# Patient Record
Sex: MALE | Race: WHITE | NOT HISPANIC OR LATINO | Employment: OTHER | URBAN - METROPOLITAN AREA
[De-identification: names, ages, dates, MRNs, and addresses within clinical notes are randomized per-mention and may not be internally consistent; named-entity substitution may affect disease eponyms.]

---

## 2017-01-17 ENCOUNTER — COMMUNICATION - HEALTHEAST (OUTPATIENT)
Dept: INTERNAL MEDICINE | Facility: CLINIC | Age: 67
End: 2017-01-17

## 2017-01-25 ENCOUNTER — OFFICE VISIT - HEALTHEAST (OUTPATIENT)
Dept: EDUCATION SERVICES | Facility: CLINIC | Age: 67
End: 2017-01-25

## 2017-01-25 ENCOUNTER — AMBULATORY - HEALTHEAST (OUTPATIENT)
Dept: EDUCATION SERVICES | Facility: CLINIC | Age: 67
End: 2017-01-25

## 2017-01-25 DIAGNOSIS — E11.9 DIABETES MELLITUS, TYPE 2 (H): ICD-10-CM

## 2017-01-25 DIAGNOSIS — E11.9 DIABETES (H): ICD-10-CM

## 2017-01-30 ENCOUNTER — COMMUNICATION - HEALTHEAST (OUTPATIENT)
Dept: NURSING | Facility: CLINIC | Age: 67
End: 2017-01-30

## 2017-01-31 ENCOUNTER — COMMUNICATION - HEALTHEAST (OUTPATIENT)
Dept: NURSING | Facility: CLINIC | Age: 67
End: 2017-01-31

## 2017-01-31 ENCOUNTER — AMBULATORY - HEALTHEAST (OUTPATIENT)
Dept: CARE COORDINATION | Facility: CLINIC | Age: 67
End: 2017-01-31

## 2017-02-03 ENCOUNTER — COMMUNICATION - HEALTHEAST (OUTPATIENT)
Dept: NURSING | Facility: CLINIC | Age: 67
End: 2017-02-03

## 2017-02-03 ENCOUNTER — OFFICE VISIT - HEALTHEAST (OUTPATIENT)
Dept: INTERNAL MEDICINE | Facility: CLINIC | Age: 67
End: 2017-02-03

## 2017-02-03 ENCOUNTER — AMBULATORY - HEALTHEAST (OUTPATIENT)
Dept: LAB | Facility: CLINIC | Age: 67
End: 2017-02-03

## 2017-02-03 ENCOUNTER — COMMUNICATION - HEALTHEAST (OUTPATIENT)
Dept: INTERNAL MEDICINE | Facility: CLINIC | Age: 67
End: 2017-02-03

## 2017-02-03 DIAGNOSIS — E11.9 DIABETES MELLITUS, TYPE 2 (H): ICD-10-CM

## 2017-02-03 DIAGNOSIS — E66.9 OBESITY: ICD-10-CM

## 2017-02-03 DIAGNOSIS — E78.5 HYPERLIPIDEMIA, UNSPECIFIED HYPERLIPIDEMIA TYPE: ICD-10-CM

## 2017-02-03 DIAGNOSIS — I10 ESSENTIAL HYPERTENSION WITH GOAL BLOOD PRESSURE LESS THAN 140/90: ICD-10-CM

## 2017-02-03 LAB
CHOLEST SERPL-MCNC: 122 MG/DL
FASTING STATUS PATIENT QL REPORTED: NO
HBA1C MFR BLD: 5.6 % (ref 3.5–6)
HDLC SERPL-MCNC: 41 MG/DL
LDLC SERPL CALC-MCNC: 56 MG/DL
TRIGL SERPL-MCNC: 125 MG/DL

## 2017-02-03 ASSESSMENT — MIFFLIN-ST. JEOR: SCORE: 1594.28

## 2017-02-07 ENCOUNTER — COMMUNICATION - HEALTHEAST (OUTPATIENT)
Dept: INTERNAL MEDICINE | Facility: CLINIC | Age: 67
End: 2017-02-07

## 2017-03-14 ENCOUNTER — COMMUNICATION - HEALTHEAST (OUTPATIENT)
Dept: NURSING | Facility: CLINIC | Age: 67
End: 2017-03-14

## 2017-03-30 ENCOUNTER — COMMUNICATION - HEALTHEAST (OUTPATIENT)
Dept: INTERNAL MEDICINE | Facility: CLINIC | Age: 67
End: 2017-03-30

## 2017-04-20 ENCOUNTER — COMMUNICATION - HEALTHEAST (OUTPATIENT)
Dept: CARDIOLOGY | Facility: CLINIC | Age: 67
End: 2017-04-20

## 2017-04-20 DIAGNOSIS — I10 ESSENTIAL HYPERTENSION: ICD-10-CM

## 2017-06-07 ENCOUNTER — OFFICE VISIT - HEALTHEAST (OUTPATIENT)
Dept: EDUCATION SERVICES | Facility: CLINIC | Age: 67
End: 2017-06-07

## 2017-06-07 DIAGNOSIS — E11.9 DIABETES MELLITUS, TYPE 2 (H): ICD-10-CM

## 2017-06-07 DIAGNOSIS — E11.9 DIABETES (H): ICD-10-CM

## 2017-06-08 ENCOUNTER — COMMUNICATION - HEALTHEAST (OUTPATIENT)
Dept: NURSING | Facility: CLINIC | Age: 67
End: 2017-06-08

## 2017-06-13 ENCOUNTER — RECORDS - HEALTHEAST (OUTPATIENT)
Dept: ADMINISTRATIVE | Facility: OTHER | Age: 67
End: 2017-06-13

## 2017-06-19 ENCOUNTER — COMMUNICATION - HEALTHEAST (OUTPATIENT)
Dept: NURSING | Facility: CLINIC | Age: 67
End: 2017-06-19

## 2017-06-22 ENCOUNTER — COMMUNICATION - HEALTHEAST (OUTPATIENT)
Dept: INTERNAL MEDICINE | Facility: CLINIC | Age: 67
End: 2017-06-22

## 2017-07-10 ENCOUNTER — COMMUNICATION - HEALTHEAST (OUTPATIENT)
Dept: NURSING | Facility: CLINIC | Age: 67
End: 2017-07-10

## 2017-07-14 ENCOUNTER — AMBULATORY - HEALTHEAST (OUTPATIENT)
Dept: CARE COORDINATION | Facility: CLINIC | Age: 67
End: 2017-07-14

## 2017-08-15 ENCOUNTER — OFFICE VISIT - HEALTHEAST (OUTPATIENT)
Dept: INTERNAL MEDICINE | Facility: CLINIC | Age: 67
End: 2017-08-15

## 2017-08-15 DIAGNOSIS — Z12.11 SCREEN FOR COLON CANCER: ICD-10-CM

## 2017-08-15 DIAGNOSIS — R41.89 COGNITIVE IMPAIRMENT: ICD-10-CM

## 2017-08-15 DIAGNOSIS — I10 ESSENTIAL HYPERTENSION WITH GOAL BLOOD PRESSURE LESS THAN 140/90: ICD-10-CM

## 2017-08-15 DIAGNOSIS — E66.9 OBESITY: ICD-10-CM

## 2017-08-15 DIAGNOSIS — Z23 IMMUNIZATION DUE: ICD-10-CM

## 2017-08-15 DIAGNOSIS — E78.5 HYPERLIPIDEMIA, UNSPECIFIED HYPERLIPIDEMIA TYPE: ICD-10-CM

## 2017-08-15 DIAGNOSIS — R26.89 POOR BALANCE: ICD-10-CM

## 2017-08-15 DIAGNOSIS — E11.9 DIABETES MELLITUS, TYPE 2 (H): ICD-10-CM

## 2017-08-15 DIAGNOSIS — N18.30 CHRONIC KIDNEY DISEASE, STAGE III (MODERATE) (H): ICD-10-CM

## 2017-08-15 LAB
CHOLEST SERPL-MCNC: 145 MG/DL
FASTING STATUS PATIENT QL REPORTED: NO
HBA1C MFR BLD: 5.4 % (ref 3.5–6)
HDLC SERPL-MCNC: 45 MG/DL
LDLC SERPL CALC-MCNC: 68 MG/DL
TRIGL SERPL-MCNC: 162 MG/DL

## 2017-08-15 ASSESSMENT — MIFFLIN-ST. JEOR: SCORE: 1571.6

## 2017-08-17 ENCOUNTER — COMMUNICATION - HEALTHEAST (OUTPATIENT)
Dept: INTERNAL MEDICINE | Facility: CLINIC | Age: 67
End: 2017-08-17

## 2017-08-23 ENCOUNTER — COMMUNICATION - HEALTHEAST (OUTPATIENT)
Dept: NURSING | Facility: CLINIC | Age: 67
End: 2017-08-23

## 2017-08-31 ENCOUNTER — COMMUNICATION - HEALTHEAST (OUTPATIENT)
Dept: NURSING | Facility: CLINIC | Age: 67
End: 2017-08-31

## 2017-09-08 ENCOUNTER — COMMUNICATION - HEALTHEAST (OUTPATIENT)
Dept: NURSING | Facility: CLINIC | Age: 67
End: 2017-09-08

## 2017-09-13 ENCOUNTER — COMMUNICATION - HEALTHEAST (OUTPATIENT)
Dept: INTERNAL MEDICINE | Facility: CLINIC | Age: 67
End: 2017-09-13

## 2017-10-09 ENCOUNTER — COMMUNICATION - HEALTHEAST (OUTPATIENT)
Dept: NURSING | Facility: CLINIC | Age: 67
End: 2017-10-09

## 2017-10-09 ENCOUNTER — COMMUNICATION - HEALTHEAST (OUTPATIENT)
Dept: INTERNAL MEDICINE | Facility: CLINIC | Age: 67
End: 2017-10-09

## 2017-10-09 DIAGNOSIS — I10 HYPERTENSION: ICD-10-CM

## 2017-11-08 ENCOUNTER — OFFICE VISIT - HEALTHEAST (OUTPATIENT)
Dept: EDUCATION SERVICES | Facility: CLINIC | Age: 67
End: 2017-11-08

## 2017-11-08 DIAGNOSIS — E11.9 DIABETES MELLITUS, TYPE 2 (H): ICD-10-CM

## 2017-11-09 ENCOUNTER — COMMUNICATION - HEALTHEAST (OUTPATIENT)
Dept: NURSING | Facility: CLINIC | Age: 67
End: 2017-11-09

## 2017-11-28 ENCOUNTER — RECORDS - HEALTHEAST (OUTPATIENT)
Dept: ADMINISTRATIVE | Facility: OTHER | Age: 67
End: 2017-11-28

## 2017-12-14 ENCOUNTER — COMMUNICATION - HEALTHEAST (OUTPATIENT)
Dept: INTERNAL MEDICINE | Facility: CLINIC | Age: 67
End: 2017-12-14

## 2017-12-14 DIAGNOSIS — E11.9 DIABETES MELLITUS, TYPE 2 (H): ICD-10-CM

## 2017-12-14 DIAGNOSIS — I10 ESSENTIAL HYPERTENSION WITH GOAL BLOOD PRESSURE LESS THAN 140/90: ICD-10-CM

## 2017-12-14 DIAGNOSIS — E66.9 OBESITY: ICD-10-CM

## 2017-12-14 DIAGNOSIS — E78.5 HYPERLIPIDEMIA, UNSPECIFIED HYPERLIPIDEMIA TYPE: ICD-10-CM

## 2017-12-22 ENCOUNTER — COMMUNICATION - HEALTHEAST (OUTPATIENT)
Dept: INTERNAL MEDICINE | Facility: CLINIC | Age: 67
End: 2017-12-22

## 2017-12-22 ENCOUNTER — COMMUNICATION - HEALTHEAST (OUTPATIENT)
Dept: NURSING | Facility: CLINIC | Age: 67
End: 2017-12-22

## 2017-12-22 DIAGNOSIS — E78.5 HYPERLIPIDEMIA, UNSPECIFIED HYPERLIPIDEMIA TYPE: ICD-10-CM

## 2017-12-22 DIAGNOSIS — E66.9 OBESITY: ICD-10-CM

## 2017-12-22 DIAGNOSIS — I10 ESSENTIAL HYPERTENSION WITH GOAL BLOOD PRESSURE LESS THAN 140/90: ICD-10-CM

## 2017-12-22 DIAGNOSIS — E11.9 DIABETES MELLITUS, TYPE 2 (H): ICD-10-CM

## 2018-02-22 ENCOUNTER — COMMUNICATION - HEALTHEAST (OUTPATIENT)
Dept: FAMILY MEDICINE | Facility: CLINIC | Age: 68
End: 2018-02-22

## 2018-03-17 ENCOUNTER — COMMUNICATION - HEALTHEAST (OUTPATIENT)
Dept: INTERNAL MEDICINE | Facility: CLINIC | Age: 68
End: 2018-03-17

## 2018-03-17 DIAGNOSIS — E11.9 DIABETES MELLITUS, TYPE 2 (H): ICD-10-CM

## 2018-04-05 ENCOUNTER — COMMUNICATION - HEALTHEAST (OUTPATIENT)
Dept: NURSING | Facility: CLINIC | Age: 68
End: 2018-04-05

## 2018-04-25 ENCOUNTER — COMMUNICATION - HEALTHEAST (OUTPATIENT)
Dept: INTERNAL MEDICINE | Facility: CLINIC | Age: 68
End: 2018-04-25

## 2018-04-25 ENCOUNTER — OFFICE VISIT - HEALTHEAST (OUTPATIENT)
Dept: INTERNAL MEDICINE | Facility: CLINIC | Age: 68
End: 2018-04-25

## 2018-04-25 DIAGNOSIS — I10 ESSENTIAL HYPERTENSION: ICD-10-CM

## 2018-04-25 DIAGNOSIS — E11.9 DIABETES MELLITUS, TYPE 2 (H): ICD-10-CM

## 2018-04-25 DIAGNOSIS — E66.9 OBESITY: ICD-10-CM

## 2018-04-25 DIAGNOSIS — R41.89 COGNITIVE IMPAIRMENT: ICD-10-CM

## 2018-04-25 DIAGNOSIS — E78.5 HYPERLIPIDEMIA, UNSPECIFIED HYPERLIPIDEMIA TYPE: ICD-10-CM

## 2018-04-25 LAB
ANION GAP SERPL CALCULATED.3IONS-SCNC: 12 MMOL/L (ref 5–18)
BUN SERPL-MCNC: 21 MG/DL (ref 8–22)
CALCIUM SERPL-MCNC: 9.7 MG/DL (ref 8.5–10.5)
CHLORIDE BLD-SCNC: 102 MMOL/L (ref 98–107)
CHOLEST SERPL-MCNC: 119 MG/DL
CO2 SERPL-SCNC: 25 MMOL/L (ref 22–31)
CREAT SERPL-MCNC: 1.04 MG/DL (ref 0.7–1.3)
FASTING STATUS PATIENT QL REPORTED: YES
GFR SERPL CREATININE-BSD FRML MDRD: >60 ML/MIN/1.73M2
GLUCOSE BLD-MCNC: 100 MG/DL (ref 70–125)
HBA1C MFR BLD: 5.7 % (ref 3.5–6)
HDLC SERPL-MCNC: 40 MG/DL
LDLC SERPL CALC-MCNC: 59 MG/DL
POTASSIUM BLD-SCNC: 3.7 MMOL/L (ref 3.5–5)
SODIUM SERPL-SCNC: 139 MMOL/L (ref 136–145)
TRIGL SERPL-MCNC: 98 MG/DL

## 2018-04-25 ASSESSMENT — MIFFLIN-ST. JEOR: SCORE: 1585.2

## 2018-05-01 ENCOUNTER — COMMUNICATION - HEALTHEAST (OUTPATIENT)
Dept: NURSING | Facility: CLINIC | Age: 68
End: 2018-05-01

## 2018-05-10 ENCOUNTER — COMMUNICATION - HEALTHEAST (OUTPATIENT)
Dept: NURSING | Facility: CLINIC | Age: 68
End: 2018-05-10

## 2018-05-14 ENCOUNTER — AMBULATORY - HEALTHEAST (OUTPATIENT)
Dept: CARE COORDINATION | Facility: CLINIC | Age: 68
End: 2018-05-14

## 2018-05-22 ENCOUNTER — RECORDS - HEALTHEAST (OUTPATIENT)
Dept: ADMINISTRATIVE | Facility: OTHER | Age: 68
End: 2018-05-22

## 2018-06-22 ENCOUNTER — COMMUNICATION - HEALTHEAST (OUTPATIENT)
Dept: NURSING | Facility: CLINIC | Age: 68
End: 2018-06-22

## 2018-09-06 ENCOUNTER — COMMUNICATION - HEALTHEAST (OUTPATIENT)
Dept: INTERNAL MEDICINE | Facility: CLINIC | Age: 68
End: 2018-09-06

## 2018-09-06 DIAGNOSIS — I10 HYPERTENSION: ICD-10-CM

## 2018-12-05 ENCOUNTER — COMMUNICATION - HEALTHEAST (OUTPATIENT)
Dept: NURSING | Facility: CLINIC | Age: 68
End: 2018-12-05

## 2018-12-05 DIAGNOSIS — E66.9 OBESITY: ICD-10-CM

## 2018-12-05 DIAGNOSIS — E78.5 HYPERLIPIDEMIA, UNSPECIFIED HYPERLIPIDEMIA TYPE: ICD-10-CM

## 2018-12-05 DIAGNOSIS — I10 ESSENTIAL HYPERTENSION WITH GOAL BLOOD PRESSURE LESS THAN 140/90: ICD-10-CM

## 2018-12-05 DIAGNOSIS — I10 HYPERTENSION: ICD-10-CM

## 2018-12-05 DIAGNOSIS — E11.9 DIABETES MELLITUS, TYPE 2 (H): ICD-10-CM

## 2019-01-23 ENCOUNTER — COMMUNICATION - HEALTHEAST (OUTPATIENT)
Dept: NURSING | Facility: CLINIC | Age: 69
End: 2019-01-23

## 2019-01-23 DIAGNOSIS — E66.9 OBESITY: ICD-10-CM

## 2019-01-23 DIAGNOSIS — E11.9 DIABETES MELLITUS, TYPE 2 (H): ICD-10-CM

## 2019-01-23 DIAGNOSIS — E78.5 HYPERLIPIDEMIA, UNSPECIFIED HYPERLIPIDEMIA TYPE: ICD-10-CM

## 2019-01-23 DIAGNOSIS — I10 ESSENTIAL HYPERTENSION WITH GOAL BLOOD PRESSURE LESS THAN 140/90: ICD-10-CM

## 2019-02-26 ENCOUNTER — COMMUNICATION - HEALTHEAST (OUTPATIENT)
Dept: NURSING | Facility: CLINIC | Age: 69
End: 2019-02-26

## 2019-02-26 DIAGNOSIS — I10 HYPERTENSION: ICD-10-CM

## 2019-02-26 DIAGNOSIS — I10 ESSENTIAL HYPERTENSION WITH GOAL BLOOD PRESSURE LESS THAN 140/90: ICD-10-CM

## 2019-02-26 DIAGNOSIS — E78.5 HYPERLIPIDEMIA, UNSPECIFIED HYPERLIPIDEMIA TYPE: ICD-10-CM

## 2019-02-26 DIAGNOSIS — E11.9 DIABETES MELLITUS, TYPE 2 (H): ICD-10-CM

## 2019-02-26 DIAGNOSIS — E66.9 OBESITY: ICD-10-CM

## 2019-03-27 ENCOUNTER — COMMUNICATION - HEALTHEAST (OUTPATIENT)
Dept: INTERNAL MEDICINE | Facility: CLINIC | Age: 69
End: 2019-03-27

## 2019-03-27 DIAGNOSIS — E11.9 DIABETES MELLITUS, TYPE 2 (H): ICD-10-CM

## 2019-03-27 DIAGNOSIS — E11.9 DIABETES (H): ICD-10-CM

## 2019-03-27 RX ORDER — GLUCOSAMINE HCL/CHONDROITIN SU 500-400 MG
CAPSULE ORAL
Qty: 30 STRIP | Refills: 11 | Status: SHIPPED | OUTPATIENT
Start: 2019-03-27 | End: 2023-08-11

## 2019-04-24 ENCOUNTER — COMMUNICATION - HEALTHEAST (OUTPATIENT)
Dept: NURSING | Facility: CLINIC | Age: 69
End: 2019-04-24

## 2019-04-24 DIAGNOSIS — I10 HYPERTENSION: ICD-10-CM

## 2019-04-24 DIAGNOSIS — E11.9 DIABETES MELLITUS, TYPE 2 (H): ICD-10-CM

## 2019-04-24 DIAGNOSIS — E78.5 HYPERLIPIDEMIA, UNSPECIFIED HYPERLIPIDEMIA TYPE: ICD-10-CM

## 2019-04-24 DIAGNOSIS — I10 ESSENTIAL HYPERTENSION WITH GOAL BLOOD PRESSURE LESS THAN 140/90: ICD-10-CM

## 2019-04-24 DIAGNOSIS — E66.9 OBESITY: ICD-10-CM

## 2019-05-01 ENCOUNTER — RECORDS - HEALTHEAST (OUTPATIENT)
Dept: ADMINISTRATIVE | Facility: OTHER | Age: 69
End: 2019-05-01

## 2019-05-28 ENCOUNTER — RECORDS - HEALTHEAST (OUTPATIENT)
Dept: ADMINISTRATIVE | Facility: OTHER | Age: 69
End: 2019-05-28

## 2019-06-04 ENCOUNTER — RECORDS - HEALTHEAST (OUTPATIENT)
Dept: HEALTH INFORMATION MANAGEMENT | Facility: CLINIC | Age: 69
End: 2019-06-04

## 2019-06-11 ENCOUNTER — RECORDS - HEALTHEAST (OUTPATIENT)
Dept: ADMINISTRATIVE | Facility: OTHER | Age: 69
End: 2019-06-11

## 2019-10-02 ENCOUNTER — COMMUNICATION - HEALTHEAST (OUTPATIENT)
Dept: INTERNAL MEDICINE | Facility: CLINIC | Age: 69
End: 2019-10-02

## 2019-10-02 ENCOUNTER — OFFICE VISIT - HEALTHEAST (OUTPATIENT)
Dept: INTERNAL MEDICINE | Facility: CLINIC | Age: 69
End: 2019-10-02

## 2019-10-02 DIAGNOSIS — Z12.5 SCREENING FOR PROSTATE CANCER: ICD-10-CM

## 2019-10-02 DIAGNOSIS — E78.5 HYPERLIPIDEMIA, UNSPECIFIED HYPERLIPIDEMIA TYPE: ICD-10-CM

## 2019-10-02 DIAGNOSIS — Z23 NEED FOR PROPHYLACTIC VACCINATION AND INOCULATION AGAINST INFLUENZA: ICD-10-CM

## 2019-10-02 DIAGNOSIS — N18.30 CHRONIC KIDNEY DISEASE, STAGE III (MODERATE) (H): ICD-10-CM

## 2019-10-02 DIAGNOSIS — Z00.00 ROUTINE GENERAL MEDICAL EXAMINATION AT A HEALTH CARE FACILITY: ICD-10-CM

## 2019-10-02 DIAGNOSIS — Z71.89 ADVANCED CARE PLANNING/COUNSELING DISCUSSION: ICD-10-CM

## 2019-10-02 DIAGNOSIS — E11.9 TYPE 2 DIABETES MELLITUS WITHOUT COMPLICATION, WITHOUT LONG-TERM CURRENT USE OF INSULIN (H): ICD-10-CM

## 2019-10-02 DIAGNOSIS — I10 ESSENTIAL HYPERTENSION: ICD-10-CM

## 2019-10-02 LAB
ALBUMIN SERPL-MCNC: 4.3 G/DL (ref 3.5–5)
ALP SERPL-CCNC: 40 U/L (ref 45–120)
ALT SERPL W P-5'-P-CCNC: 19 U/L (ref 0–45)
ANION GAP SERPL CALCULATED.3IONS-SCNC: 11 MMOL/L (ref 5–18)
AST SERPL W P-5'-P-CCNC: 17 U/L (ref 0–40)
BILIRUB SERPL-MCNC: 0.4 MG/DL (ref 0–1)
BUN SERPL-MCNC: 20 MG/DL (ref 8–28)
CALCIUM SERPL-MCNC: 10.3 MG/DL (ref 8.5–10.5)
CHLORIDE BLD-SCNC: 102 MMOL/L (ref 98–107)
CHOLEST SERPL-MCNC: 146 MG/DL
CO2 SERPL-SCNC: 29 MMOL/L (ref 22–31)
CREAT SERPL-MCNC: 1.01 MG/DL (ref 0.7–1.3)
ERYTHROCYTE [DISTWIDTH] IN BLOOD BY AUTOMATED COUNT: 12.5 % (ref 11–14.5)
FASTING STATUS PATIENT QL REPORTED: YES
GFR SERPL CREATININE-BSD FRML MDRD: >60 ML/MIN/1.73M2
GLUCOSE BLD-MCNC: 116 MG/DL (ref 70–125)
HBA1C MFR BLD: 5.8 % (ref 3.5–6)
HCT VFR BLD AUTO: 46 % (ref 40–54)
HDLC SERPL-MCNC: 47 MG/DL
HGB BLD-MCNC: 15.6 G/DL (ref 14–18)
LDLC SERPL CALC-MCNC: 61 MG/DL
MCH RBC QN AUTO: 30.7 PG (ref 27–34)
MCHC RBC AUTO-ENTMCNC: 33.8 G/DL (ref 32–36)
MCV RBC AUTO: 91 FL (ref 80–100)
PLATELET # BLD AUTO: 213 THOU/UL (ref 140–440)
PMV BLD AUTO: 8.8 FL (ref 7–10)
POTASSIUM BLD-SCNC: 4.3 MMOL/L (ref 3.5–5)
PROT SERPL-MCNC: 7.3 G/DL (ref 6–8)
PSA SERPL-MCNC: 3.7 NG/ML (ref 0–6.5)
RBC # BLD AUTO: 5.07 MILL/UL (ref 4.4–6.2)
SODIUM SERPL-SCNC: 142 MMOL/L (ref 136–145)
TRIGL SERPL-MCNC: 192 MG/DL
WBC: 6.1 THOU/UL (ref 4–11)

## 2019-10-02 ASSESSMENT — MIFFLIN-ST. JEOR: SCORE: 1621.49

## 2020-04-16 ENCOUNTER — COMMUNICATION - HEALTHEAST (OUTPATIENT)
Dept: NURSING | Facility: CLINIC | Age: 70
End: 2020-04-16

## 2020-04-16 DIAGNOSIS — E66.9 OBESITY: ICD-10-CM

## 2020-04-16 DIAGNOSIS — E11.9 DIABETES MELLITUS, TYPE 2 (H): ICD-10-CM

## 2020-04-16 DIAGNOSIS — E78.5 HYPERLIPIDEMIA, UNSPECIFIED HYPERLIPIDEMIA TYPE: ICD-10-CM

## 2020-04-16 DIAGNOSIS — I10 ESSENTIAL HYPERTENSION WITH GOAL BLOOD PRESSURE LESS THAN 140/90: ICD-10-CM

## 2020-05-15 ENCOUNTER — COMMUNICATION - HEALTHEAST (OUTPATIENT)
Dept: NURSING | Facility: CLINIC | Age: 70
End: 2020-05-15

## 2020-05-15 DIAGNOSIS — I10 HYPERTENSION: ICD-10-CM

## 2020-05-15 DIAGNOSIS — I10 ESSENTIAL HYPERTENSION WITH GOAL BLOOD PRESSURE LESS THAN 140/90: ICD-10-CM

## 2020-05-15 DIAGNOSIS — E11.9 DIABETES MELLITUS, TYPE 2 (H): ICD-10-CM

## 2020-06-02 ENCOUNTER — RECORDS - HEALTHEAST (OUTPATIENT)
Dept: ADMINISTRATIVE | Facility: OTHER | Age: 70
End: 2020-06-02

## 2020-06-02 LAB — RETINOPATHY: NEGATIVE

## 2020-06-15 ENCOUNTER — RECORDS - HEALTHEAST (OUTPATIENT)
Dept: HEALTH INFORMATION MANAGEMENT | Facility: CLINIC | Age: 70
End: 2020-06-15

## 2020-06-24 ENCOUNTER — COMMUNICATION - HEALTHEAST (OUTPATIENT)
Dept: NURSING | Facility: CLINIC | Age: 70
End: 2020-06-24

## 2020-06-24 DIAGNOSIS — I10 ESSENTIAL HYPERTENSION WITH GOAL BLOOD PRESSURE LESS THAN 140/90: ICD-10-CM

## 2020-06-24 DIAGNOSIS — E66.9 OBESITY: ICD-10-CM

## 2020-06-24 DIAGNOSIS — E11.9 DIABETES MELLITUS, TYPE 2 (H): ICD-10-CM

## 2020-06-24 DIAGNOSIS — E78.5 HYPERLIPIDEMIA, UNSPECIFIED HYPERLIPIDEMIA TYPE: ICD-10-CM

## 2020-10-07 ENCOUNTER — COMMUNICATION - HEALTHEAST (OUTPATIENT)
Dept: INTERNAL MEDICINE | Facility: CLINIC | Age: 70
End: 2020-10-07

## 2020-10-07 DIAGNOSIS — E78.5 HYPERLIPIDEMIA, UNSPECIFIED HYPERLIPIDEMIA TYPE: ICD-10-CM

## 2020-10-07 DIAGNOSIS — I10 ESSENTIAL HYPERTENSION WITH GOAL BLOOD PRESSURE LESS THAN 140/90: ICD-10-CM

## 2020-10-07 DIAGNOSIS — E11.9 DIABETES MELLITUS, TYPE 2 (H): ICD-10-CM

## 2020-10-07 DIAGNOSIS — E66.9 OBESITY: ICD-10-CM

## 2020-10-07 RX ORDER — SIMVASTATIN 20 MG
20 TABLET ORAL DAILY
Qty: 90 TABLET | Refills: 3 | Status: SHIPPED | OUTPATIENT
Start: 2020-10-07 | End: 2021-10-18

## 2020-11-16 ENCOUNTER — COMMUNICATION - HEALTHEAST (OUTPATIENT)
Dept: INTERNAL MEDICINE | Facility: CLINIC | Age: 70
End: 2020-11-16

## 2020-11-16 DIAGNOSIS — E78.5 HYPERLIPIDEMIA, UNSPECIFIED HYPERLIPIDEMIA TYPE: ICD-10-CM

## 2020-11-16 DIAGNOSIS — I10 ESSENTIAL HYPERTENSION WITH GOAL BLOOD PRESSURE LESS THAN 140/90: ICD-10-CM

## 2020-11-16 DIAGNOSIS — E11.9 DIABETES MELLITUS, TYPE 2 (H): ICD-10-CM

## 2020-11-16 DIAGNOSIS — I10 HYPERTENSION: ICD-10-CM

## 2020-11-16 DIAGNOSIS — E66.9 OBESITY: ICD-10-CM

## 2021-03-04 ENCOUNTER — AMBULATORY - HEALTHEAST (OUTPATIENT)
Dept: NURSING | Facility: CLINIC | Age: 71
End: 2021-03-04

## 2021-03-25 ENCOUNTER — AMBULATORY - HEALTHEAST (OUTPATIENT)
Dept: NURSING | Facility: CLINIC | Age: 71
End: 2021-03-25

## 2021-05-28 NOTE — TELEPHONE ENCOUNTER
RN cannot approve Refill Request    RN can NOT refill this medication PCP messaged that patient is overdue for Labs and Office Visit.       Angelia Carnes, [facility] Triage/Med Refill 4/26/2019    Requested Prescriptions   Pending Prescriptions Disp Refills     simvastatin (ZOCOR) 20 MG tablet [Pharmacy Med Name: SIMVASTATIN 20MG TABLETS] 90 tablet 0     Sig: TAKE 1 TABLET(20 MG) BY MOUTH DAILY       Statins Refill Protocol (Hmg CoA Reductase Inhibitors) Passed - 4/24/2019 12:22 PM        Passed - PCP or prescribing provider visit in past 12 months      Last office visit with prescriber/PCP: 4/25/2018 Cody Pacheco MD OR same dept: Visit date not found OR same specialty: Visit date not found  Last physical: 2/5/2016 Last MTM visit: Visit date not found   Next visit within 3 mo: Visit date not found  Next physical within 3 mo: Visit date not found  Prescriber OR PCP: Cody Pacheco MD  Last diagnosis associated with med order: 1. Diabetes mellitus, type 2 (H)  - simvastatin (ZOCOR) 20 MG tablet [Pharmacy Med Name: SIMVASTATIN 20MG TABLETS]; TAKE 1 TABLET(20 MG) BY MOUTH DAILY  Dispense: 90 tablet; Refill: 0  - lisinopril (PRINIVIL,ZESTRIL) 20 MG tablet [Pharmacy Med Name: LISINOPRIL 20MG TABLETS]; TAKE 1 TABLET(20 MG) BY MOUTH DAILY  Dispense: 90 tablet; Refill: 0  - metFORMIN (GLUCOPHAGE) 1000 MG tablet [Pharmacy Med Name: METFORMIN 1000MG TABLETS]; TAKE 1 TABLET BY MOUTH TWICE DAILY WITH MEALS.  Dispense: 180 tablet; Refill: 0    2. Essential hypertension with goal blood pressure less than 140/90  - simvastatin (ZOCOR) 20 MG tablet [Pharmacy Med Name: SIMVASTATIN 20MG TABLETS]; TAKE 1 TABLET(20 MG) BY MOUTH DAILY  Dispense: 90 tablet; Refill: 0  - amLODIPine (NORVASC) 10 MG tablet [Pharmacy Med Name: AMLODIPINE BESYLATE 10MG TABLETS]; TAKE 1 TABLET(10 MG) BY MOUTH DAILY  Dispense: 90 tablet; Refill: 0  - lisinopril (PRINIVIL,ZESTRIL) 20 MG tablet [Pharmacy Med Name: LISINOPRIL 20MG TABLETS]; TAKE  1 TABLET(20 MG) BY MOUTH DAILY  Dispense: 90 tablet; Refill: 0    3. Hyperlipidemia, unspecified hyperlipidemia type  - simvastatin (ZOCOR) 20 MG tablet [Pharmacy Med Name: SIMVASTATIN 20MG TABLETS]; TAKE 1 TABLET(20 MG) BY MOUTH DAILY  Dispense: 90 tablet; Refill: 0  - lisinopril (PRINIVIL,ZESTRIL) 20 MG tablet [Pharmacy Med Name: LISINOPRIL 20MG TABLETS]; TAKE 1 TABLET(20 MG) BY MOUTH DAILY  Dispense: 90 tablet; Refill: 0    4. Obesity  - simvastatin (ZOCOR) 20 MG tablet [Pharmacy Med Name: SIMVASTATIN 20MG TABLETS]; TAKE 1 TABLET(20 MG) BY MOUTH DAILY  Dispense: 90 tablet; Refill: 0  - lisinopril (PRINIVIL,ZESTRIL) 20 MG tablet [Pharmacy Med Name: LISINOPRIL 20MG TABLETS]; TAKE 1 TABLET(20 MG) BY MOUTH DAILY  Dispense: 90 tablet; Refill: 0    5. Hypertension  - hydroCHLOROthiazide (HYDRODIURIL) 25 MG tablet [Pharmacy Med Name: HYDROCHLOROTHIAZIDE 25MG TABLETS]; TAKE 1 TABLET(25 MG) BY MOUTH DAILY  Dispense: 90 tablet; Refill: 0    If protocol passes may refill for 12 months if within 3 months of last provider visit (or a total of 15 months).             amLODIPine (NORVASC) 10 MG tablet [Pharmacy Med Name: AMLODIPINE BESYLATE 10MG TABLETS] 90 tablet 0     Sig: TAKE 1 TABLET(10 MG) BY MOUTH DAILY       Calcium-Channel Blockers Protocol Passed - 4/24/2019 12:22 PM        Passed - PCP or prescribing provider visit in past 12 months or next 3 months     Last office visit with prescriber/PCP: 4/25/2018 Cody Pacheco MD OR same dept: Visit date not found OR same specialty: Visit date not found  Last physical: 2/5/2016 Last MTM visit: Visit date not found   Next visit within 3 mo: Visit date not found  Next physical within 3 mo: Visit date not found  Prescriber OR PCP: Cody Pacheco MD  Last diagnosis associated with med order: 1. Diabetes mellitus, type 2 (H)  - simvastatin (ZOCOR) 20 MG tablet [Pharmacy Med Name: SIMVASTATIN 20MG TABLETS]; TAKE 1 TABLET(20 MG) BY MOUTH DAILY  Dispense: 90 tablet;  Refill: 0  - lisinopril (PRINIVIL,ZESTRIL) 20 MG tablet [Pharmacy Med Name: LISINOPRIL 20MG TABLETS]; TAKE 1 TABLET(20 MG) BY MOUTH DAILY  Dispense: 90 tablet; Refill: 0  - metFORMIN (GLUCOPHAGE) 1000 MG tablet [Pharmacy Med Name: METFORMIN 1000MG TABLETS]; TAKE 1 TABLET BY MOUTH TWICE DAILY WITH MEALS.  Dispense: 180 tablet; Refill: 0    2. Essential hypertension with goal blood pressure less than 140/90  - simvastatin (ZOCOR) 20 MG tablet [Pharmacy Med Name: SIMVASTATIN 20MG TABLETS]; TAKE 1 TABLET(20 MG) BY MOUTH DAILY  Dispense: 90 tablet; Refill: 0  - amLODIPine (NORVASC) 10 MG tablet [Pharmacy Med Name: AMLODIPINE BESYLATE 10MG TABLETS]; TAKE 1 TABLET(10 MG) BY MOUTH DAILY  Dispense: 90 tablet; Refill: 0  - lisinopril (PRINIVIL,ZESTRIL) 20 MG tablet [Pharmacy Med Name: LISINOPRIL 20MG TABLETS]; TAKE 1 TABLET(20 MG) BY MOUTH DAILY  Dispense: 90 tablet; Refill: 0    3. Hyperlipidemia, unspecified hyperlipidemia type  - simvastatin (ZOCOR) 20 MG tablet [Pharmacy Med Name: SIMVASTATIN 20MG TABLETS]; TAKE 1 TABLET(20 MG) BY MOUTH DAILY  Dispense: 90 tablet; Refill: 0  - lisinopril (PRINIVIL,ZESTRIL) 20 MG tablet [Pharmacy Med Name: LISINOPRIL 20MG TABLETS]; TAKE 1 TABLET(20 MG) BY MOUTH DAILY  Dispense: 90 tablet; Refill: 0    4. Obesity  - simvastatin (ZOCOR) 20 MG tablet [Pharmacy Med Name: SIMVASTATIN 20MG TABLETS]; TAKE 1 TABLET(20 MG) BY MOUTH DAILY  Dispense: 90 tablet; Refill: 0  - lisinopril (PRINIVIL,ZESTRIL) 20 MG tablet [Pharmacy Med Name: LISINOPRIL 20MG TABLETS]; TAKE 1 TABLET(20 MG) BY MOUTH DAILY  Dispense: 90 tablet; Refill: 0    5. Hypertension  - hydroCHLOROthiazide (HYDRODIURIL) 25 MG tablet [Pharmacy Med Name: HYDROCHLOROTHIAZIDE 25MG TABLETS]; TAKE 1 TABLET(25 MG) BY MOUTH DAILY  Dispense: 90 tablet; Refill: 0    If protocol passes may refill for 12 months if within 3 months of last provider visit (or a total of 15 months).             Passed - Blood pressure filed in past 12 months     BP  Readings from Last 1 Encounters:   04/25/18 116/68             lisinopril (PRINIVIL,ZESTRIL) 20 MG tablet [Pharmacy Med Name: LISINOPRIL 20MG TABLETS] 90 tablet 0     Sig: TAKE 1 TABLET(20 MG) BY MOUTH DAILY       Ace Inhibitors Refill Protocol Passed - 4/24/2019 12:22 PM        Passed - PCP or prescribing provider visit in past 12 months       Last office visit with prescriber/PCP: 4/25/2018 Cody Pacheco MD OR same dept: Visit date not found OR same specialty: Visit date not found  Last physical: 2/5/2016 Last MTM visit: Visit date not found   Next visit within 3 mo: Visit date not found  Next physical within 3 mo: Visit date not found  Prescriber OR PCP: Cody Pacheco MD  Last diagnosis associated with med order: 1. Diabetes mellitus, type 2 (H)  - simvastatin (ZOCOR) 20 MG tablet [Pharmacy Med Name: SIMVASTATIN 20MG TABLETS]; TAKE 1 TABLET(20 MG) BY MOUTH DAILY  Dispense: 90 tablet; Refill: 0  - lisinopril (PRINIVIL,ZESTRIL) 20 MG tablet [Pharmacy Med Name: LISINOPRIL 20MG TABLETS]; TAKE 1 TABLET(20 MG) BY MOUTH DAILY  Dispense: 90 tablet; Refill: 0  - metFORMIN (GLUCOPHAGE) 1000 MG tablet [Pharmacy Med Name: METFORMIN 1000MG TABLETS]; TAKE 1 TABLET BY MOUTH TWICE DAILY WITH MEALS.  Dispense: 180 tablet; Refill: 0    2. Essential hypertension with goal blood pressure less than 140/90  - simvastatin (ZOCOR) 20 MG tablet [Pharmacy Med Name: SIMVASTATIN 20MG TABLETS]; TAKE 1 TABLET(20 MG) BY MOUTH DAILY  Dispense: 90 tablet; Refill: 0  - amLODIPine (NORVASC) 10 MG tablet [Pharmacy Med Name: AMLODIPINE BESYLATE 10MG TABLETS]; TAKE 1 TABLET(10 MG) BY MOUTH DAILY  Dispense: 90 tablet; Refill: 0  - lisinopril (PRINIVIL,ZESTRIL) 20 MG tablet [Pharmacy Med Name: LISINOPRIL 20MG TABLETS]; TAKE 1 TABLET(20 MG) BY MOUTH DAILY  Dispense: 90 tablet; Refill: 0    3. Hyperlipidemia, unspecified hyperlipidemia type  - simvastatin (ZOCOR) 20 MG tablet [Pharmacy Med Name: SIMVASTATIN 20MG TABLETS]; TAKE 1 TABLET(20  MG) BY MOUTH DAILY  Dispense: 90 tablet; Refill: 0  - lisinopril (PRINIVIL,ZESTRIL) 20 MG tablet [Pharmacy Med Name: LISINOPRIL 20MG TABLETS]; TAKE 1 TABLET(20 MG) BY MOUTH DAILY  Dispense: 90 tablet; Refill: 0    4. Obesity  - simvastatin (ZOCOR) 20 MG tablet [Pharmacy Med Name: SIMVASTATIN 20MG TABLETS]; TAKE 1 TABLET(20 MG) BY MOUTH DAILY  Dispense: 90 tablet; Refill: 0  - lisinopril (PRINIVIL,ZESTRIL) 20 MG tablet [Pharmacy Med Name: LISINOPRIL 20MG TABLETS]; TAKE 1 TABLET(20 MG) BY MOUTH DAILY  Dispense: 90 tablet; Refill: 0    5. Hypertension  - hydroCHLOROthiazide (HYDRODIURIL) 25 MG tablet [Pharmacy Med Name: HYDROCHLOROTHIAZIDE 25MG TABLETS]; TAKE 1 TABLET(25 MG) BY MOUTH DAILY  Dispense: 90 tablet; Refill: 0    If protocol passes may refill for 12 months if within 3 months of last provider visit (or a total of 15 months).             Passed - Serum Potassium in past 12 months     No results found for: LN-POTASSIUM          Passed - Blood pressure filed in past 12 months     BP Readings from Last 1 Encounters:   04/25/18 116/68             Passed - Serum Creatinine in past 12 months     Creatinine   Date Value Ref Range Status   04/25/2018 1.04 0.70 - 1.30 mg/dL Final             hydroCHLOROthiazide (HYDRODIURIL) 25 MG tablet [Pharmacy Med Name: HYDROCHLOROTHIAZIDE 25MG TABLETS] 90 tablet 0     Sig: TAKE 1 TABLET(25 MG) BY MOUTH DAILY       Diuretics/Combination Diuretics Refill Protocol  Passed - 4/24/2019 12:22 PM        Passed - Visit with PCP or prescribing provider visit in past 12 months     Last office visit with prescriber/PCP: 4/25/2018 Cody Pacheco MD OR same dept: Visit date not found OR same specialty: Visit date not found  Last physical: 2/5/2016 Last MTM visit: Visit date not found   Next visit within 3 mo: Visit date not found  Next physical within 3 mo: Visit date not found  Prescriber OR PCP: Cody Pacheco MD  Last diagnosis associated with med order: 1. Diabetes mellitus,  type 2 (H)  - simvastatin (ZOCOR) 20 MG tablet [Pharmacy Med Name: SIMVASTATIN 20MG TABLETS]; TAKE 1 TABLET(20 MG) BY MOUTH DAILY  Dispense: 90 tablet; Refill: 0  - lisinopril (PRINIVIL,ZESTRIL) 20 MG tablet [Pharmacy Med Name: LISINOPRIL 20MG TABLETS]; TAKE 1 TABLET(20 MG) BY MOUTH DAILY  Dispense: 90 tablet; Refill: 0  - metFORMIN (GLUCOPHAGE) 1000 MG tablet [Pharmacy Med Name: METFORMIN 1000MG TABLETS]; TAKE 1 TABLET BY MOUTH TWICE DAILY WITH MEALS.  Dispense: 180 tablet; Refill: 0    2. Essential hypertension with goal blood pressure less than 140/90  - simvastatin (ZOCOR) 20 MG tablet [Pharmacy Med Name: SIMVASTATIN 20MG TABLETS]; TAKE 1 TABLET(20 MG) BY MOUTH DAILY  Dispense: 90 tablet; Refill: 0  - amLODIPine (NORVASC) 10 MG tablet [Pharmacy Med Name: AMLODIPINE BESYLATE 10MG TABLETS]; TAKE 1 TABLET(10 MG) BY MOUTH DAILY  Dispense: 90 tablet; Refill: 0  - lisinopril (PRINIVIL,ZESTRIL) 20 MG tablet [Pharmacy Med Name: LISINOPRIL 20MG TABLETS]; TAKE 1 TABLET(20 MG) BY MOUTH DAILY  Dispense: 90 tablet; Refill: 0    3. Hyperlipidemia, unspecified hyperlipidemia type  - simvastatin (ZOCOR) 20 MG tablet [Pharmacy Med Name: SIMVASTATIN 20MG TABLETS]; TAKE 1 TABLET(20 MG) BY MOUTH DAILY  Dispense: 90 tablet; Refill: 0  - lisinopril (PRINIVIL,ZESTRIL) 20 MG tablet [Pharmacy Med Name: LISINOPRIL 20MG TABLETS]; TAKE 1 TABLET(20 MG) BY MOUTH DAILY  Dispense: 90 tablet; Refill: 0    4. Obesity  - simvastatin (ZOCOR) 20 MG tablet [Pharmacy Med Name: SIMVASTATIN 20MG TABLETS]; TAKE 1 TABLET(20 MG) BY MOUTH DAILY  Dispense: 90 tablet; Refill: 0  - lisinopril (PRINIVIL,ZESTRIL) 20 MG tablet [Pharmacy Med Name: LISINOPRIL 20MG TABLETS]; TAKE 1 TABLET(20 MG) BY MOUTH DAILY  Dispense: 90 tablet; Refill: 0    5. Hypertension  - hydroCHLOROthiazide (HYDRODIURIL) 25 MG tablet [Pharmacy Med Name: HYDROCHLOROTHIAZIDE 25MG TABLETS]; TAKE 1 TABLET(25 MG) BY MOUTH DAILY  Dispense: 90 tablet; Refill: 0    If protocol passes may refill  for 12 months if within 3 months of last provider visit (or a total of 15 months).             Passed - Serum Potassium in past 12 months      No results found for: LN-POTASSIUM          Passed - Serum Sodium in past 12 months      No results found for: LN-SODIUM          Passed - Blood pressure on file in past 12 months     BP Readings from Last 1 Encounters:   04/25/18 116/68             Passed - Serum Creatinine in past 12 months      Creatinine   Date Value Ref Range Status   04/25/2018 1.04 0.70 - 1.30 mg/dL Final             metFORMIN (GLUCOPHAGE) 1000 MG tablet [Pharmacy Med Name: METFORMIN 1000MG TABLETS] 180 tablet 0     Sig: TAKE 1 TABLET BY MOUTH TWICE DAILY WITH MEALS.       Metformin Refill Protocol Failed - 4/24/2019 12:22 PM        Failed - LFT or AST or ALT in last 12 months     Albumin   Date Value Ref Range Status   08/15/2017 4.0 3.5 - 5.0 g/dL Final     Bilirubin, Total   Date Value Ref Range Status   08/15/2017 0.4 0.0 - 1.0 mg/dL Final     Alkaline Phosphatase   Date Value Ref Range Status   08/15/2017 31 (L) 45 - 120 U/L Final     AST   Date Value Ref Range Status   08/15/2017 12 0 - 40 U/L Final     ALT   Date Value Ref Range Status   08/15/2017 17 0 - 45 U/L Final     Protein, Total   Date Value Ref Range Status   08/15/2017 7.2 6.0 - 8.0 g/dL Final                Failed - Visit with PCP or prescribing provider visit in last 6 months or next 3 months     Last office visit with prescriber/PCP: Visit date not found OR same dept: Visit date not found OR same specialty: Visit date not found Last physical: Visit date not found Last MTM visit: Visit date not found         Next appt within 3 mo: Visit date not found  Next physical within 3 mo: Visit date not found  Prescriber OR PCP: Cody Pacheco MD  Last diagnosis associated with med order: 1. Diabetes mellitus, type 2 (H)  - simvastatin (ZOCOR) 20 MG tablet [Pharmacy Med Name: SIMVASTATIN 20MG TABLETS]; TAKE 1 TABLET(20 MG) BY MOUTH DAILY   Dispense: 90 tablet; Refill: 0  - lisinopril (PRINIVIL,ZESTRIL) 20 MG tablet [Pharmacy Med Name: LISINOPRIL 20MG TABLETS]; TAKE 1 TABLET(20 MG) BY MOUTH DAILY  Dispense: 90 tablet; Refill: 0  - metFORMIN (GLUCOPHAGE) 1000 MG tablet [Pharmacy Med Name: METFORMIN 1000MG TABLETS]; TAKE 1 TABLET BY MOUTH TWICE DAILY WITH MEALS.  Dispense: 180 tablet; Refill: 0    2. Essential hypertension with goal blood pressure less than 140/90  - simvastatin (ZOCOR) 20 MG tablet [Pharmacy Med Name: SIMVASTATIN 20MG TABLETS]; TAKE 1 TABLET(20 MG) BY MOUTH DAILY  Dispense: 90 tablet; Refill: 0  - amLODIPine (NORVASC) 10 MG tablet [Pharmacy Med Name: AMLODIPINE BESYLATE 10MG TABLETS]; TAKE 1 TABLET(10 MG) BY MOUTH DAILY  Dispense: 90 tablet; Refill: 0  - lisinopril (PRINIVIL,ZESTRIL) 20 MG tablet [Pharmacy Med Name: LISINOPRIL 20MG TABLETS]; TAKE 1 TABLET(20 MG) BY MOUTH DAILY  Dispense: 90 tablet; Refill: 0    3. Hyperlipidemia, unspecified hyperlipidemia type  - simvastatin (ZOCOR) 20 MG tablet [Pharmacy Med Name: SIMVASTATIN 20MG TABLETS]; TAKE 1 TABLET(20 MG) BY MOUTH DAILY  Dispense: 90 tablet; Refill: 0  - lisinopril (PRINIVIL,ZESTRIL) 20 MG tablet [Pharmacy Med Name: LISINOPRIL 20MG TABLETS]; TAKE 1 TABLET(20 MG) BY MOUTH DAILY  Dispense: 90 tablet; Refill: 0    4. Obesity  - simvastatin (ZOCOR) 20 MG tablet [Pharmacy Med Name: SIMVASTATIN 20MG TABLETS]; TAKE 1 TABLET(20 MG) BY MOUTH DAILY  Dispense: 90 tablet; Refill: 0  - lisinopril (PRINIVIL,ZESTRIL) 20 MG tablet [Pharmacy Med Name: LISINOPRIL 20MG TABLETS]; TAKE 1 TABLET(20 MG) BY MOUTH DAILY  Dispense: 90 tablet; Refill: 0    5. Hypertension  - hydroCHLOROthiazide (HYDRODIURIL) 25 MG tablet [Pharmacy Med Name: HYDROCHLOROTHIAZIDE 25MG TABLETS]; TAKE 1 TABLET(25 MG) BY MOUTH DAILY  Dispense: 90 tablet; Refill: 0     If protocol passes may refill for 12 months if within 3 months of last provider visit (or a total of 15 months).           Failed - A1C in last 6 months      Hemoglobin A1c   Date Value Ref Range Status   04/25/2018 5.7 3.5 - 6.0 % Final               Failed - Microalbumin in last year      No results found for: MICROALBUR               Passed - Blood pressure in last 12 months     BP Readings from Last 1 Encounters:   04/25/18 116/68             Passed - GFR or Serum Creatinine in last 6 months     GFR MDRD Non Af Amer   Date Value Ref Range Status   04/25/2018 >60 >60 mL/min/1.73m2 Final     GFR MDRD Af Amer   Date Value Ref Range Status   04/25/2018 >60 >60 mL/min/1.73m2 Final

## 2021-05-30 VITALS — BODY MASS INDEX: 31.64 KG/M2 | WEIGHT: 199 LBS

## 2021-05-30 VITALS — WEIGHT: 190 LBS | BODY MASS INDEX: 30.21 KG/M2

## 2021-05-30 VITALS — HEIGHT: 67 IN | WEIGHT: 197 LBS | BODY MASS INDEX: 30.92 KG/M2

## 2021-05-31 VITALS — WEIGHT: 192 LBS | BODY MASS INDEX: 30.13 KG/M2 | HEIGHT: 67 IN

## 2021-05-31 VITALS — WEIGHT: 193 LBS | BODY MASS INDEX: 30.68 KG/M2

## 2021-06-01 VITALS — BODY MASS INDEX: 30.61 KG/M2 | HEIGHT: 67 IN | WEIGHT: 195 LBS

## 2021-06-01 NOTE — PROGRESS NOTES
Assessment and Plan:   1. Routine general medical examination at a health care facility  This is a 69-year-old man who comes in for annual wellness visit.  Ongoing healthy lifestyle discussed and recommended.  Issues as discussed below    2. Essential hypertension  Blood pressure is well controlled continue current medications    5. Hyperlipidemia, unspecified hyperlipidemia type  Continue statin    6. Type 2 diabetes mellitus without complication, without long-term current use of insulin (H)  Has been well controlled on metformin.  He is also on aspirin, simvastatin, ACE inhibitor, annual diabetic eye exam and excellent diabetic foot care  - HM2(CBC w/o Differential)  - Lipid Cascade  - Glycosylated Hemoglobin A1c  - Comprehensive Metabolic Panel    7. Chronic kidney disease, stage III (moderate) (H)  Mildly fluctuating creatinine, on lisinopril for renal protection    8. Screening for prostate cancer  - PSA (Prostatic-Specific Antigen), Annual Screen    9. Need for prophylactic vaccination and inoculation against influenza  - Influenza High Dose, Seasonal 65+ yrs    10. Advanced care planning/counseling discussion  We had a 19-minute discussion today about advance healthcare directives.  His sister-in-law and niece would be his medical decision-maker.  His niece is in residency in internal medicine and will be doing a fellowship in infectious diseases.  He remains full code.  He will be okay with short-term intubation and intervention for reversible medical conditions.  He would not want ongoing treatment if there is no meaningful chance for recovery.  He would want to be kept comfortable.  Paperwork given for honoring choices and they will bring this back at their convenience.    The patient's current medical problems were reviewed.    I have had an Advance Directives discussion with the patient.  The following high BMI interventions were performed this visit: encouragement to exercise and lifestyle education  regarding diet  The following health maintenance schedule was reviewed with the patient and provided in printed form in the after visit summary:   Health Maintenance   Topic Date Due     HEPATITIS C SCREENING  1950     DIABETES URINE MICROALBUMIN  1959     ZOSTER VACCINES (1 of 2) 1999     MEDICARE ANNUAL WELLNESS VISIT  2014     TD 18+ HE  2018     PNEUMOCOCCAL IMMUNIZATION 65+ LOW/MEDIUM RISK (2 of 2 - PPSV23) 08/15/2018     DIABETES HEMOGLOBIN A1C  10/25/2018     DIABETES FOLLOW-UP  2020     DIABETES OPHTHALMOLOGY EXAM  2020     DIABETES FOOT EXAM  10/02/2020     FALL RISK ASSESSMENT  10/02/2020     ADVANCE CARE PLANNING  2021     COLONOSCOPY  2024        Subjective:   Chief Complaint: Chemo Casillas is an 69 y.o. male here for an Annual Wellness visit.   HPI: This 69-year-old man comes in with his sister-in-law for annual Medicare wellness visit.  Overall feeling okay.  Blood sugars have been okay.  No chest pain or shortness of breath.  Tolerating medications.  Some urinary incontinence which is chronic and stable.  No recent falls.    Review of Systems:  Please see above.  The rest of the review of systems are negative for all systems.    Patient Care Team:  Cody Pacheco MD as PCP - General (Internal Medicine)  Cody Pacheco MD as Assigned PCP     Patient Active Problem List   Diagnosis     Essential hypertension     Diabetes mellitus, type 2 (H)     Hyperlipidemia     Bilateral cataracts     Obesity     Chronic kidney disease, stage III (moderate) (H)     Past Medical History:   Diagnosis Date     Diabetes (H)      Hypertension       Past Surgical History:   Procedure Laterality Date     NO PAST SURGERIES        Family History   Problem Relation Age of Onset     Aortic Valve Replacement Mother               Mitral Valve Replacement Brother         , staph endocarditis     Melanoma Father                 Social History     Socioeconomic History     Marital status: Single     Spouse name: Not on file     Number of children: Not on file     Years of education: Not on file     Highest education level: Not on file   Occupational History     Not on file   Social Needs     Financial resource strain: Not on file     Food insecurity:     Worry: Not on file     Inability: Not on file     Transportation needs:     Medical: Not on file     Non-medical: Not on file   Tobacco Use     Smoking status: Never Smoker     Smokeless tobacco: Never Used   Substance and Sexual Activity     Alcohol use: Yes     Comment: rare     Drug use: No     Sexual activity: Not on file   Lifestyle     Physical activity:     Days per week: Not on file     Minutes per session: Not on file     Stress: Not on file   Relationships     Social connections:     Talks on phone: Not on file     Gets together: Not on file     Attends [employer] service: Not on file     Active member of club or organization: Not on file     Attends meetings of clubs or organizations: Not on file     Relationship status: Not on file     Intimate partner violence:     Fear of current or ex partner: Not on file     Emotionally abused: Not on file     Physically abused: Not on file     Forced sexual activity: Not on file   Other Topics Concern     Not on file   Social History Narrative    Lives alone.  Retired from [employer].       Current Outpatient Medications   Medication Sig Dispense Refill     amLODIPine (NORVASC) 10 MG tablet TAKE 1 TABLET(10 MG) BY MOUTH DAILY 90 tablet 3     aspirin 81 MG EC tablet Take 81 mg by mouth daily.       blood glucose test (CONTOUR NEXT TEST STRIPS) strips Test each day as directed. 30 strip 11     generic lancets Test each day as directed. 30 each 11     hydroCHLOROthiazide (HYDRODIURIL) 25 MG tablet TAKE 1 TABLET(25 MG) BY MOUTH DAILY 90 tablet 3     lisinopril (PRINIVIL,ZESTRIL) 20 MG tablet TAKE 1 TABLET(20 MG) BY MOUTH DAILY 90 tablet  "3     metFORMIN (GLUCOPHAGE) 1000 MG tablet TAKE 1 TABLET BY MOUTH TWICE DAILY WITH MEALS. 180 tablet 3     simvastatin (ZOCOR) 20 MG tablet TAKE 1 TABLET(20 MG) BY MOUTH DAILY 90 tablet 3     No current facility-administered medications for this visit.       Objective:   Vital Signs:   Visit Vitals  /78 (Patient Site: Left Arm, Patient Position: Sitting, Cuff Size: Adult Regular)   Pulse 75   Ht 5' 6.5\" (1.689 m)   Wt 203 lb (92.1 kg)   SpO2 97%   BMI 32.27 kg/m         VisionScreening:  No exam data present     PHYSICAL EXAM  EYES: Eyelids, conjunctiva, and sclera were normal. Pupils were normal. Cornea, iris, and lens were normal bilaterally.  HEAD, EARS, NOSE, MOUTH, AND THROAT: Head and face were normal. Hearing was normal to voice and the ears were normal to external exam. Nose appearance was normal and there was no discharge. Oropharynx was normal.  NECK: Neck appearance was normal. There were no neck masses and the thyroid was not enlarged.  RESPIRATORY: Breathing pattern was normal and the chest moved symmetrically.  Percussion/auscultatory percussion was normal.  Lung sounds were normal and there were no abnormal sounds.  CARDIOVASCULAR: Heart rate and rhythm were normal.  S1 and S2 were normal and there were no extra sounds or murmurs. Peripheral pulses in arms and legs were normal.  Jugular venous pressure was normal.  There was no peripheral edema.  GASTROINTESTINAL: The abdomen was normal in contour.  Bowel sounds were present.  Percussion detected no organ enlargement or tenderness.  Palpation detected no tenderness, mass, or enlarged organs.   MUSCULOSKELETAL: Skeletal configuration was normal and muscle mass was normal for age. Joint appearance was overall normal.  LYMPHATIC: There were no enlarged nodes.  SKIN/HAIR/NAILS: Skin color was normal.  There were no skin lesions.  Hair and nails were normal.  NEUROLOGIC: The patient was alert and oriented to person, place, time, and circumstance. " Speech was normal. Cranial nerves were normal. Motor strength was normal for age. The patient was normally coordinated.  PSYCHIATRIC:  Mood and affect were normal and the patient had normal recent and remote memory. The patient's judgment and insight were normal.      Assessment Results 10/2/2019   Activities of Daily Living No help needed   Instrumental Activities of Daily Living No help needed   Get Up and Go Score Less than 12 seconds   Mini Cog Total Score 5   Some recent data might be hidden     A Mini-Cog score of 0-2 suggests the possibility of dementia, score of 3-5 suggests no dementia    Identified Health Risks:     He is at risk for lack of exercise and has been provided with information to increase physical activity for the benefit of his well-being.  The patient was provided with written information regarding signs of hearing loss.  Information regarding advance directives (living grimes), including where he can download the appropriate form, was provided to the patient via the AVS.

## 2021-06-02 ENCOUNTER — COMMUNICATION - HEALTHEAST (OUTPATIENT)
Dept: INTERNAL MEDICINE | Facility: CLINIC | Age: 71
End: 2021-06-02

## 2021-06-02 DIAGNOSIS — E78.5 HYPERLIPIDEMIA, UNSPECIFIED HYPERLIPIDEMIA TYPE: ICD-10-CM

## 2021-06-02 DIAGNOSIS — E11.9 DIABETES MELLITUS, TYPE 2 (H): ICD-10-CM

## 2021-06-02 DIAGNOSIS — I10 ESSENTIAL HYPERTENSION WITH GOAL BLOOD PRESSURE LESS THAN 140/90: ICD-10-CM

## 2021-06-02 DIAGNOSIS — E66.9 OBESITY: ICD-10-CM

## 2021-06-02 DIAGNOSIS — I10 HYPERTENSION: ICD-10-CM

## 2021-06-02 RX ORDER — HYDROCHLOROTHIAZIDE 25 MG/1
25 TABLET ORAL DAILY
Qty: 30 TABLET | Refills: 1 | Status: SHIPPED | OUTPATIENT
Start: 2021-06-02 | End: 2021-08-08

## 2021-06-02 RX ORDER — LISINOPRIL 20 MG/1
20 TABLET ORAL DAILY
Qty: 30 TABLET | Refills: 1 | Status: SHIPPED | OUTPATIENT
Start: 2021-06-02 | End: 2021-08-08

## 2021-06-02 RX ORDER — AMLODIPINE BESYLATE 10 MG/1
10 TABLET ORAL DAILY
Qty: 30 TABLET | Refills: 1 | Status: SHIPPED | OUTPATIENT
Start: 2021-06-02 | End: 2021-08-08

## 2021-06-03 VITALS
HEIGHT: 67 IN | SYSTOLIC BLOOD PRESSURE: 128 MMHG | BODY MASS INDEX: 31.86 KG/M2 | HEART RATE: 75 BPM | DIASTOLIC BLOOD PRESSURE: 78 MMHG | OXYGEN SATURATION: 97 % | WEIGHT: 203 LBS

## 2021-06-07 NOTE — TELEPHONE ENCOUNTER
Refill Approved    Rx renewed per Medication Renewal Policy. Medication was last renewed on 4/26/19.    Angelia Carnes, [facility] Triage/Med Refill 4/17/2020     Requested Prescriptions   Pending Prescriptions Disp Refills     simvastatin (ZOCOR) 20 MG tablet [Pharmacy Med Name: SIMVASTATIN 20MG TABLETS] 90 tablet 3     Sig: TAKE 1 TABLET(20 MG) BY MOUTH DAILY       Statins Refill Protocol (Hmg CoA Reductase Inhibitors) Passed - 4/16/2020  5:15 PM        Passed - PCP or prescribing provider visit in past 12 months      Last office visit with prescriber/PCP: 4/25/2018 Cody Pacheco MD OR same dept: Visit date not found OR same specialty: Visit date not found  Last physical: 10/2/2019 Last MTM visit: Visit date not found   Next visit within 3 mo: Visit date not found  Next physical within 3 mo: Visit date not found  Prescriber OR PCP: Cody Pacheco MD  Last diagnosis associated with med order: 1. Diabetes mellitus, type 2 (H)  - simvastatin (ZOCOR) 20 MG tablet [Pharmacy Med Name: SIMVASTATIN 20MG TABLETS]; TAKE 1 TABLET(20 MG) BY MOUTH DAILY  Dispense: 90 tablet; Refill: 3    2. Essential hypertension with goal blood pressure less than 140/90  - simvastatin (ZOCOR) 20 MG tablet [Pharmacy Med Name: SIMVASTATIN 20MG TABLETS]; TAKE 1 TABLET(20 MG) BY MOUTH DAILY  Dispense: 90 tablet; Refill: 3    3. Hyperlipidemia, unspecified hyperlipidemia type  - simvastatin (ZOCOR) 20 MG tablet [Pharmacy Med Name: SIMVASTATIN 20MG TABLETS]; TAKE 1 TABLET(20 MG) BY MOUTH DAILY  Dispense: 90 tablet; Refill: 3    4. Obesity  - simvastatin (ZOCOR) 20 MG tablet [Pharmacy Med Name: SIMVASTATIN 20MG TABLETS]; TAKE 1 TABLET(20 MG) BY MOUTH DAILY  Dispense: 90 tablet; Refill: 3    If protocol passes may refill for 12 months if within 3 months of last provider visit (or a total of 15 months).

## 2021-06-08 ENCOUNTER — RECORDS - HEALTHEAST (OUTPATIENT)
Dept: ADMINISTRATIVE | Facility: OTHER | Age: 71
End: 2021-06-08

## 2021-06-08 LAB — RETINOPATHY: NEGATIVE

## 2021-06-08 NOTE — PROGRESS NOTES
Office Visit - Follow Up   Chemo Casillas   67 y.o. male    Date of Visit: 2/3/2017    Chief Complaint   Patient presents with     Diabetes        Assessment and Plan   1. Diabetes mellitus, type 2  Well-controlled, continue current medications, annual eye exam, excellent foot care regularly exercise healthy diet  - lisinopril (PRINIVIL,ZESTRIL) 20 MG tablet; Take 1 tablet (20 mg total) by mouth daily.  Dispense: 90 tablet; Refill: 3  - simvastatin (ZOCOR) 20 MG tablet; Take 1 tablet (20 mg total) by mouth daily.  Dispense: 90 tablet; Refill: 3  - Glycosylated Hemoglobin A1c  - Comprehensive Metabolic Panel; Future  - Lipid Cascade; Future    2. Essential hypertension with goal blood pressure less than 140/90  Control continue current medications  - lisinopril (PRINIVIL,ZESTRIL) 20 MG tablet; Take 1 tablet (20 mg total) by mouth daily.  Dispense: 90 tablet; Refill: 3  - simvastatin (ZOCOR) 20 MG tablet; Take 1 tablet (20 mg total) by mouth daily.  Dispense: 90 tablet; Refill: 3  - Glycosylated Hemoglobin A1c  - Comprehensive Metabolic Panel; Future  - Lipid Cascade; Future    3. Hyperlipidemia, unspecified hyperlipidemia type  Continue statin  - lisinopril (PRINIVIL,ZESTRIL) 20 MG tablet; Take 1 tablet (20 mg total) by mouth daily.  Dispense: 90 tablet; Refill: 3  - simvastatin (ZOCOR) 20 MG tablet; Take 1 tablet (20 mg total) by mouth daily.  Dispense: 90 tablet; Refill: 3  - Glycosylated Hemoglobin A1c  - Comprehensive Metabolic Panel; Future  - Lipid Cascade; Future    4. Obesity  - lisinopril (PRINIVIL,ZESTRIL) 20 MG tablet; Take 1 tablet (20 mg total) by mouth daily.  Dispense: 90 tablet; Refill: 3  - simvastatin (ZOCOR) 20 MG tablet; Take 1 tablet (20 mg total) by mouth daily.  Dispense: 90 tablet; Refill: 3  - Glycosylated Hemoglobin A1c  - Comprehensive Metabolic Panel; Future  - Lipid Cascade; Future  The following high BMI interventions were performed this visit: encouragement to exercise and lifestyle  "education regarding diet    Return in about 6 months (around 8/3/2017) for recheck.     History of Present Illness   This 67 y.o. old man comes in for follow-up of diabetes, hypertension, hyperlipidemia, obesity.  He is accompanied by family.  He is doing well, taking medications as prescribed.  No side effects.  No chest pain or shortness of breath.  Blood sugars have been well-controlled.  Trying to exercise.     Review of Systems: A comprehensive review of systems was negative except as noted.     Medications, Allergies and Problem List   Reviewed and updated     Physical Exam   General Appearance:   No acute distress    Visit Vitals     /76 (Patient Site: Right Arm, Patient Position: Sitting, Cuff Size: Adult Regular)     Pulse 76     Ht 5' 6.5\" (1.689 m)     Wt 197 lb (89.4 kg)     SpO2 95%     BMI 31.32 kg/m2       HEENT exam is unremarkable, neck supple, no cervical lymphadenopathy, cardiovascular regular rate and rhythm no murmur gallop or rub, lungs are clear to auscultation bilaterally, abdomen soft nontender nondistended no organomegaly, neurologic exam is nonfocal, psychiatric pleasant, no confusion or agitation   Normal diabetic foot exam      Additional Information   Current Outpatient Prescriptions   Medication Sig Dispense Refill     amLODIPine (NORVASC) 10 MG tablet Take 1 tablet (10 mg total) by mouth daily. 90 tablet 3     aspirin 81 MG EC tablet Take 81 mg by mouth daily.       blood glucose test (CONTOUR NEXT STRIPS) strips Test each day as directed. 30 each 11     generic lancets Test each day as directed. 30 each 11     hydrochlorothiazide (HYDRODIURIL) 25 MG tablet Take 1 tablet (25 mg total) by mouth daily. 90 tablet 3     lisinopril (PRINIVIL,ZESTRIL) 20 MG tablet Take 1 tablet (20 mg total) by mouth daily. 90 tablet 3     metFORMIN (GLUCOPHAGE) 1000 MG tablet Take 1 tablet (1,000 mg total) by mouth 2 (two) times a day with meals. 180 tablet 3     simvastatin (ZOCOR) 20 MG tablet Take " 1 tablet (20 mg total) by mouth daily. 90 tablet 3     No current facility-administered medications for this visit.      No Known Allergies  Social History   Substance Use Topics     Smoking status: Never Smoker     Smokeless tobacco: Never Used     Alcohol use Yes      Comment: rare       Review and/or order of clinical lab tests:  Review and/or order of radiology tests:  Review and/or order of medicine tests:  Discussion of test results with performing physician:  Decision to obtain old records and/or obtain history from someone other than the patient:  Review and summarization of old records and/or obtaining history from someone other than the patient and.or discussion of case with another health care provider:  Independent visualization of image, tracing or specimen itself:    Time:      Cody Pacheco MD

## 2021-06-08 NOTE — PROGRESS NOTES
My Clinic Care Coordination Care Plan    [facility] [address]  [address]  [address] [place]  [address]  [phone]    My Preferred Method of Contact:  Phone: [phone]    My Primary/Preferred Language:  English    Preferred Learning Style:  Face to face discussion    Emergency Contact: Extended Emergency Contact Information  Primary Emergency Contact: Suzy Alonso   [facility]  Home Phone: [phone]  Relation: Sister-In-Law     PCP:  Cody Pacheco MD  Specialists:      Hospitalizations and/or ED Visits  Most Recent: 12/2/2015  Previous: 10/23/2003  Reason:  Hypertension     Concerns regarding my health: I'd like to manage my medications and get more exercise.    Advanced Directive/Living Will: The patient was given information regarding Adanced Directives/Living Will      Chemo elected to enroll in the [facility] Care Coordination.  Chemo was given a copy of the Clinic Care Coordination brochure and full description of how to access their care team both during clinic hours and after hours.   My Care Guide's Name and Phone Number: Rene - [phone]  The Care Guide and myself agreed to be in contact monthly.      Medication Update  The patient's medication list is up to date per Samantha    Health Maintenance and Immunization Update  The patient's preventive health screenings and immunizations are up to date per Samantha.    My Emergency Plan:       All [facility] clinic patients have access to a Nurse 24 hours a day, 7 days a week.  If you have questions or want advice from a Nurse, please know [facility] is here for you.  You can call your clinic and they will connect you or you can call [facility] at [phone].  [facility] also has Walk In Care clinics in multiple locations.  Call the number listed above for more information about our Walk In Care clinics or visit the [facility] website at [email or web address].    Patient  Support  I will ask my emergency contact for help in supporting me in these goals.  Relationship to patient: [id number]  Home # : [phone] , best time to call 24/7

## 2021-06-08 NOTE — PROGRESS NOTES
Assessment:   Time spent with the patient: 60 minutes for diabetes education and counseling.   Previous Education: uncertain  Visit Type:MNT  Hours Remaining: DSMT 8 and MNT 1            Lab Results   Component Value Date    HGBA1C 5.4 07/13/2016         Plan / Patient Instructions:          Goals       activity            Swim at [employer] M-W- F.  HAS NOT STARTED        medications            Take as directed.  DOING        monitoring            Test BG each day  TESTING FBG A FEW TIMES EACH WEEK             Appointments to be scheduled (Appointment center [phone]):   Primary care visit      Subjective:       Chemo Casillas is referred by Dr Cody Pacheco  for Diabetes Education.     Accompanied by: sister in law, Suzy    Previous Diabetes Education? uncertain    Diabetes medications taken: Metformin .  1000 mg bid    Chemo arrived today with detailed and information on BG, diet and weight records.  FBG is in target range with one exception in the past several months.  He is testing each week.  He is taking medications as prescribed and [facility].  Pt may be interested in med set up and may be a good candidate for care team.    He is eating three meals per day and is including some non-starchy veg, dairy and protein with each meal.  He continues to focus on portion sizes.  He has not started at [employer] and still plans on starting.  He does walk Suzy's dog when she is out of town.  He keeps track of the route he takes.    Provided sick day guidelines and discussed.      Past note:   He discussed how his Dad swam every day.   Past note:   He lost approximately 40 lbs in the past when he was working and walked at his job.  He is now retired and would like to swim.  He has a membership however has not used it lately.  He prefers to go 3x/week.          Objective:   Physical Activity: Used to walk while he worked at [employer]-now retired  Swims at a  health club -[place] [employer] has member ship.  Plans to start 3x/week  HAS NOT STARTED  Now walking the dog when Suzy is gone.      Diet/Eating Habits:   B-poached eggs, OJ with toast or cereal and blueberries or granola with skim milk and yogurt or omlet with veg  s-airpopped popcorn or fruit (apple or banana)  L-variation of sand with meat or cheese or 1 soft shell taco (truck 1 block away) with pork or chicken with onions with hot sauce and unsweetened tea or liverwurst/ jelly sand  s-popcorn  D-makes own fowl (dilan game hen) New stove in Jan with froz veg steamed (broccoli or corn or cauliflower) and potatoes (boiled or baked)  Used to eat fast food a lot ([employer] or [employer])  HS-green grapes     D/C regular pop.  Now drinks unsweetened ice tea    SMBG pattern/BG ranges: in target range with one exception at 131 mg/dl in past few months.        Monitoring   Meter (per above goals): Assessed and Discussed  Monitoring: Assessed and Discussed  BG goals: Assessed and Discussed    Nutrition Management  Nutrition Management: Assessed and Discussed  Weight: Assessed and Discussed  Portions/Balance: Assessed and Discussed  Carb ID/Count: Assessed and Discussed  Label Reading: Assessed  Heart Healthy Fats: Needs instruction/review at follow-up  Menu Planning: Needs instruction/review at follow-up  Dining Out: Needs instruction/review at follow-up  Physical Activity: Assessed and Discussed  Medications: Assessed  Orals: Assessed  Injected Medications: Not addressed   Storage/Exp:Not addressed   Site Rotation: Not addressed   Sites Assessed: no    Diabetes Disease Process: Assessed    Acute Complications: Prevent, Detect, Treat:  Hypoglycemia: Assessed  Hyperglycemia: Assessed  Sick Days: Assessed, Discussed and Literature provided-1/25/17  Driving: Not addressed    Chronic Complications  Foot Care:Assessed, Discussed and Literature provided  Skin Care: Assessed and Discussed  Eye: Assessed and Discussed  ABC:  Needs instruction/review at follow-up  Teeth:Assessed and Discussed  Goal Setting and Problem Solving: Needs instruction/review at follow-up  Barriers: Assessed  Psychosocial Adjustments: Assessed      Roxanne De Leon RD, LD, CDE  1/25/2017  9:40 AM

## 2021-06-08 NOTE — PROGRESS NOTES
The Clinic Care Guide contacted  the patient in clinic today  to discuss possible clinic care coordination enrollment. Clinic care coordination was described to the patient and immediate needs were discussed. The patient agreed to enrollment in clinic care coordination and future appointments were scheduled for an RN care coordination assessment and an enrollment visit with the primary care physician and care guide.  PCAM 1/31/17  Enrollment 2/3/17

## 2021-06-08 NOTE — TELEPHONE ENCOUNTER
RN cannot approve Refill Request    RN can NOT refill this medication Protocol failed and NO refill given.    Angelia Carnes, [facility] Triage/Med Refill 5/18/2020    Requested Prescriptions   Pending Prescriptions Disp Refills     metFORMIN (GLUCOPHAGE) 1000 MG tablet [Pharmacy Med Name: METFORMIN 1000MG TABLETS] 180 tablet 3     Sig: TAKE 1 TABLET BY MOUTH TWICE DAILY WITH MEALS.       Metformin Refill Protocol Failed - 5/15/2020 10:22 AM        Failed - Visit with PCP or prescribing provider visit in last 6 months or next 3 months     Last office visit with prescriber/PCP: Visit date not found OR same dept: Visit date not found OR same specialty: Visit date not found Last physical: Visit date not found Last MTM visit: Visit date not found         Next appt within 3 mo: Visit date not found  Next physical within 3 mo: Visit date not found  Prescriber OR PCP: Cody Pacheco MD  Last diagnosis associated with med order: 1. Diabetes mellitus, type 2 (H)  - metFORMIN (GLUCOPHAGE) 1000 MG tablet [Pharmacy Med Name: METFORMIN 1000MG TABLETS]; TAKE 1 TABLET BY MOUTH TWICE DAILY WITH MEALS.  Dispense: 180 tablet; Refill: 3    2. Essential hypertension with goal blood pressure less than 140/90  - amLODIPine (NORVASC) 10 MG tablet; TAKE 1 TABLET(10 MG) BY MOUTH DAILY  Dispense: 90 tablet; Refill: 1    3. Hypertension  - hydroCHLOROthiazide (HYDRODIURIL) 25 MG tablet; TAKE 1 TABLET(25 MG) BY MOUTH DAILY  Dispense: 90 tablet; Refill: 1     If protocol passes may refill for 12 months if within 3 months of last provider visit (or a total of 15 months).           Failed - A1C in last 6 months     Hemoglobin A1c   Date Value Ref Range Status   10/02/2019 5.8 3.5 - 6.0 % Final               Failed - Microalbumin in last year      No results found for: MICROALBUR               Passed - Blood pressure in last 12 months     BP Readings from Last 1 Encounters:   10/02/19 128/78             Passed - LFT or AST or ALT in last  12 months     Albumin   Date Value Ref Range Status   10/02/2019 4.3 3.5 - 5.0 g/dL Final     Bilirubin, Total   Date Value Ref Range Status   10/02/2019 0.4 0.0 - 1.0 mg/dL Final     Alkaline Phosphatase   Date Value Ref Range Status   10/02/2019 40 (L) 45 - 120 U/L Final     AST   Date Value Ref Range Status   10/02/2019 17 0 - 40 U/L Final     ALT   Date Value Ref Range Status   10/02/2019 19 0 - 45 U/L Final     Protein, Total   Date Value Ref Range Status   10/02/2019 7.3 6.0 - 8.0 g/dL Final                Passed - GFR or Serum Creatinine in last 6 months     GFR MDRD Non Af Amer   Date Value Ref Range Status   10/02/2019 >60 >60 mL/min/1.73m2 Final     GFR MDRD Af Amer   Date Value Ref Range Status   10/02/2019 >60 >60 mL/min/1.73m2 Final              Signed Prescriptions Disp Refills    amLODIPine (NORVASC) 10 MG tablet 90 tablet 1     Sig: TAKE 1 TABLET(10 MG) BY MOUTH DAILY       Calcium-Channel Blockers Protocol Passed - 5/15/2020 10:22 AM        Passed - PCP or prescribing provider visit in past 12 months or next 3 months     Last office visit with prescriber/PCP: 4/25/2018 Cody Pacheco MD OR same dept: Visit date not found OR same specialty: Visit date not found  Last physical: 10/2/2019 Last MTM visit: Visit date not found   Next visit within 3 mo: Visit date not found  Next physical within 3 mo: Visit date not found  Prescriber OR PCP: Cody Pacheco MD  Last diagnosis associated with med order: 1. Diabetes mellitus, type 2 (H)  - metFORMIN (GLUCOPHAGE) 1000 MG tablet [Pharmacy Med Name: METFORMIN 1000MG TABLETS]; TAKE 1 TABLET BY MOUTH TWICE DAILY WITH MEALS.  Dispense: 180 tablet; Refill: 3    2. Essential hypertension with goal blood pressure less than 140/90  - amLODIPine (NORVASC) 10 MG tablet; TAKE 1 TABLET(10 MG) BY MOUTH DAILY  Dispense: 90 tablet; Refill: 1    3. Hypertension  - hydroCHLOROthiazide (HYDRODIURIL) 25 MG tablet; TAKE 1 TABLET(25 MG) BY MOUTH DAILY  Dispense: 90  tablet; Refill: 1    If protocol passes may refill for 12 months if within 3 months of last provider visit (or a total of 15 months).             Passed - Blood pressure filed in past 12 months     BP Readings from Last 1 Encounters:   10/02/19 128/78               hydroCHLOROthiazide (HYDRODIURIL) 25 MG tablet 90 tablet 1     Sig: TAKE 1 TABLET(25 MG) BY MOUTH DAILY       Diuretics/Combination Diuretics Refill Protocol  Passed - 5/15/2020 10:22 AM        Passed - Visit with PCP or prescribing provider visit in past 12 months     Last office visit with prescriber/PCP: 4/25/2018 Cody Pacheco MD OR same dept: Visit date not found OR same specialty: Visit date not found  Last physical: 10/2/2019 Last MTM visit: Visit date not found   Next visit within 3 mo: Visit date not found  Next physical within 3 mo: Visit date not found  Prescriber OR PCP: Cody Pacheco MD  Last diagnosis associated with med order: 1. Diabetes mellitus, type 2 (H)  - metFORMIN (GLUCOPHAGE) 1000 MG tablet [Pharmacy Med Name: METFORMIN 1000MG TABLETS]; TAKE 1 TABLET BY MOUTH TWICE DAILY WITH MEALS.  Dispense: 180 tablet; Refill: 3    2. Essential hypertension with goal blood pressure less than 140/90  - amLODIPine (NORVASC) 10 MG tablet; TAKE 1 TABLET(10 MG) BY MOUTH DAILY  Dispense: 90 tablet; Refill: 1    3. Hypertension  - hydroCHLOROthiazide (HYDRODIURIL) 25 MG tablet; TAKE 1 TABLET(25 MG) BY MOUTH DAILY  Dispense: 90 tablet; Refill: 1    If protocol passes may refill for 12 months if within 3 months of last provider visit (or a total of 15 months).             Passed - Serum Potassium in past 12 months      Lab Results   Component Value Date    Potassium 4.3 10/02/2019             Passed - Serum Sodium in past 12 months      Lab Results   Component Value Date    Sodium 142 10/02/2019             Passed - Blood pressure on file in past 12 months     BP Readings from Last 1 Encounters:   10/02/19 128/78             Passed - Serum  Creatinine in past 12 months      Creatinine   Date Value Ref Range Status   10/02/2019 1.01 0.70 - 1.30 mg/dL Final

## 2021-06-08 NOTE — PROGRESS NOTES
"Clinic Care Coordination RN Assessment:  Telephone RN Assessment with patient. He states of being single, never  and  worked at [employer] in . 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 States of checking blood glucose 1-2 x a day a few times a week and weight loss helped his blood sugars. Weighs self daily. He mentioned of previous weight of 230 pounds and wants to avoid gaining weigh by diet and being more active. He manages his daily medications with assistance and supervision of his sister in law Cervantes (Suzy). He prefers to have Suzy for support and trust her \" family  is better than anything else\". She takes him to medical appointments and helps him with understanding and following through. Unsure if he is hard of hearing or have difficulty with hearing on the phone. He mentioned his parents both had hearing aids. Patient verbalizes understanding and agreement with participating in [facility] for support and resources to help him with improving understanding of his health  and goal of healthy weight management.     Action Plan:  Total PCAM Score: 16  Little to no RN or  intervention needed. Recommend standard Care Guide outreach for coordination needs. The Care Guide can reach out to RN /SW as needed for support or with new concerns.     RN Will:  1)  Not add to RN panel for monitoring. Not reach out to patient at this time.   2) Defer to Care Guide for outreach. The Care Guide can reach out to RN/SW as needed for support or with new concerns.    Care Guide Will:  Care Guide Delegation:  1) Due Date:  scheduled on 2/3/17  Delegation: Help the patient to coordinate goal setting visit if not already coordinated.     2) Due Date: At Goal setting visit      Delegation: Review goals set at PCAM visit and create or add to action plan.     Goals        Patient Stated      I have a goal to be physically fit. " (pt-stated)            Action steps to achieve this goal  1. I will stay active and attend my medical appointments.   2.  I will go to my gym to swim.   3.  I will talk with my Care Guide at Outreach calls for exercise / activities information    Date goal set:  17         Other      activity            Swim at [employer] -- [facility].  HAS NOT STARTED        medications            Take as directed.  DOING        monitoring            Test BG each day  TESTING FBG A FEW TIMES EACH WEEK           Diabetic population of intervention patient  A1C: 2/3/17 5.6  Last Diabetic Eye Exam: Have new glasses.   Last Diabetic Foot Exam: Denies problems with feet.     1. What have blood sugars been running? Low 100's   2. How often does the patient check blood sugars? Few times a week   3. Has the patient felt symptomatic with low readings?  Denies  4. What does the patient do to feel better or raise blood sugars? Drink 4 0z of OJ or milk and recheck BG.     PCAM (Patient Centered Assessment Method)     Health and Well-Bein    Physical health needs:    Drives self.     Independent with doing ADL's and IADL's and denies concerns.     Planning to resume swimming when pool is available in February.     Have new pair of glasses.     Mental Health Needs:    Verbalizes needs and concerns appropriately.    Denies concerns. Feels safe in his community and surroundings.     Lifestyle/Habits    Likes outings in his community. Walking.     Does not drink Soda pop and avoid sweets.     Social Environment:5    Home Environment:    Lives alone in a  Two story house - own.     Daily Activities:    Maintain daily chores.     Outings.    Social Network:    Family. Talks on the phone and sees a few times a week.     Financial Status and Services:    Have Social Security.    Denies financial concerns or unpaid bills.     Health Literacy and Communication:4    Understanding of Health and Wellbeing:    Good. Have additional support of his HUMBERTO (  Suzy) who attends appointments with him.   Engagement:    History of attending appointments.   Compliance with Medical Recommendations to date:    Good. Can benefit from CCC support.       Service Coordination:2    Services Needed:    Standard Care Guide outreach to patient to provide support with patient's concerns and goal.    Coordination of Services:    Standard Care Guide outreach to patient to provide support with patient's concerns and goal.    Emergency Plan:  Diabetes: Sick-Day Plan  Infections, the flu, and even a cold, can cause your blood sugar to rise. And, eating less, nausea, and vomiting may cause your blood glucose to fall (hypoglycemia). Ask your health care provider to help you develop a sick-day plan. The following information can help.    Call your health care provider if:    You vomit or have diarrhea for more than 6 hours.    Your blood glucose level is higher than usual or over 250 mg/dL after you have taken extra insulin (if recommended in your sick-day plan).    You take oral medicine for diabetes, and your blood sugar is higher than usual or over 250 mg/dL, before a meal and stays that high for more than 24 hours.    Your blood glucose is lower than usual or less than 70 mg/dL    You have moderate to large amounts of ketones in your blood or urine.    You aren t better after 2 days.

## 2021-06-09 NOTE — TELEPHONE ENCOUNTER
Refill Approved    Rx renewed per Medication Renewal Policy. Medication was last renewed on 04/26/2019.  Last office visit was 10/02/2019 with PCP.    Yolande Yanes, [facility] Triage/Med Refill 6/24/2020     Requested Prescriptions   Pending Prescriptions Disp Refills     lisinopriL (PRINIVIL,ZESTRIL) 20 MG tablet [Pharmacy Med Name: LISINOPRIL 20MG TABLETS] 90 tablet 3     Sig: TAKE 1 TABLET(20 MG) BY MOUTH DAILY       Ace Inhibitors Refill Protocol Passed - 6/24/2020 10:29 AM        Passed - PCP or prescribing provider visit in past 12 months       Last office visit with prescriber/PCP: 4/25/2018 Cody Pacheco MD OR same dept: Visit date not found OR same specialty: Visit date not found  Last physical: 10/2/2019 Last MTM visit: Visit date not found   Next visit within 3 mo: Visit date not found  Next physical within 3 mo: Visit date not found  Prescriber OR PCP: Cody Pacheco MD  Last diagnosis associated with med order: 1. Diabetes mellitus, type 2 (H)  - lisinopriL (PRINIVIL,ZESTRIL) 20 MG tablet [Pharmacy Med Name: LISINOPRIL 20MG TABLETS]; TAKE 1 TABLET(20 MG) BY MOUTH DAILY  Dispense: 90 tablet; Refill: 3    2. Essential hypertension with goal blood pressure less than 140/90  - lisinopriL (PRINIVIL,ZESTRIL) 20 MG tablet [Pharmacy Med Name: LISINOPRIL 20MG TABLETS]; TAKE 1 TABLET(20 MG) BY MOUTH DAILY  Dispense: 90 tablet; Refill: 3    3. Hyperlipidemia, unspecified hyperlipidemia type  - lisinopriL (PRINIVIL,ZESTRIL) 20 MG tablet [Pharmacy Med Name: LISINOPRIL 20MG TABLETS]; TAKE 1 TABLET(20 MG) BY MOUTH DAILY  Dispense: 90 tablet; Refill: 3    4. Obesity  - lisinopriL (PRINIVIL,ZESTRIL) 20 MG tablet [Pharmacy Med Name: LISINOPRIL 20MG TABLETS]; TAKE 1 TABLET(20 MG) BY MOUTH DAILY  Dispense: 90 tablet; Refill: 3    If protocol passes may refill for 12 months if within 3 months of last provider visit (or a total of 15 months).             Passed - Serum Potassium in past 12 months      Lab Results   Component Value Date    Potassium 4.3 10/02/2019             Passed - Blood pressure filed in past 12 months     BP Readings from Last 1 Encounters:   10/02/19 128/78             Passed - Serum Creatinine in past 12 months     Creatinine   Date Value Ref Range Status   10/02/2019 1.01 0.70 - 1.30 mg/dL Final

## 2021-06-11 ENCOUNTER — RECORDS - HEALTHEAST (OUTPATIENT)
Dept: HEALTH INFORMATION MANAGEMENT | Facility: CLINIC | Age: 71
End: 2021-06-11

## 2021-06-11 NOTE — PROGRESS NOTES
Attempt 1-Care Guide called patient.  If this patient is returning our call please transfer to Marti at ext. [phone].

## 2021-06-11 NOTE — PROGRESS NOTES
MRN:  [id number]    Patient name: Chemo Casillas    Care Guide: Alize    Discuss at Care Conferences: 7/5/2017    Barriers: On track with swimming goal; doing well    Plan Summary: Maintenance??     Action  Due Date   CCC RN will:  ALLI    Delegations: Action  Due Date   CCC SPEEDY will:  ALLI    [facility] Care Guide will: Talk to pt about transitioning to maintenance Next Outreach

## 2021-06-11 NOTE — PROGRESS NOTES
Spoke to pt today and he stated that he is keeping up on his fitness and goes swimming frequently.  Stated that he went yesterday and was able to swim a bit and use the hot tub.  Pt stated that he is no longer using the machines at the gym as they're too difficult. But that pt enjoys swimming a lot.  CG asked pt if there was anything he needed at this time and he stated he is just fine.  CG then gave pt her contact info, but had to repeat spelling of CG name 6x before pt got it right.  CG also mailed out her business card to be safe.

## 2021-06-11 NOTE — PROGRESS NOTES
"  Assessment:   Time spent with the patient: 60 minutes for diabetes education and counseling.   Previous Education: uncertain  Visit Type:MNT  Hours Remaining: DSMT 8 and MNT 0            Lab Results   Component Value Date    HGBA1C 5.6 02/03/2017         Plan / Patient Instructions:          Goals       activity            Swim at [employer]  GOING ONCE PER MONTH and WALKING THE DOG OR YARD WORK        I have a goal to be physically fit. (pt-stated)            Action steps to achieve this goal  1. I will stay active and attend my medical appointments.   2.  I will go to my gym to swim.   3.  I will talk with my Care Guide at Outreach calls for exercise / activities information    Date goal set:  1/31/17        medications            Take as directed.  DOING        monitoring            Test BG each day  TESTING FBG A FEW TIMES EACH MONTH        nutrition            Continue to watch portion size of foods.            Appointments to be scheduled (Appointment center [phone]):   Primary care visit      Subjective:       Chemo Casillas is referred by Dr Cody Pacheco  for Diabetes Education.         Previous Diabetes Education? uncertain    Diabetes medications taken: Metformin .  1000 mg bid    Chemo arrived today with his BG meter and log book.  100 % of FBG are in target range when he tests 1-2 x/month.  Pt wanted to test BG in office to determine if he was testing appropriately which he was.  Pt would like a refill of test strips which were sent to pharmacy per request.    He is taking medications as prescribed and brought all medications in today for review.    Pt continues to lose weight and has lost additional 7 # in the past 4 months.  He stated he is watching portion sizes and is active with walking and working outside.  He has noted his waist size decreasing.  He is now around 36\".    Reviewed portion hand guides.  Pt reviewed several meals he typically eats.  He now cooks after his mother " .    Past note:   He discussed how his Dad swam every day.   Past note:   He lost approximately 40 lbs in the past when he was working and walked at his job.  He is now retired and would like to swim.  He has a membership however has not used it lately.  He prefers to go 3x/week.          Objective:   Physical Activity: Used to walk while he worked at [employer]-now retired  Swims at a health club -[employer] has member ship.  Plans to start 3x/week  HAS NOT STARTED  Now walking the dog when sister-in-law (Suzy) is gone.      Diet/Eating Habits:   B-poached eggs, OJ with toast or cereal and blueberries or granola with skim milk and yogurt or omlet with veg  s-airpopped popcorn or fruit (apple or banana)  L-variation of sand with meat or cheese or 1 soft shell taco (truck 1 block away) with pork or chicken with onions with hot sauce and unsweetened tea or liverwurst/ jelly sand  s-popcorn 3 c   D-makes own fowl (dilan game hen) New stove in  with froz veg steamed (broccoli or corn or cauliflower) and potatoes (boiled or baked)  Used to eat fast food a lot ([employer] or [employer])  HS-green grapes     D/C regular pop.  Now drinks unsweetened ice tea    SMBG pattern/BG ranges: 100 % in target range        Monitoring   Meter (per above goals): Assessed and Discussed  Monitoring: Assessed and Discussed  BG goals: Assessed and Discussed    Nutrition Management  Nutrition Management: Assessed and Discussed  Weight: Assessed and Discussed  Portions/Balance: Assessed and Discussed  Carb ID/Count: Assessed and Discussed  Label Reading: Assessed  Heart Healthy Fats: Needs instruction/review at follow-up  Menu Planning: Needs instruction/review at follow-up  Dining Out: Needs instruction/review at follow-up  Physical Activity: Assessed and Discussed  Medications: Assessed  Orals: Assessed  Injected Medications: Not addressed   Storage/Exp:Not addressed   Site Rotation: Not addressed   Sites  Assessed: no    Diabetes Disease Process: Assessed    Acute Complications: Prevent, Detect, Treat:  Hypoglycemia: Assessed  Hyperglycemia: Assessed  Sick Days: Assessed, Discussed and Literature provided-1/25/17  Driving: Not addressed    Chronic Complications  Foot Care:Assessed, Discussed and Literature provided  Skin Care: Assessed and Discussed  Eye: Assessed and Discussed  ABC: Needs instruction/review at follow-up  Teeth:Assessed and Discussed  Goal Setting and Problem Solving: Needs instruction/review at follow-up  Barriers: Assessed  Psychosocial Adjustments: Assessed      Roxanne De Leon RD, LD, CDE  6/7/2017  9:40 AM

## 2021-06-12 NOTE — PROGRESS NOTES
Attempt 2: Care Guide called patient.  If this patient is returning my call, please transfer to Alize at ext [phone].    Voice mail box not set up

## 2021-06-12 NOTE — PROGRESS NOTES
Attempt 3: Care Guide called patient.  If this patient is returning my call, please transfer to Alize at ext [phone].  I have called this patient 3 times over the past two weeks and have been unsuccessful in reaching him.  This patient has also not returned any of my messages.  I will continue attempting to reach out to this patient in one month.  I will also check this patient's chart for upcoming appointments, ER reports that may contain a new phone number, or any other recent activity.    **Voice mail not set up, unable to leave message**    Next Outreach: 10/09/2017

## 2021-06-12 NOTE — PROGRESS NOTES
Office Visit - Follow Up   Chemo Casillas   67 y.o. male    Date of Visit: 8/15/2017    Chief Complaint   Patient presents with     Diabetes        Assessment and Plan   1. Screen for colon cancer  - Ambulatory referral for Colonoscopy    2. Diabetes mellitus, type 2  Well-controlled, continue current meds are normal diabetic foot exam today  - simvastatin (ZOCOR) 20 MG tablet; Take 1 tablet (20 mg total) by mouth daily.  Dispense: 90 tablet; Refill: 3  - HM2(CBC w/o Differential)  - Comprehensive Metabolic Panel  - Lipid Cascade  - Glycosylated Hemoglobin A1c  - Thyroid Cascade  - Vitamin D, Total (25-Hydroxy)    3. Essential hypertension with goal blood pressure less than 140/90  Blood pressure control check labs    4. Hyperlipidemia, unspecified hyperlipidemia type  - simvastatin (ZOCOR) 20 MG tablet; Take 1 tablet (20 mg total) by mouth daily.  Dispense: 90 tablet; Refill: 3    5. Obesity  See below    6. Immunization due  - Pneumococcal polysaccharide vaccine 23-valent 3 yo or older, subq/IM    7. Poor balance  This is reported per family but on exam today he actually does quite well.  He has normal gait, able to tandem walk, normal sensation in his feet downgoing plantar reflex check labs and will have him see neurology  - Ambulatory referral to Neurology    8. Cognitive impairment  Occult to assess given his baseline, seems to be doing okay on brief exam today check labs and neurology referral  - Vitamin B12  - Ambulatory referral to Neurology    9. Chronic kidney disease, stage III (moderate)   - Vitamin D, Total (25-Hydroxy)    The following high BMI interventions were performed this visit: encouragement to exercise and lifestyle education regarding diet    Return in about 3 months (around 11/15/2017) for recheck.     History of Present Illness   This 67 y.o. old man comes in with his brother's wife and niece for follow-up of numerous medical problems.  Before the visit his sister-in-law asked me to  "visit independently.  She is concerned about some of his cognitive function and balance.  She spends a lot of time with him and is noticed that he has had worsened cognitive function.  He is more argumentative.  There have been some issues with grooming.  He has had some unsteadiness in his gait especially when they went on a hike down a hill.  This was on uneven terrain.  He continues to live alone.  He does all of his own shopping and cooking.  It sounds like he manages some of his finance but the family manages a lot of this.  He does continue to drive.  After meeting with her I did speak with our knee.  He feels he is doing quite well.  He continues to live independently.  He painted his whole house this summer.  He feels his driveway maintain his his lawn.  He drives without difficulty.  He has noted issues with making his own meals, grooming himself.  He denies any issues with balance.  He notes that he has lost about 40 pounds through healthy diet and exercise and is quite pleased with this    Review of Systems: A comprehensive review of systems was negative except as noted.     Medications, Allergies and Problem List   Reviewed and updated     Physical Exam   General Appearance:   No acute distress    /62 (Patient Site: Left Arm, Patient Position: Sitting, Cuff Size: Adult Regular)  Pulse 94  Ht 5' 6.5\" (1.689 m)  Wt 192 lb (87.1 kg)  SpO2 97%  BMI 30.53 kg/m2    HEENT exam is unremarkable neck supple cardiovascular regular rate and rhythm no murmur gallop or rub lungs clear to auscultation bilaterally abdomen is obese soft nontender nondistended no organomegaly neurologic exam is nonfocal including normal cranial nerves.  Normal reflexes sensation strength in the upper and lower extremities.  He has normal coordination and he is actually able to do a tandem walk fairly well.  He has intact memory, speech, word recall.     Additional Information   Current Outpatient Prescriptions   Medication Sig " Dispense Refill     amLODIPine (NORVASC) 10 MG tablet Take 1 tablet (10 mg total) by mouth daily. 90 tablet 3     amLODIPine (NORVASC) 2.5 MG tablet TAKE 2 TABLETS BY MOUTH ONCE DAILY 60 tablet 0     aspirin 81 MG EC tablet Take 81 mg by mouth daily.       blood glucose test (CONTOUR NEXT STRIPS) strips Test each day as directed. 30 each 11     generic lancets Test each day as directed. 30 each 11     hydroCHLOROthiazide (HYDRODIURIL) 25 MG tablet TAKE 1 TABLET(25 MG) BY MOUTH DAILY 90 tablet 0     lisinopril (PRINIVIL,ZESTRIL) 20 MG tablet Take 1 tablet (20 mg total) by mouth daily. 90 tablet 3     metFORMIN (GLUCOPHAGE) 1000 MG tablet TAKE 1 TABLET BY MOUTH TWICE DAILY WITH MEALS. 180 tablet 3     simvastatin (ZOCOR) 20 MG tablet Take 1 tablet (20 mg total) by mouth daily. 90 tablet 3     No current facility-administered medications for this visit.      No Known Allergies  Social History   Substance Use Topics     Smoking status: Never Smoker     Smokeless tobacco: Never Used     Alcohol use Yes      Comment: rare       Review and/or order of clinical lab tests:  Review and/or order of radiology tests:  Review and/or order of medicine tests:  Discussion of test results with performing physician:  Decision to obtain old records and/or obtain history from someone other than the patient:  Review and summarization of old records and/or obtaining history from someone other than the patient and.or discussion of case with another health care provider:  Independent visualization of image, tracing or specimen itself:    Time: total time spent with the patient was 40 minutes of which >50% was spent in counseling and coordination of care     Cody Pacheco MD

## 2021-06-12 NOTE — PROGRESS NOTES
Attempt 1: Care Guide called patient.  If this patient is returning my call, please transfer to Alize at ext [phone].    Voice mail box not set up.      Next Outreach: 08/30/2017

## 2021-06-13 NOTE — PROGRESS NOTES
Spoke with Suzy (sister). She says Chemo doesn't live with her and that I should call his home. CG verified phone #. CG updated demographics to reflect the change but left the comment about Suzy. CG will call pt's home phone.

## 2021-06-13 NOTE — PROGRESS NOTES
Care Guide called patient.  If this patient is returning my call, please transfer to Lilliana at ext [phone].  I have called Chemo and have been unsuccessful in reaching this patient for 2 months now.  This patient has also not returned any of my messages.  I will continue attempting to reach out to this patient in one month.  I will also check this patient's chart for upcoming appointments, ER reports that may contain a new phone number, or any other recent activity. CG to mail new business card to patient.    Next Outreach: 11/9/17

## 2021-06-14 NOTE — PROGRESS NOTES
Care Guide called patient.  If this patient is returning my call, please transfer to Alize at ext [phone].  I have called Chemo and have been unsuccessful in reaching this patient for 2 months now.  This patient has also not returned any of my messages.  I will continue attempting to reach out to this patient in one month.  I will also check this patient's chart for upcoming appointments, ER reports that may contain a new phone number, or any other recent activity.

## 2021-06-14 NOTE — PROGRESS NOTES
Assessment:   Time spent with the patient: 30 minutes for diabetes education and counseling.   Previous Education: uncertain  Visit Type:DSMT  Hours Remaining: DSMT 7 and MNT 0            Lab Results   Component Value Date    HGBA1C 5.4 08/15/2017         Plan / Patient Instructions:          Goals       activity            Swim at [employer] M-W- F.  TWICE PER WEEK        I have a goal to be physically fit. (pt-stated)            Action steps to achieve this goal    1. I will stay active and attend my medical appointments. 6/19/17  2.  I will go to my gym to swim at least once a week. 6/19/17  3.  I will talk with my Care Guide at Outreach calls for exercise / activities information    Date goal set:  1/31/17        medications            Take as directed.  DOING        monitoring            Test BG each day  TESTING FBG A FEW TIMES EACH MONTH        nutrition            Continue to watch portion size of foods.  DOING            Appointments to be scheduled (Appointment center [phone]):   Primary care visit      Subjective:       Chemo Casillas is referred by Dr Cody Pacheco  for Diabetes Education.         Previous Diabetes Education? uncertain    Diabetes medications taken: Metformin .  1000 mg bid    Chemo arrived today with his BG meter.  He is testing FBG a few times per month with 100 % of results in target range as indicated below.    He is taking medications as prescribed and does not forget.  He is continuing with positive lifestyle changes and review some of them today, ie not drinking pop and swimming a few times per week.  Encouraged him to continue.    He is meeting A1c, BP, aspirin, cholesterol and smoking goals.    Reviewed general sick day guidelines and provided copy.  Educated pt on sx of hyperglycemia and when to call PCP.            Past note:   He discussed how his Dad swam every day.   Past note:   He lost approximately 40 lbs in the past when he was working and walked at his job.  He  is now retired and would like to swim.  He has a membership however has not used it lately.  He prefers to go 3x/week.          Objective:   Physical Activity: Used to walk while he worked at [employer]-now retired  Swims at a health club -[employer] has member ship.  Plans to start 3x/week  HAS NOT STARTED  Now walking the dog when sister-in-law (Suzy) is gone.      Diet/Eating Habits:   B-poached eggs, OJ with toast or cereal and blueberries or granola with skim milk and yogurt or omlet with veg  s-airpopped popcorn or fruit (apple or banana)  L-variation of sand with meat or cheese or 1 soft shell taco (truck 1 block away) with pork or chicken with onions with hot sauce and unsweetened tea or liverwurst/ jelly sand  s-popcorn 3 c   D-makes own fowl (dilan game hen) New stove in  with froz veg steamed (broccoli or corn or cauliflower) and potatoes (boiled or baked)  Used to eat fast food a lot ([employer] or [employer])  HS-green grapes  Or popcorn     D/C regular pop.  Now drinks unsweetened ice tea    SMBG pattern/BG ranges:today in office 109 mg/dl -2.5 hr post meal of egg with small OJ and greek yogurt and blueberries and granola with raisins and almonds.  FB, 107, 114, 109, 100, 111, 95        Monitoring   Meter (per above goals): Assessed and Discussed  Monitoring: Assessed and Discussed  BG goals: Assessed and Discussed    Nutrition Management  Nutrition Management: Assessed and Discussed  Weight: Assessed and Discussed  Portions/Balance: Assessed and Discussed  Carb ID/Count: Assessed and Discussed  Label Reading: Assessed  Heart Healthy Fats: Needs instruction/review at follow-up  Menu Planning: Needs instruction/review at follow-up  Dining Out: Needs instruction/review at follow-up  Physical Activity: Assessed and Discussed  Medications: Assessed  Orals: Assessed  Injected Medications: Not addressed   Storage/Exp:Not addressed   Site Rotation: Not addressed   Sites  Assessed: no    Diabetes Disease Process: Assessed    Acute Complications: Prevent, Detect, Treat:  Hypoglycemia: Assessed  Hyperglycemia: Assessed  Sick Days: Assessed, Discussed and Literature provided-1/25/17-reviewed 11/8/17.    Driving: Not addressed    Chronic Complications  Foot Care:Assessed  Skin Care: Assessed  Eye: Assessed  ABC: Assessed and Discussed  Teeth:Assessed  Goal Setting and Problem Solving: Needs instruction/review at follow-up  Barriers: Assessed  Psychosocial Adjustments: Assessed      Roxanne De Leon RD, LD, CDE  11/8/2017  9:40 AM

## 2021-06-14 NOTE — PROGRESS NOTES
Pt reports that he has been taking care of his sisters dog and he has been hiking approx 3x daily when he has the dog.  Pt reports that he is really enjoying this. Also going to the gym about 2x weekly.  Keeping record of his meals and weight.  Doesn't eat too many sweet or drink soda.  Pt eats popcorn for snack with a bit garlic powder for flavor.  Pt is also using Mrs Dash for seasoning his food.  Pt did say that he believes he is due for refills on Amlodipine, Lisinopril and Simvastatin, but is not 100% sure.  He asked that a message be sent to PCP about refills.  This has been done.  No other needs at this time.     Next Outreach: 01/22/2018

## 2021-06-16 PROBLEM — N18.30 CHRONIC KIDNEY DISEASE, STAGE III (MODERATE) (H): Status: ACTIVE | Noted: 2019-10-02

## 2021-06-16 NOTE — PROGRESS NOTES
Care Guide Denita, filling in for Alize, called pt and spoke about the following goals: maintaining exercise, maintaining nutrition. Pt shared his personal story with CG regarding weight he used to be, how he adopted proper exercise and nutrition habits and lost weight with the help of medication. Pt shared that he continues to exercise multiple times each week by swimming and he continues to monitor his intake by exercising portion control. CG provided psychosocial support through encouragement and affirmations.     CG reminded pt that he can call Alize RODRIGUEZ to reconnect if he has concerns or questions int he future.     Next outreach: 03/22/18

## 2021-06-17 NOTE — PROGRESS NOTES
Diabetes:   Patient has his eye exam the end of the month.  Last A1c wnl.   Patient states he goes to the gym every day to swim.     Next outreach due: 6/14/18

## 2021-06-17 NOTE — PATIENT INSTRUCTIONS - HE
Patient Instructions by Cody Pacheco MD at 10/2/2019  9:20 AM     Author: Cody Pacheco MD Service: -- Author Type: Physician    Filed: 10/2/2019 12:58 PM Encounter Date: 10/2/2019 Status: Signed    : Cody Pacheco MD (Physician)         Patient Education     Exercise for a Healthier Heart  You may wonder how you can improve the health of your heart. If youre thinking about exercise, youre on the right track. You dont need to become an athlete, but you do need a certain amount of brisk exercise to help strengthen your heart. If you have been diagnosed with a heart condition, your doctor may recommend exercise to help stabilize your condition. To help make exercise a habit, choose safe, fun activities.       Be sure to check with your health care provider before starting an exercise program.    Why exercise?  Exercising regularly offers many healthy rewards. It can help you do all of the following:    Improve your blood cholesterol levels to help prevent further heart trouble    Lower your blood pressure to help prevent a stroke or heart attack    Control diabetes, or reduce your risk of getting this disease    Improve your heart and lung function    Reach and maintain a healthy weight    Make your muscles stronger and more limber so you can stay active    Prevent falls and fractures by slowing the loss of bone mass (osteoporosis)    Manage stress better  Exercise tips  Ease into your routine. Set small goals. Then build on them.  Exercise on most days. Aim for a total of 150 or more minutes of moderate to  vigorous intensity activity each week. Consider 40 minutes, 3 to 4 times a week. For best results, activity should last for 40 minutes on average. It is OK to work up to the 40 minute period over time. Examples of moderate-intensity activity is walking one mile in 15 minutes or 30 to 45 minutes of yard work.  Step up your daily activity level. Along with your exercise program, try  being more active throughout the day. Walk instead of drive. Do more household tasks or yard work.  Choose one or more activities you enjoy. Walking is one of the easiest things you can do. You can also try swimming, riding a bike, or taking an exercise class.  Stop exercising and call your doctor if you:    Have chest pain or feel dizzy or lightheaded    Feel burning, tightness, pressure, or heaviness in your chest, neck, shoulders, back, or arms    Have unusual shortness of breath    Have increased joint or muscle pain    Have palpitations or an irregular heartbeat      [id number] [employer]. [address] [place] [address]. All rights reserved. This information is not intended as a substitute for professional medical care. Always follow your healthcare professional's instructions.         Patient Education   Signs of Hearing Loss  Hearing loss is a problem shared by many people. In fact, it is one of the most common health conditions, particularly as people age. Most people over age 65 have some hearing loss, and by age 80, almost everyone does. Because hearing loss usually occurs slowly over the years, you may not realize your hearing ability has gotten worse.       Have your hearing checked  Contact your [facility] care provider if you:    Have to strain to hear normal conversation.    Have to watch other peoples faces very carefully to follow what theyre saying.    Need to ask people to repeat what theyve said.    Often misunderstand what people are saying.    Turn the volume of the television or radio up so high that others complain.    Feel that people are mumbling when theyre talking to you.    Find that the effort to hear leaves you feeling tired and irritated.    Notice, when using the phone, that you hear better with 1 ear than the other.    [id number] [employer]. [address] [place] [address]. All rights reserved. This information is not intended as a  substitute for professional medical care. Always follow your healthcare professional's instructions.         Patient Education   Understanding Advance Care Planning  Advance care planning is the process of deciding ones own future medical care. It helps ensure that if you cant speak for yourself, your wishes can still be carried out. The plan is a series of legal documents that note a persons wishes. The documents vary by state. Advance care planning may be done when a person has a serious illness that is expected to get worse. It may be done before major surgery. And it can help you and your family be prepared in case of a major illness or injury. Advance care planning helps with making decisions at these times.       A health care proxy is a person who acts as the voice of a patient when the patient cant speak for himself or herself. The name of this role varies by state. It may be called a Durable Medical Power of  or Durable Power of  for Healthcare. It may be called an agent, surrogate, or advocate. Or it may be called a representative or decision maker. It is an official duty that is identified by a legal document. The document also varies by state.    Why Is Advance Care Planning Important?  If a person communicates their healthcare wishes:    They will be given medical care that matches their values and goals.    Their family members will not be forced to make decisions in a crisis with no guidance.  Creating a Plan  Making an advance care plan is often done in 3 steps:    Thinking about ones wishes. To create an advance care plan, you should think about what kind of medical treatment you would want if you lose the ability to communicate. Are there any situations in which you would refuse or stop treatment? Are there therapies you would want or not want? And whom do you want to make decisions for you? There are many places to learn more about how to plan for your care. Ask your doctor or   for resources.    Picking a health care proxy. This means choosing a trusted person to speak for you only when you cant speak for yourself. When you cannot make medical decisions, your proxy makes sure the instructions in your advance care plan are followed. A proxy does not make decisions based on his or her own opinions. They must put aside those opinions and values if needed, and carry out your wishes.    Filling out the legal documents. There are several kinds of legal documents for advance care planning. Each one tells health care providers your wishes. The documents may vary by state. They must be signed and may need to be witnessed or notarized. You can cancel or change them whenever you wish. Depending on your state, the documents may include a Healthcare Proxy form, Living Will, Durable Medical Power of , Advance Directive, or others.  The Familys Role  The best help a family can give is to support their loved ones wishes. Open and honest communication is vital. Family should express any concerns they have about the patients choices while the patient can still make decisions.    [id number] [employer]. [address] [place] [address]. All rights reserved. This information is not intended as a substitute for professional medical care. Always follow your healthcare professional's instructions.         Also, [employer] offers a free, downloadable health care directive that allows you to share your treatment choices and personal preferences if you cannot communicate your wishes. It also allows you to appoint another person (called a health care agent) to make health care decisions if you are unable to do so. You can download an advance directive by going here: [email or web address]     Patient Education   Personalized Prevention Plan  You are due for the preventive services outlined below.  Your care team is available to  assist you in scheduling these services.  If you have already completed any of these items, please share that information with your care team to update in your medical record.  Health Maintenance   Topic Date Due   ? HEPATITIS C SCREENING  1950   ? DIABETES URINE MICROALBUMIN  02/02/1959   ? ZOSTER VACCINES (1 of 2) 02/02/1999   ? MEDICARE ANNUAL WELLNESS VISIT  02/02/2014   ? TD 18+ HE  01/16/2018   ? PNEUMOCOCCAL IMMUNIZATION 65+ LOW/MEDIUM RISK (2 of 2 - PPSV23) 08/15/2018   ? DIABETES HEMOGLOBIN A1C  10/25/2018   ? DIABETES FOLLOW-UP  04/02/2020   ? DIABETES OPHTHALMOLOGY EXAM  05/28/2020   ? DIABETES FOOT EXAM  10/02/2020   ? FALL RISK ASSESSMENT  10/02/2020   ? ADVANCE CARE PLANNING  02/05/2021   ? COLONOSCOPY  06/11/2024

## 2021-06-17 NOTE — PROGRESS NOTES
"  Office Visit - Follow Up   Chemo Casillas   68 y.o. male    Date of Visit: 4/25/2018    Chief Complaint   Patient presents with     Diabetes        Assessment and Plan   1. Essential hypertension  Blood pressures controlled continue current medication  - Basic Metabolic Panel    2. Diabetes mellitus, type 2  Diabetes well controlled, continue metformin, aspirin, statin, annual Medicare exam, excellent diabetic foot care, healthy low carbohydrate diet with modest weight loss  - Glycosylated Hemoglobin A1c    3. Hyperlipidemia, unspecified hyperlipidemia type  Continue statin  - Lipid Cascade    4. Obesity  As above    5. Cognitive impairment  Reviewed Dr. Emigdio Ramirez notes as well as neuropsych assessment, generally no evidence of dementia, somewhat low normal cognitive function    Return in about 6 months (around 10/25/2018) for annual physical.     History of Present Illness   This 68 y.o. old man comes in for follow-up of numerous medical problems.  Overall he is doing okay.  Diabetes is been well controlled.  No lightheadedness dizziness or chest pain.  He did see Dr. Emigdio Ramirez regarding some cognitive impairment.  He had neuropsychological testing which did show low cognitive function but this may be consistent with baseline.  There is no obvious signs of dementia.  He is actually been doing quite well at home.    Review of Systems: A comprehensive review of systems was negative except as noted.     Medications, Allergies and Problem List   Reviewed and updated     Physical Exam   General Appearance:   No acute distress    /68 (Patient Site: Right Arm, Patient Position: Sitting, Cuff Size: Adult Regular)  Pulse 85  Ht 5' 6.5\" (1.689 m)  Wt 195 lb (88.5 kg)  SpO2 97%  BMI 31 kg/m2    HEENT exam is unremarkable  Neck supple no thyromegaly or nodule palpable  Lymphatic no cervical lymphadenopathy  Cardiovascular regular rate and rhythm no murmur gallop or rub  Pulmonary lungs are clear to " auscultation bilaterally  Gastrointestinall abdomen soft nontender nondistended no organomegaly  Neurologic exam is non focal  Psychiatric pleasant, no confusion or agitation        Additional Information   Current Outpatient Prescriptions   Medication Sig Dispense Refill     amLODIPine (NORVASC) 10 MG tablet Take 1 tablet (10 mg total) by mouth daily. 90 tablet 3     aspirin 81 MG EC tablet Take 81 mg by mouth daily.       blood glucose test (CONTOUR NEXT STRIPS) strips Test each day as directed. 30 each 11     generic lancets Test each day as directed. 30 each 11     hydroCHLOROthiazide (HYDRODIURIL) 25 MG tablet TAKE 1 TABLET(25 MG) BY MOUTH DAILY 90 tablet 3     lisinopril (PRINIVIL,ZESTRIL) 20 MG tablet Take 1 tablet (20 mg total) by mouth daily. 90 tablet 3     metFORMIN (GLUCOPHAGE) 1000 MG tablet TAKE 1 TABLET BY MOUTH TWICE DAILY WITH MEALS. 180 tablet 3     simvastatin (ZOCOR) 20 MG tablet Take 1 tablet (20 mg total) by mouth daily. 90 tablet 3     No current facility-administered medications for this visit.      No Known Allergies  Social History   Substance Use Topics     Smoking status: Never Smoker     Smokeless tobacco: Never Used     Alcohol use Yes      Comment: rare       Review and/or order of clinical lab tests:  Review and/or order of radiology tests:  Review and/or order of medicine tests:  Discussion of test results with performing physician:  Decision to obtain old records and/or obtain history from someone other than the patient:  Review and summarization of old records and/or obtaining history from someone other than the patient and.or discussion of case with another health care provider:  Independent visualization of image, tracing or specimen itself:    Time: total time spent with the patient was 25 minutes of which >50% was spent in counseling and coordination of care     Cody Pacheco MD

## 2021-06-17 NOTE — PROGRESS NOTES
Patient continues to meet his goal with fitness and controlling his diabetes.   Request sent to RN to review for maintenance and graduate in 3 months. Patient agrees with this plan.   Patient is due for 6 month diabetes check, care guide scheduled follow up.     Next outreach due: 4/26/18

## 2021-06-17 NOTE — PROGRESS NOTES
Attempt 1-Care Guide called patient.  If this patient is returning our call please transfer to Marti at ext. [phone]  Next outreach due: 5/8/18

## 2021-06-18 NOTE — PROGRESS NOTES
Updated emergency care plan for maintenance.     Diabetes: Sick-Day Plan  Infections, the flu, and even a cold, can cause your blood sugar to rise. And, eating less, nausea, and vomiting may cause your blood glucose to fall (hypoglycemia). Ask your health care provider to help you develop a sick-day plan. The following information can help.    Call your health care provider if:    You vomit or have diarrhea for more than 6 hours.    Your blood glucose level is higher than usual or over 250 mg/dL after you have taken extra insulin (if recommended in your sick-day plan).    You take oral medicine for diabetes, and your blood sugar is higher than usual or over 250 mg/dL, before a meal and stays that high for more than 24 hours.    Your blood glucose is lower than usual or less than 70 mg/dL    You have moderate to large amounts of ketones in your blood or urine.    You aren t better after 2 days.

## 2021-06-18 NOTE — PROGRESS NOTES
This Graduation Wellness Plan provides private information in regards to the work I have done with my Care Team from my Primary Care Clinic.  This document provides insight on the goals I have accomplished.  My Care Team congratulates me on my journey to maintain wellness.  This document will help guide me on my journey to maintain a healthy lifestyle.  I will use this to help me overcome any barriers I may encounter.  If I should have any questions or concerns, I will continue to contact the members of my Care Team or contact my Primary Care Clinic for assistance.        My Clinic Care Coordination Wellness Plan    [facility] Professional [address]  [address]  [address] [place]  [address]  [phone]    My Preferred Method of Contact:  Phone: [phone]    My Primary/Preferred Language:  English    Preferred Learning Style:  Face to face discussion, Pictures/Diagrams and Hands on teaching    Emergency Contact: Extended Emergency Contact Information  Primary Emergency Contact: Suzy Alonso   [facility]  Home Phone: [phone]  Relation: Sister-In-Law     PCP:  Cody Pacheco MD  Specialists:    Care Team            Cody Pacheco MD PCP - General, Internal Medicine    [phone]     Marti [facility] Care Coordination Care Guide, Clinic Care Coordination    [facility] Ph: [phone]          Accomplishments:  Goals       COMPLETED: I have gotten into a healthy routine (pt-stated)            Action steps to achieve this goal    I stay active and attend my medical appointments.  I go to the gym and swim 2-3 times a week.   I eat 3 meals and one snack daily  I set up my medications and take them as directed.               Advanced Directive/Living Will: The patient was given information regarding Adanced Directives/Living Will    Clinical Emergency Plan    Diabetes: Sick-Day Plan  Infections, the flu, and even a  cold, can cause your blood sugar to rise. And, eating less, nausea, and vomiting may cause your blood glucose to fall (hypoglycemia). Ask your health care provider to help you develop a sick-day plan. The following information can help.     Call your health care provider if:    You vomit or have diarrhea for more than 6 hours.    Your blood glucose level is higher than usual or over 250 mg/dL after you have taken extra insulin (if recommended in your sick-day plan).    You take oral medicine for diabetes, and your blood sugar is higher than usual or over 250 mg/dL, before a meal and stays that high for more than 24 hours.    Your blood glucose is lower than usual or less than 70 mg/dL    You have moderate to large amounts of ketones in your blood or urine.    You aren t better after 2 days.       All [facility] clinic patients have access to a Nurse 24 hours a day, 7 days a week.  If you have questions or want advice from a Nurse, please know [facility] is here for you.  You can call your clinic and they will connect you or you can call [facility] at [phone].  [facility] also has Walk In Care clinics in multiple locations.  Call the number listed above for more information about our Walk In Care clinics or visit the [facility] website at [email or web address].

## 2021-06-19 NOTE — LETTER
Letter by Cody Pacheco MD at      Author: Cody Pacheco MD Service: -- Author Type: --    Filed:  Encounter Date: 10/2/2019 Status: Signed         Chemo Casillas  [address]  [place] [address]             October 2, 2019         Dear Mr. Casillas,    Below are the results from your recent visit:    Resulted Orders   HM2(CBC w/o Differential)   Result Value Ref Range    WBC 6.1 4.0 - 11.0 thou/uL    RBC 5.07 4.40 - 6.20 mill/uL    Hemoglobin 15.6 14.0 - 18.0 g/dL    Hematocrit 46.0 40.0 - 54.0 %    MCV 91 80 - 100 fL    MCH 30.7 27.0 - 34.0 pg    MCHC 33.8 32.0 - 36.0 g/dL    RDW 12.5 11.0 - 14.5 %    Platelets 213 140 - 440 thou/uL    MPV 8.8 7.0 - 10.0 fL   Lipid Cascade   Result Value Ref Range    Cholesterol 146 <=199 mg/dL    Triglycerides 192 (H) <=149 mg/dL    HDL Cholesterol 47 >=40 mg/dL    LDL Calculated 61 <=129 mg/dL    Patient Fasting > 8hrs? Yes    Glycosylated Hemoglobin A1c   Result Value Ref Range    Hemoglobin A1c 5.8 3.5 - 6.0 %   Comprehensive Metabolic Panel   Result Value Ref Range    Sodium 142 136 - 145 mmol/L    Potassium 4.3 3.5 - 5.0 mmol/L    Chloride 102 98 - 107 mmol/L    CO2 29 22 - 31 mmol/L    Anion Gap, Calculation 11 5 - 18 mmol/L    Glucose 116 70 - 125 mg/dL    BUN 20 8 - 28 mg/dL    Creatinine 1.01 0.70 - 1.30 mg/dL    GFR MDRD Af Amer >60 >60 mL/min/1.73m2    GFR MDRD Non Af Amer >60 >60 mL/min/1.73m2    Bilirubin, Total 0.4 0.0 - 1.0 mg/dL    Calcium 10.3 8.5 - 10.5 mg/dL    Protein, Total 7.3 6.0 - 8.0 g/dL    Albumin 4.3 3.5 - 5.0 g/dL    Alkaline Phosphatase 40 (L) 45 - 120 U/L    AST 17 0 - 40 U/L    ALT 19 0 - 45 U/L    Narrative    Fasting Glucose reference range is 70-99 mg/dL per  American Diabetes Association (ADA) guidelines.   PSA (Prostatic-Specific Antigen), Annual Screen   Result Value Ref Range    PSA 3.7 0.0 - 6.5 ng/mL    Narrative    Method is Abbott Prostate-Specific Antigen (PSA)  Standard-WHO 1st International (90:10)       Labs  generally look stable, excellent    Please call with questions or contact us using [employer]*t.    Sincerely,        Electronically signed by Cody Pachceo MD

## 2021-06-23 NOTE — TELEPHONE ENCOUNTER
Refill Approved    Rx renewed per Medication Renewal Policy. Medication was last renewed on 12/22/17.    Maritza Dunlap, [facility] Triage/Med Refill 1/25/2019     Requested Prescriptions   Pending Prescriptions Disp Refills     simvastatin (ZOCOR) 20 MG tablet [Pharmacy Med Name: SIMVASTATIN 20MG TABLETS] 90 tablet 0     Sig: TAKE 1 TABLET(20 MG) BY MOUTH DAILY    Statins Refill Protocol (Hmg CoA Reductase Inhibitors) Passed - 1/23/2019 10:30 AM       Passed - PCP or prescribing provider visit in past 12 months     Last office visit with prescriber/PCP: 4/25/2018 Cody Pacheco MD OR same dept: Visit date not found OR same specialty: Visit date not found  Last physical: 2/5/2016 Last MTM visit: Visit date not found   Next visit within 3 mo: Visit date not found  Next physical within 3 mo: Visit date not found  Prescriber OR PCP: Cody Pacheco MD  Last diagnosis associated with med order: 1. Diabetes mellitus, type 2 (H)  - simvastatin (ZOCOR) 20 MG tablet [Pharmacy Med Name: SIMVASTATIN 20MG TABLETS]; TAKE 1 TABLET(20 MG) BY MOUTH DAILY  Dispense: 90 tablet; Refill: 0    2. Essential hypertension with goal blood pressure less than 140/90  - simvastatin (ZOCOR) 20 MG tablet [Pharmacy Med Name: SIMVASTATIN 20MG TABLETS]; TAKE 1 TABLET(20 MG) BY MOUTH DAILY  Dispense: 90 tablet; Refill: 0    3. Hyperlipidemia, unspecified hyperlipidemia type  - simvastatin (ZOCOR) 20 MG tablet [Pharmacy Med Name: SIMVASTATIN 20MG TABLETS]; TAKE 1 TABLET(20 MG) BY MOUTH DAILY  Dispense: 90 tablet; Refill: 0    4. Obesity  - simvastatin (ZOCOR) 20 MG tablet [Pharmacy Med Name: SIMVASTATIN 20MG TABLETS]; TAKE 1 TABLET(20 MG) BY MOUTH DAILY  Dispense: 90 tablet; Refill: 0    If protocol passes may refill for 12 months if within 3 months of last provider visit (or a total of 15 months).

## 2021-06-24 NOTE — TELEPHONE ENCOUNTER
Refill Approved    Rx renewed per Medication Renewal Policy. Medication was last renewed on 12/7/18.    Angelia Carnes, [facility] Triage/Med Refill 2/26/2019     Requested Prescriptions   Pending Prescriptions Disp Refills     amLODIPine (NORVASC) 10 MG tablet [Pharmacy Med Name: AMLODIPINE BESYLATE 10MG TABLETS] 90 tablet 0     Sig: TAKE 1 TABLET(10 MG) BY MOUTH DAILY    Calcium-Channel Blockers Protocol Passed - 2/26/2019 11:02 AM       Passed - PCP or prescribing provider visit in past 12 months or next 3 months    Last office visit with prescriber/PCP: 4/25/2018 Cody Pacheco MD OR same dept: Visit date not found OR same specialty: Visit date not found  Last physical: 2/5/2016 Last MTM visit: Visit date not found   Next visit within 3 mo: Visit date not found  Next physical within 3 mo: Visit date not found  Prescriber OR PCP: Cody Pacheco MD  Last diagnosis associated with med order: 1. Essential hypertension with goal blood pressure less than 140/90  - amLODIPine (NORVASC) 10 MG tablet [Pharmacy Med Name: AMLODIPINE BESYLATE 10MG TABLETS]; TAKE 1 TABLET(10 MG) BY MOUTH DAILY  Dispense: 90 tablet; Refill: 0  - lisinopril (PRINIVIL,ZESTRIL) 20 MG tablet [Pharmacy Med Name: LISINOPRIL 20MG TABLETS]; TAKE 1 TABLET(20 MG) BY MOUTH DAILY  Dispense: 90 tablet; Refill: 0    2. Diabetes mellitus, type 2 (H)  - lisinopril (PRINIVIL,ZESTRIL) 20 MG tablet [Pharmacy Med Name: LISINOPRIL 20MG TABLETS]; TAKE 1 TABLET(20 MG) BY MOUTH DAILY  Dispense: 90 tablet; Refill: 0  - metFORMIN (GLUCOPHAGE) 1000 MG tablet [Pharmacy Med Name: METFORMIN 1000MG TABLETS]; TAKE 1 TABLET BY MOUTH TWICE DAILY WITH MEALS.  Dispense: 180 tablet; Refill: 0    3. Hyperlipidemia, unspecified hyperlipidemia type  - lisinopril (PRINIVIL,ZESTRIL) 20 MG tablet [Pharmacy Med Name: LISINOPRIL 20MG TABLETS]; TAKE 1 TABLET(20 MG) BY MOUTH DAILY  Dispense: 90 tablet; Refill: 0    4. Obesity  - lisinopril (PRINIVIL,ZESTRIL) 20 MG tablet  [Pharmacy Med Name: LISINOPRIL 20MG TABLETS]; TAKE 1 TABLET(20 MG) BY MOUTH DAILY  Dispense: 90 tablet; Refill: 0    5. Hypertension  - hydroCHLOROthiazide (HYDRODIURIL) 25 MG tablet [Pharmacy Med Name: HYDROCHLOROTHIAZIDE 25MG TABLETS]; TAKE 1 TABLET(25 MG) BY MOUTH DAILY  Dispense: 90 tablet; Refill: 0    If protocol passes may refill for 12 months if within 3 months of last provider visit (or a total of 15 months).            Passed - Blood pressure filed in past 12 months    BP Readings from Last 1 Encounters:   04/25/18 116/68             lisinopril (PRINIVIL,ZESTRIL) 20 MG tablet [Pharmacy Med Name: LISINOPRIL 20MG TABLETS] 90 tablet 0     Sig: TAKE 1 TABLET(20 MG) BY MOUTH DAILY    Ace Inhibitors Refill Protocol Passed - 2/26/2019 11:02 AM       Passed - PCP or prescribing provider visit in past 12 months      Last office visit with prescriber/PCP: 4/25/2018 Cody Pacheco MD OR same dept: Visit date not found OR same specialty: Visit date not found  Last physical: 2/5/2016 Last MTM visit: Visit date not found   Next visit within 3 mo: Visit date not found  Next physical within 3 mo: Visit date not found  Prescriber OR PCP: Cody Pacheco MD  Last diagnosis associated with med order: 1. Essential hypertension with goal blood pressure less than 140/90  - amLODIPine (NORVASC) 10 MG tablet [Pharmacy Med Name: AMLODIPINE BESYLATE 10MG TABLETS]; TAKE 1 TABLET(10 MG) BY MOUTH DAILY  Dispense: 90 tablet; Refill: 0  - lisinopril (PRINIVIL,ZESTRIL) 20 MG tablet [Pharmacy Med Name: LISINOPRIL 20MG TABLETS]; TAKE 1 TABLET(20 MG) BY MOUTH DAILY  Dispense: 90 tablet; Refill: 0    2. Diabetes mellitus, type 2 (H)  - lisinopril (PRINIVIL,ZESTRIL) 20 MG tablet [Pharmacy Med Name: LISINOPRIL 20MG TABLETS]; TAKE 1 TABLET(20 MG) BY MOUTH DAILY  Dispense: 90 tablet; Refill: 0  - metFORMIN (GLUCOPHAGE) 1000 MG tablet [Pharmacy Med Name: METFORMIN 1000MG TABLETS]; TAKE 1 TABLET BY MOUTH TWICE DAILY WITH MEALS.   Dispense: 180 tablet; Refill: 0    3. Hyperlipidemia, unspecified hyperlipidemia type  - lisinopril (PRINIVIL,ZESTRIL) 20 MG tablet [Pharmacy Med Name: LISINOPRIL 20MG TABLETS]; TAKE 1 TABLET(20 MG) BY MOUTH DAILY  Dispense: 90 tablet; Refill: 0    4. Obesity  - lisinopril (PRINIVIL,ZESTRIL) 20 MG tablet [Pharmacy Med Name: LISINOPRIL 20MG TABLETS]; TAKE 1 TABLET(20 MG) BY MOUTH DAILY  Dispense: 90 tablet; Refill: 0    5. Hypertension  - hydroCHLOROthiazide (HYDRODIURIL) 25 MG tablet [Pharmacy Med Name: HYDROCHLOROTHIAZIDE 25MG TABLETS]; TAKE 1 TABLET(25 MG) BY MOUTH DAILY  Dispense: 90 tablet; Refill: 0    If protocol passes may refill for 12 months if within 3 months of last provider visit (or a total of 15 months).            Passed - Serum Potassium in past 12 months    Lab Results   Component Value Date    Potassium 3.7 04/25/2018            Passed - Blood pressure filed in past 12 months    BP Readings from Last 1 Encounters:   04/25/18 116/68            Passed - Serum Creatinine in past 12 months    Creatinine   Date Value Ref Range Status   04/25/2018 1.04 0.70 - 1.30 mg/dL Final             hydroCHLOROthiazide (HYDRODIURIL) 25 MG tablet [Pharmacy Med Name: HYDROCHLOROTHIAZIDE 25MG TABLETS] 90 tablet 0     Sig: TAKE 1 TABLET(25 MG) BY MOUTH DAILY    Diuretics/Combination Diuretics Refill Protocol  Passed - 2/26/2019 11:02 AM       Passed - Visit with PCP or prescribing provider visit in past 12 months    Last office visit with prescriber/PCP: 4/25/2018 Cody Pacheco MD OR same dept: Visit date not found OR same specialty: Visit date not found  Last physical: 2/5/2016 Last MTM visit: Visit date not found   Next visit within 3 mo: Visit date not found  Next physical within 3 mo: Visit date not found  Prescriber OR PCP: Cody Pacheco MD  Last diagnosis associated with med order: 1. Essential hypertension with goal blood pressure less than 140/90  - amLODIPine (NORVASC) 10 MG tablet [Pharmacy Med  Name: AMLODIPINE BESYLATE 10MG TABLETS]; TAKE 1 TABLET(10 MG) BY MOUTH DAILY  Dispense: 90 tablet; Refill: 0  - lisinopril (PRINIVIL,ZESTRIL) 20 MG tablet [Pharmacy Med Name: LISINOPRIL 20MG TABLETS]; TAKE 1 TABLET(20 MG) BY MOUTH DAILY  Dispense: 90 tablet; Refill: 0    2. Diabetes mellitus, type 2 (H)  - lisinopril (PRINIVIL,ZESTRIL) 20 MG tablet [Pharmacy Med Name: LISINOPRIL 20MG TABLETS]; TAKE 1 TABLET(20 MG) BY MOUTH DAILY  Dispense: 90 tablet; Refill: 0  - metFORMIN (GLUCOPHAGE) 1000 MG tablet [Pharmacy Med Name: METFORMIN 1000MG TABLETS]; TAKE 1 TABLET BY MOUTH TWICE DAILY WITH MEALS.  Dispense: 180 tablet; Refill: 0    3. Hyperlipidemia, unspecified hyperlipidemia type  - lisinopril (PRINIVIL,ZESTRIL) 20 MG tablet [Pharmacy Med Name: LISINOPRIL 20MG TABLETS]; TAKE 1 TABLET(20 MG) BY MOUTH DAILY  Dispense: 90 tablet; Refill: 0    4. Obesity  - lisinopril (PRINIVIL,ZESTRIL) 20 MG tablet [Pharmacy Med Name: LISINOPRIL 20MG TABLETS]; TAKE 1 TABLET(20 MG) BY MOUTH DAILY  Dispense: 90 tablet; Refill: 0    5. Hypertension  - hydroCHLOROthiazide (HYDRODIURIL) 25 MG tablet [Pharmacy Med Name: HYDROCHLOROTHIAZIDE 25MG TABLETS]; TAKE 1 TABLET(25 MG) BY MOUTH DAILY  Dispense: 90 tablet; Refill: 0    If protocol passes may refill for 12 months if within 3 months of last provider visit (or a total of 15 months).            Passed - Serum Potassium in past 12 months     Lab Results   Component Value Date    Potassium 3.7 04/25/2018            Passed - Serum Sodium in past 12 months     Lab Results   Component Value Date    Sodium 139 04/25/2018            Passed - Blood pressure on file in past 12 months    BP Readings from Last 1 Encounters:   04/25/18 116/68            Passed - Serum Creatinine in past 12 months     Creatinine   Date Value Ref Range Status   04/25/2018 1.04 0.70 - 1.30 mg/dL Final             metFORMIN (GLUCOPHAGE) 1000 MG tablet [Pharmacy Med Name: METFORMIN 1000MG TABLETS] 180 tablet 0     Sig: TAKE 1  TABLET BY MOUTH TWICE DAILY WITH MEALS.    Metformin Refill Protocol Failed - 2/26/2019 11:02 AM       Failed - LFT or AST or ALT in last 12 months    Albumin   Date Value Ref Range Status   08/15/2017 4.0 3.5 - 5.0 g/dL Final     Bilirubin, Total   Date Value Ref Range Status   08/15/2017 0.4 0.0 - 1.0 mg/dL Final     Alkaline Phosphatase   Date Value Ref Range Status   08/15/2017 31 (L) 45 - 120 U/L Final     AST   Date Value Ref Range Status   08/15/2017 12 0 - 40 U/L Final     ALT   Date Value Ref Range Status   08/15/2017 17 0 - 45 U/L Final     Protein, Total   Date Value Ref Range Status   08/15/2017 7.2 6.0 - 8.0 g/dL Final               Failed - Visit with PCP or prescribing provider visit in last 6 months or next 3 months    Last office visit with prescriber/PCP: Visit date not found OR same dept: Visit date not found OR same specialty: Visit date not found Last physical: Visit date not found Last MTM visit: Visit date not found         Next appt within 3 mo: Visit date not found  Next physical within 3 mo: Visit date not found  Prescriber OR PCP: Cody Pacheco MD  Last diagnosis associated with med order: 1. Essential hypertension with goal blood pressure less than 140/90  - amLODIPine (NORVASC) 10 MG tablet [Pharmacy Med Name: AMLODIPINE BESYLATE 10MG TABLETS]; TAKE 1 TABLET(10 MG) BY MOUTH DAILY  Dispense: 90 tablet; Refill: 0  - lisinopril (PRINIVIL,ZESTRIL) 20 MG tablet [Pharmacy Med Name: LISINOPRIL 20MG TABLETS]; TAKE 1 TABLET(20 MG) BY MOUTH DAILY  Dispense: 90 tablet; Refill: 0    2. Diabetes mellitus, type 2 (H)  - lisinopril (PRINIVIL,ZESTRIL) 20 MG tablet [Pharmacy Med Name: LISINOPRIL 20MG TABLETS]; TAKE 1 TABLET(20 MG) BY MOUTH DAILY  Dispense: 90 tablet; Refill: 0  - metFORMIN (GLUCOPHAGE) 1000 MG tablet [Pharmacy Med Name: METFORMIN 1000MG TABLETS]; TAKE 1 TABLET BY MOUTH TWICE DAILY WITH MEALS.  Dispense: 180 tablet; Refill: 0    3. Hyperlipidemia, unspecified hyperlipidemia type  -  lisinopril (PRINIVIL,ZESTRIL) 20 MG tablet [Pharmacy Med Name: LISINOPRIL 20MG TABLETS]; TAKE 1 TABLET(20 MG) BY MOUTH DAILY  Dispense: 90 tablet; Refill: 0    4. Obesity  - lisinopril (PRINIVIL,ZESTRIL) 20 MG tablet [Pharmacy Med Name: LISINOPRIL 20MG TABLETS]; TAKE 1 TABLET(20 MG) BY MOUTH DAILY  Dispense: 90 tablet; Refill: 0    5. Hypertension  - hydroCHLOROthiazide (HYDRODIURIL) 25 MG tablet [Pharmacy Med Name: HYDROCHLOROTHIAZIDE 25MG TABLETS]; TAKE 1 TABLET(25 MG) BY MOUTH DAILY  Dispense: 90 tablet; Refill: 0     If protocol passes may refill for 12 months if within 3 months of last provider visit (or a total of 15 months).          Failed - A1C in last 6 months    Hemoglobin A1c   Date Value Ref Range Status   04/25/2018 5.7 3.5 - 6.0 % Final              Failed - Microalbumin in last year     No results found for: MICROALBUR              Passed - Blood pressure in last 12 months    BP Readings from Last 1 Encounters:   04/25/18 116/68            Passed - GFR or Serum Creatinine in last 6 months    GFR MDRD Non Af Amer   Date Value Ref Range Status   04/25/2018 >60 >60 mL/min/1.73m2 Final     GFR MDRD Af Amer   Date Value Ref Range Status   04/25/2018 >60 >60 mL/min/1.73m2 Final

## 2021-06-24 NOTE — TELEPHONE ENCOUNTER
RN cannot approve Refill Request    RN can NOT refill this medication Protocol failed and NO refill given.       Angelia Carnes, [facility] Triage/Med Refill 2/26/2019    Requested Prescriptions   Pending Prescriptions Disp Refills     metFORMIN (GLUCOPHAGE) 1000 MG tablet [Pharmacy Med Name: METFORMIN 1000MG TABLETS] 180 tablet 0     Sig: TAKE 1 TABLET BY MOUTH TWICE DAILY WITH MEALS.    Metformin Refill Protocol Failed - 2/26/2019 11:02 AM       Failed - LFT or AST or ALT in last 12 months    Albumin   Date Value Ref Range Status   08/15/2017 4.0 3.5 - 5.0 g/dL Final     Bilirubin, Total   Date Value Ref Range Status   08/15/2017 0.4 0.0 - 1.0 mg/dL Final     Alkaline Phosphatase   Date Value Ref Range Status   08/15/2017 31 (L) 45 - 120 U/L Final     AST   Date Value Ref Range Status   08/15/2017 12 0 - 40 U/L Final     ALT   Date Value Ref Range Status   08/15/2017 17 0 - 45 U/L Final     Protein, Total   Date Value Ref Range Status   08/15/2017 7.2 6.0 - 8.0 g/dL Final               Failed - Visit with PCP or prescribing provider visit in last 6 months or next 3 months    Last office visit with prescriber/PCP: Visit date not found OR same dept: Visit date not found OR same specialty: Visit date not found Last physical: Visit date not found Last MTM visit: Visit date not found         Next appt within 3 mo: Visit date not found  Next physical within 3 mo: Visit date not found  Prescriber OR PCP: Cody Pacheco MD  Last diagnosis associated with med order: 1. Essential hypertension with goal blood pressure less than 140/90  - amLODIPine (NORVASC) 10 MG tablet; TAKE 1 TABLET(10 MG) BY MOUTH DAILY  Dispense: 90 tablet; Refill: 0  - lisinopril (PRINIVIL,ZESTRIL) 20 MG tablet; TAKE 1 TABLET(20 MG) BY MOUTH DAILY  Dispense: 90 tablet; Refill: 0    2. Diabetes mellitus, type 2 (H)  - lisinopril (PRINIVIL,ZESTRIL) 20 MG tablet; TAKE 1 TABLET(20 MG) BY MOUTH DAILY  Dispense: 90 tablet; Refill: 0  - metFORMIN  (GLUCOPHAGE) 1000 MG tablet [Pharmacy Med Name: METFORMIN 1000MG TABLETS]; TAKE 1 TABLET BY MOUTH TWICE DAILY WITH MEALS.  Dispense: 180 tablet; Refill: 0    3. Hyperlipidemia, unspecified hyperlipidemia type  - lisinopril (PRINIVIL,ZESTRIL) 20 MG tablet; TAKE 1 TABLET(20 MG) BY MOUTH DAILY  Dispense: 90 tablet; Refill: 0    4. Obesity  - lisinopril (PRINIVIL,ZESTRIL) 20 MG tablet; TAKE 1 TABLET(20 MG) BY MOUTH DAILY  Dispense: 90 tablet; Refill: 0    5. Hypertension  - hydroCHLOROthiazide (HYDRODIURIL) 25 MG tablet; TAKE 1 TABLET(25 MG) BY MOUTH DAILY  Dispense: 90 tablet; Refill: 0     If protocol passes may refill for 12 months if within 3 months of last provider visit (or a total of 15 months).          Failed - A1C in last 6 months    Hemoglobin A1c   Date Value Ref Range Status   04/25/2018 5.7 3.5 - 6.0 % Final              Failed - Microalbumin in last year     No results found for: MICROALBUR              Passed - Blood pressure in last 12 months    BP Readings from Last 1 Encounters:   04/25/18 116/68            Passed - GFR or Serum Creatinine in last 6 months    GFR MDRD Non Af Amer   Date Value Ref Range Status   04/25/2018 >60 >60 mL/min/1.73m2 Final     GFR MDRD Af Amer   Date Value Ref Range Status   04/25/2018 >60 >60 mL/min/1.73m2 Final           Signed Prescriptions Disp Refills     amLODIPine (NORVASC) 10 MG tablet 90 tablet 0     Sig: TAKE 1 TABLET(10 MG) BY MOUTH DAILY    Calcium-Channel Blockers Protocol Passed - 2/26/2019 11:02 AM       Passed - PCP or prescribing provider visit in past 12 months or next 3 months    Last office visit with prescriber/PCP: 4/25/2018 Cody Pacheco MD OR same dept: Visit date not found OR same specialty: Visit date not found  Last physical: 2/5/2016 Last MTM visit: Visit date not found   Next visit within 3 mo: Visit date not found  Next physical within 3 mo: Visit date not found  Prescriber OR PCP: Cody Pacheco MD  Last diagnosis associated with  med order: 1. Essential hypertension with goal blood pressure less than 140/90  - amLODIPine (NORVASC) 10 MG tablet; TAKE 1 TABLET(10 MG) BY MOUTH DAILY  Dispense: 90 tablet; Refill: 0  - lisinopril (PRINIVIL,ZESTRIL) 20 MG tablet; TAKE 1 TABLET(20 MG) BY MOUTH DAILY  Dispense: 90 tablet; Refill: 0    2. Diabetes mellitus, type 2 (H)  - lisinopril (PRINIVIL,ZESTRIL) 20 MG tablet; TAKE 1 TABLET(20 MG) BY MOUTH DAILY  Dispense: 90 tablet; Refill: 0  - metFORMIN (GLUCOPHAGE) 1000 MG tablet [Pharmacy Med Name: METFORMIN 1000MG TABLETS]; TAKE 1 TABLET BY MOUTH TWICE DAILY WITH MEALS.  Dispense: 180 tablet; Refill: 0    3. Hyperlipidemia, unspecified hyperlipidemia type  - lisinopril (PRINIVIL,ZESTRIL) 20 MG tablet; TAKE 1 TABLET(20 MG) BY MOUTH DAILY  Dispense: 90 tablet; Refill: 0    4. Obesity  - lisinopril (PRINIVIL,ZESTRIL) 20 MG tablet; TAKE 1 TABLET(20 MG) BY MOUTH DAILY  Dispense: 90 tablet; Refill: 0    5. Hypertension  - hydroCHLOROthiazide (HYDRODIURIL) 25 MG tablet; TAKE 1 TABLET(25 MG) BY MOUTH DAILY  Dispense: 90 tablet; Refill: 0    If protocol passes may refill for 12 months if within 3 months of last provider visit (or a total of 15 months).            Passed - Blood pressure filed in past 12 months    BP Readings from Last 1 Encounters:   04/25/18 116/68             lisinopril (PRINIVIL,ZESTRIL) 20 MG tablet 90 tablet 0     Sig: TAKE 1 TABLET(20 MG) BY MOUTH DAILY    Ace Inhibitors Refill Protocol Passed - 2/26/2019 11:02 AM       Passed - PCP or prescribing provider visit in past 12 months      Last office visit with prescriber/PCP: 4/25/2018 Cody Pacheco MD OR same dept: Visit date not found OR same specialty: Visit date not found  Last physical: 2/5/2016 Last MTM visit: Visit date not found   Next visit within 3 mo: Visit date not found  Next physical within 3 mo: Visit date not found  Prescriber OR PCP: Cody Pacheco MD  Last diagnosis associated with med order: 1. Essential  hypertension with goal blood pressure less than 140/90  - amLODIPine (NORVASC) 10 MG tablet; TAKE 1 TABLET(10 MG) BY MOUTH DAILY  Dispense: 90 tablet; Refill: 0  - lisinopril (PRINIVIL,ZESTRIL) 20 MG tablet; TAKE 1 TABLET(20 MG) BY MOUTH DAILY  Dispense: 90 tablet; Refill: 0    2. Diabetes mellitus, type 2 (H)  - lisinopril (PRINIVIL,ZESTRIL) 20 MG tablet; TAKE 1 TABLET(20 MG) BY MOUTH DAILY  Dispense: 90 tablet; Refill: 0  - metFORMIN (GLUCOPHAGE) 1000 MG tablet [Pharmacy Med Name: METFORMIN 1000MG TABLETS]; TAKE 1 TABLET BY MOUTH TWICE DAILY WITH MEALS.  Dispense: 180 tablet; Refill: 0    3. Hyperlipidemia, unspecified hyperlipidemia type  - lisinopril (PRINIVIL,ZESTRIL) 20 MG tablet; TAKE 1 TABLET(20 MG) BY MOUTH DAILY  Dispense: 90 tablet; Refill: 0    4. Obesity  - lisinopril (PRINIVIL,ZESTRIL) 20 MG tablet; TAKE 1 TABLET(20 MG) BY MOUTH DAILY  Dispense: 90 tablet; Refill: 0    5. Hypertension  - hydroCHLOROthiazide (HYDRODIURIL) 25 MG tablet; TAKE 1 TABLET(25 MG) BY MOUTH DAILY  Dispense: 90 tablet; Refill: 0    If protocol passes may refill for 12 months if within 3 months of last provider visit (or a total of 15 months).            Passed - Serum Potassium in past 12 months    Lab Results   Component Value Date    Potassium 3.7 04/25/2018            Passed - Blood pressure filed in past 12 months    BP Readings from Last 1 Encounters:   04/25/18 116/68            Passed - Serum Creatinine in past 12 months    Creatinine   Date Value Ref Range Status   04/25/2018 1.04 0.70 - 1.30 mg/dL Final             hydroCHLOROthiazide (HYDRODIURIL) 25 MG tablet 90 tablet 0     Sig: TAKE 1 TABLET(25 MG) BY MOUTH DAILY    Diuretics/Combination Diuretics Refill Protocol  Passed - 2/26/2019 11:02 AM       Passed - Visit with PCP or prescribing provider visit in past 12 months    Last office visit with prescriber/PCP: 4/25/2018 Cody Pacheco MD OR same dept: Visit date not found OR same specialty: Visit date not found   Last physical: 2/5/2016 Last MTM visit: Visit date not found   Next visit within 3 mo: Visit date not found  Next physical within 3 mo: Visit date not found  Prescriber OR PCP: Cody Pacheco MD  Last diagnosis associated with med order: 1. Essential hypertension with goal blood pressure less than 140/90  - amLODIPine (NORVASC) 10 MG tablet; TAKE 1 TABLET(10 MG) BY MOUTH DAILY  Dispense: 90 tablet; Refill: 0  - lisinopril (PRINIVIL,ZESTRIL) 20 MG tablet; TAKE 1 TABLET(20 MG) BY MOUTH DAILY  Dispense: 90 tablet; Refill: 0    2. Diabetes mellitus, type 2 (H)  - lisinopril (PRINIVIL,ZESTRIL) 20 MG tablet; TAKE 1 TABLET(20 MG) BY MOUTH DAILY  Dispense: 90 tablet; Refill: 0  - metFORMIN (GLUCOPHAGE) 1000 MG tablet [Pharmacy Med Name: METFORMIN 1000MG TABLETS]; TAKE 1 TABLET BY MOUTH TWICE DAILY WITH MEALS.  Dispense: 180 tablet; Refill: 0    3. Hyperlipidemia, unspecified hyperlipidemia type  - lisinopril (PRINIVIL,ZESTRIL) 20 MG tablet; TAKE 1 TABLET(20 MG) BY MOUTH DAILY  Dispense: 90 tablet; Refill: 0    4. Obesity  - lisinopril (PRINIVIL,ZESTRIL) 20 MG tablet; TAKE 1 TABLET(20 MG) BY MOUTH DAILY  Dispense: 90 tablet; Refill: 0    5. Hypertension  - hydroCHLOROthiazide (HYDRODIURIL) 25 MG tablet; TAKE 1 TABLET(25 MG) BY MOUTH DAILY  Dispense: 90 tablet; Refill: 0    If protocol passes may refill for 12 months if within 3 months of last provider visit (or a total of 15 months).            Passed - Serum Potassium in past 12 months     Lab Results   Component Value Date    Potassium 3.7 04/25/2018            Passed - Serum Sodium in past 12 months     Lab Results   Component Value Date    Sodium 139 04/25/2018            Passed - Blood pressure on file in past 12 months    BP Readings from Last 1 Encounters:   04/25/18 116/68            Passed - Serum Creatinine in past 12 months     Creatinine   Date Value Ref Range Status   04/25/2018 1.04 0.70 - 1.30 mg/dL Final

## 2021-08-02 DIAGNOSIS — E11.9 TYPE 2 DIABETES MELLITUS WITHOUT COMPLICATION, WITHOUT LONG-TERM CURRENT USE OF INSULIN (H): Primary | ICD-10-CM

## 2021-08-02 DIAGNOSIS — E66.9 OBESITY: ICD-10-CM

## 2021-08-02 DIAGNOSIS — E11.9 DIABETES MELLITUS, TYPE 2 (H): ICD-10-CM

## 2021-08-02 DIAGNOSIS — E78.5 HYPERLIPIDEMIA, UNSPECIFIED HYPERLIPIDEMIA TYPE: ICD-10-CM

## 2021-08-02 DIAGNOSIS — I10 ESSENTIAL HYPERTENSION WITH GOAL BLOOD PRESSURE LESS THAN 140/90: ICD-10-CM

## 2021-08-03 DIAGNOSIS — I10 ESSENTIAL HYPERTENSION WITH GOAL BLOOD PRESSURE LESS THAN 140/90: Primary | ICD-10-CM

## 2021-08-03 DIAGNOSIS — I10 HYPERTENSION: ICD-10-CM

## 2021-08-05 NOTE — TELEPHONE ENCOUNTER
"Routing refill request to provider for review/approval because:  Labs not current:  Multiple   Visit greater than one year ago    Last Written Prescription Date:  6/2/21  Last Fill Quantity: 30/60,  # refills: 1   Last office visit provider:  10/2/19     Requested Prescriptions   Pending Prescriptions Disp Refills     amLODIPine (NORVASC) 10 MG tablet [Pharmacy Med Name: AMLODIPINE BESYLATE 10MG TABLETS] 90 tablet      Sig: TAKE 1 TABLET(10 MG) BY MOUTH DAILY       Calcium Channel Blockers Protocol  Failed - 8/2/2021 11:05 AM        Failed - Blood pressure under 140/90 in past 12 months     BP Readings from Last 3 Encounters:   10/02/19 128/78                 Failed - Recent (12 mo) or future (30 days) visit within the authorizing provider's specialty     Patient has had an office visit with the authorizing provider or a provider within the authorizing providers department within the previous 12 mos or has a future within next 30 days. See \"Patient Info\" tab in inbasket, or \"Choose Columns\" in Meds & Orders section of the refill encounter.              Failed - Normal serum creatinine on file in past 12 months     Recent Labs   Lab Test 10/02/19  1009   CR 1.01       Ok to refill medication if creatinine is low          Passed - Medication is active on med list        Passed - Patient is age 18 or older           metFORMIN (GLUCOPHAGE) 1000 MG tablet [Pharmacy Med Name: METFORMIN 1000MG TABLETS] 180 tablet      Sig: TAKE 1 TABLET(1000 MG) BY MOUTH TWICE DAILY WITH MEALS       Biguanide Agents Failed - 8/2/2021 11:05 AM        Failed - Patient has documented A1c within the specified period of time.     If HgbA1C is 8 or greater, it needs to be on file within the past 3 months.  If less than 8, must be on file within the past 6 months.     Recent Labs   Lab Test 10/02/19  1009   A1C 5.8             Failed - Patient's CR is NOT>1.4 OR Patient's EGFR is NOT<45 within past 12 mos.     Recent Labs   Lab Test " "10/02/19  1009   GFRESTIMATED >60   GFRESTBLACK >60       Recent Labs   Lab Test 10/02/19  1009   CR 1.01             Failed - Recent (6 mo) or future (30 days) visit within the authorizing provider's specialty     Patient had office visit in the last 6 months or has a visit in the next 30 days with authorizing provider or within the authorizing provider's specialty.  See \"Patient Info\" tab in inbasket, or \"Choose Columns\" in Meds & Orders section of the refill encounter.            Passed - Patient is age 10 or older        Passed - Patient does NOT have a diagnosis of CHF.        Passed - Medication is active on med list           lisinopril (ZESTRIL) 20 MG tablet [Pharmacy Med Name: LISINOPRIL 20MG TABLETS] 90 tablet      Sig: TAKE 1 TABLET(20 MG) BY MOUTH DAILY       ACE Inhibitors (Including Combos) Protocol Failed - 8/2/2021 11:05 AM        Failed - Blood pressure under 140/90 in past 12 months     BP Readings from Last 3 Encounters:   10/02/19 128/78                 Failed - Recent (12 mo) or future (30 days) visit within the authorizing provider's specialty     Patient has had an office visit with the authorizing provider or a provider within the authorizing providers department within the previous 12 mos or has a future within next 30 days. See \"Patient Info\" tab in inbasket, or \"Choose Columns\" in Meds & Orders section of the refill encounter.              Failed - Normal serum creatinine on file in past 12 months     Recent Labs   Lab Test 10/02/19  1009   CR 1.01       Ok to refill medication if creatinine is low          Failed - Normal serum potassium on file in past 12 months     Recent Labs   Lab Test 10/02/19  1009   POTASSIUM 4.3             Passed - Medication is active on med list        Passed - Patient is age 18 or older             Ronnie Sorto RN 08/05/21 12:43 PM  "

## 2021-08-07 NOTE — TELEPHONE ENCOUNTER
"Routing refill request to provider for review/approval because:  Patient needs to be seen because it has been more than 1 year since last office visit.    Last Written Prescription Date:  6/2/21  Last Fill Quantity: 30,  # refills: 1  Last office visit provider: 10/2/19    Requested Prescriptions   Pending Prescriptions Disp Refills     hydrochlorothiazide (HYDRODIURIL) 25 MG tablet [Pharmacy Med Name: HYDROCHLOROTHIAZIDE 25MG TABLETS] 90 tablet      Sig: TAKE 1 TABLET(25 MG) BY MOUTH DAILY       Diuretics (Including Combos) Protocol Failed - 8/3/2021  1:51 PM        Failed - Blood pressure under 140/90 in past 12 months     BP Readings from Last 3 Encounters:   10/02/19 128/78                 Failed - Recent (12 mo) or future (30 days) visit within the authorizing provider's specialty     Patient has had an office visit with the authorizing provider or a provider within the authorizing providers department within the previous 12 mos or has a future within next 30 days. See \"Patient Info\" tab in inbasket, or \"Choose Columns\" in Meds & Orders section of the refill encounter.              Failed - Normal serum creatinine on file in past 12 months     Recent Labs   Lab Test 10/02/19  1009   CR 1.01              Failed - Normal serum potassium on file in past 12 months     Recent Labs   Lab Test 10/02/19  1009   POTASSIUM 4.3                    Failed - Normal serum sodium on file in past 12 months     Recent Labs   Lab Test 10/02/19  1009                 Passed - Medication is active on med list        Passed - Patient is age 18 or older             Tracy Tineo RN 08/06/21 8:16 PM  "

## 2021-08-08 RX ORDER — AMLODIPINE BESYLATE 10 MG/1
TABLET ORAL
Qty: 90 TABLET | Refills: 3 | Status: SHIPPED | OUTPATIENT
Start: 2021-08-08 | End: 2022-03-25

## 2021-08-08 RX ORDER — LISINOPRIL 20 MG/1
TABLET ORAL
Qty: 90 TABLET | Refills: 3 | Status: SHIPPED | OUTPATIENT
Start: 2021-08-08 | End: 2022-03-25

## 2021-08-08 RX ORDER — HYDROCHLOROTHIAZIDE 25 MG/1
TABLET ORAL
Qty: 90 TABLET | Refills: 3 | Status: SHIPPED | OUTPATIENT
Start: 2021-08-08 | End: 2022-03-25

## 2021-10-18 DIAGNOSIS — I10 ESSENTIAL HYPERTENSION WITH GOAL BLOOD PRESSURE LESS THAN 140/90: ICD-10-CM

## 2021-10-18 DIAGNOSIS — E66.9 OBESITY: ICD-10-CM

## 2021-10-18 DIAGNOSIS — E11.9 DIABETES MELLITUS, TYPE 2 (H): ICD-10-CM

## 2021-10-18 DIAGNOSIS — E78.5 HYPERLIPIDEMIA, UNSPECIFIED HYPERLIPIDEMIA TYPE: ICD-10-CM

## 2021-10-19 NOTE — TELEPHONE ENCOUNTER
"Routing refill request to provider for review/approval because:  Labs not current:  LDL  Patient needs to be seen because it has been more than 2 year since last office visit.    Last Written Prescription Date:  10/7/2020  Last Fill Quantity: 90,  # refills: 3   Last office visit provider:  10/2/2019     Requested Prescriptions   Pending Prescriptions Disp Refills     simvastatin (ZOCOR) 20 MG tablet 60 tablet 0     Sig: Take 1 tablet (20 mg) by mouth daily       Statins Protocol Failed - 10/18/2021  5:00 PM        Failed - LDL on file in past 12 months     Recent Labs   Lab Test 10/02/19  1009   LDL 61             Failed - Recent (12 mo) or future (30 days) visit within the authorizing provider's specialty     Patient has had an office visit with the authorizing provider or a provider within the authorizing providers department within the previous 12 mos or has a future within next 30 days. See \"Patient Info\" tab in inbasket, or \"Choose Columns\" in Meds & Orders section of the refill encounter.              Passed - No abnormal creatine kinase in past 12 months     No lab results found.             Passed - Medication is active on med list        Passed - Patient is age 18 or older             Kayla Adams RN 10/19/21 4:26 PM  "

## 2021-10-20 RX ORDER — SIMVASTATIN 20 MG
20 TABLET ORAL DAILY
Qty: 60 TABLET | Refills: 0 | Status: SHIPPED | OUTPATIENT
Start: 2021-10-20 | End: 2021-12-08

## 2021-11-23 ENCOUNTER — IMMUNIZATION (OUTPATIENT)
Dept: NURSING | Facility: CLINIC | Age: 71
End: 2021-11-23
Payer: MEDICARE

## 2021-11-23 PROCEDURE — 90662 IIV NO PRSV INCREASED AG IM: CPT

## 2021-11-23 PROCEDURE — 0004A PR COVID VAC PFIZER DIL RECON 30 MCG/0.3 ML IM: CPT

## 2021-11-23 PROCEDURE — G0008 ADMIN INFLUENZA VIRUS VAC: HCPCS

## 2021-11-23 PROCEDURE — 91300 PR COVID VAC PFIZER DIL RECON 30 MCG/0.3 ML IM: CPT

## 2021-12-06 DIAGNOSIS — E11.9 DIABETES MELLITUS, TYPE 2 (H): ICD-10-CM

## 2021-12-06 DIAGNOSIS — E66.9 OBESITY: ICD-10-CM

## 2021-12-06 DIAGNOSIS — E78.5 HYPERLIPIDEMIA, UNSPECIFIED HYPERLIPIDEMIA TYPE: ICD-10-CM

## 2021-12-06 DIAGNOSIS — I10 ESSENTIAL HYPERTENSION WITH GOAL BLOOD PRESSURE LESS THAN 140/90: ICD-10-CM

## 2021-12-07 NOTE — TELEPHONE ENCOUNTER
Routing refill request to provider for review/approval because:  Carmen given x1 and patient did not follow up, please advise

## 2021-12-08 RX ORDER — SIMVASTATIN 20 MG
TABLET ORAL
Qty: 90 TABLET | Refills: 3 | Status: SHIPPED | OUTPATIENT
Start: 2021-12-08 | End: 2022-03-25

## 2022-03-21 NOTE — TELEPHONE ENCOUNTER
"Renay Barclay" Tonysheliaadriana was seen and treated in our emergency department on 3/21/2022.  She may return to work on 03/23/2022.       If you have any questions or concerns, please don't hesitate to call.      Lisbeth Ferguson PA-C" Refill Approved    Rx renewed per Medication Renewal Policy. Medication was last renewed on 4/26/19    Angelia Carnes, [facility] Triage/Med Refill 5/18/2020     Requested Prescriptions   Pending Prescriptions Disp Refills     metFORMIN (GLUCOPHAGE) 1000 MG tablet [Pharmacy Med Name: METFORMIN 1000MG TABLETS] 180 tablet 3     Sig: TAKE 1 TABLET BY MOUTH TWICE DAILY WITH MEALS.       Metformin Refill Protocol Failed - 5/15/2020 10:22 AM        Failed - Visit with PCP or prescribing provider visit in last 6 months or next 3 months     Last office visit with prescriber/PCP: Visit date not found OR same dept: Visit date not found OR same specialty: Visit date not found Last physical: Visit date not found Last MTM visit: Visit date not found         Next appt within 3 mo: Visit date not found  Next physical within 3 mo: Visit date not found  Prescriber OR PCP: Cody Pacheco MD  Last diagnosis associated with med order: 1. Diabetes mellitus, type 2 (H)  - metFORMIN (GLUCOPHAGE) 1000 MG tablet [Pharmacy Med Name: METFORMIN 1000MG TABLETS]; TAKE 1 TABLET BY MOUTH TWICE DAILY WITH MEALS.  Dispense: 180 tablet; Refill: 3    2. Essential hypertension with goal blood pressure less than 140/90  - amLODIPine (NORVASC) 10 MG tablet [Pharmacy Med Name: AMLODIPINE BESYLATE 10MG TABLETS]; TAKE 1 TABLET(10 MG) BY MOUTH DAILY  Dispense: 90 tablet; Refill: 3    3. Hypertension  - hydroCHLOROthiazide (HYDRODIURIL) 25 MG tablet [Pharmacy Med Name: HYDROCHLOROTHIAZIDE 25MG TABLETS]; TAKE 1 TABLET(25 MG) BY MOUTH DAILY  Dispense: 90 tablet; Refill: 3     If protocol passes may refill for 12 months if within 3 months of last provider visit (or a total of 15 months).           Failed - A1C in last 6 months     Hemoglobin A1c   Date Value Ref Range Status   10/02/2019 5.8 3.5 - 6.0 % Final               Failed - Microalbumin in last year      No results found for: MICROALBUR               Passed - Blood pressure in last 12 months      BP Readings from Last 1 Encounters:   10/02/19 128/78             Passed - LFT or AST or ALT in last 12 months     Albumin   Date Value Ref Range Status   10/02/2019 4.3 3.5 - 5.0 g/dL Final     Bilirubin, Total   Date Value Ref Range Status   10/02/2019 0.4 0.0 - 1.0 mg/dL Final     Alkaline Phosphatase   Date Value Ref Range Status   10/02/2019 40 (L) 45 - 120 U/L Final     AST   Date Value Ref Range Status   10/02/2019 17 0 - 40 U/L Final     ALT   Date Value Ref Range Status   10/02/2019 19 0 - 45 U/L Final     Protein, Total   Date Value Ref Range Status   10/02/2019 7.3 6.0 - 8.0 g/dL Final                Passed - GFR or Serum Creatinine in last 6 months     GFR MDRD Non Af Amer   Date Value Ref Range Status   10/02/2019 >60 >60 mL/min/1.73m2 Final     GFR MDRD Af Amer   Date Value Ref Range Status   10/02/2019 >60 >60 mL/min/1.73m2 Final                amLODIPine (NORVASC) 10 MG tablet [Pharmacy Med Name: AMLODIPINE BESYLATE 10MG TABLETS] 90 tablet 3     Sig: TAKE 1 TABLET(10 MG) BY MOUTH DAILY       Calcium-Channel Blockers Protocol Passed - 5/15/2020 10:22 AM        Passed - PCP or prescribing provider visit in past 12 months or next 3 months     Last office visit with prescriber/PCP: 4/25/2018 Cody Pacheco MD OR same dept: Visit date not found OR same specialty: Visit date not found  Last physical: 10/2/2019 Last MTM visit: Visit date not found   Next visit within 3 mo: Visit date not found  Next physical within 3 mo: Visit date not found  Prescriber OR PCP: Cody Pacheco MD  Last diagnosis associated with med order: 1. Diabetes mellitus, type 2 (H)  - metFORMIN (GLUCOPHAGE) 1000 MG tablet [Pharmacy Med Name: METFORMIN 1000MG TABLETS]; TAKE 1 TABLET BY MOUTH TWICE DAILY WITH MEALS.  Dispense: 180 tablet; Refill: 3    2. Essential hypertension with goal blood pressure less than 140/90  - amLODIPine (NORVASC) 10 MG tablet [Pharmacy Med Name: AMLODIPINE BESYLATE 10MG TABLETS]; TAKE 1  TABLET(10 MG) BY MOUTH DAILY  Dispense: 90 tablet; Refill: 3    3. Hypertension  - hydroCHLOROthiazide (HYDRODIURIL) 25 MG tablet [Pharmacy Med Name: HYDROCHLOROTHIAZIDE 25MG TABLETS]; TAKE 1 TABLET(25 MG) BY MOUTH DAILY  Dispense: 90 tablet; Refill: 3    If protocol passes may refill for 12 months if within 3 months of last provider visit (or a total of 15 months).             Passed - Blood pressure filed in past 12 months     BP Readings from Last 1 Encounters:   10/02/19 128/78                hydroCHLOROthiazide (HYDRODIURIL) 25 MG tablet [Pharmacy Med Name: HYDROCHLOROTHIAZIDE 25MG TABLETS] 90 tablet 3     Sig: TAKE 1 TABLET(25 MG) BY MOUTH DAILY       Diuretics/Combination Diuretics Refill Protocol  Passed - 5/15/2020 10:22 AM        Passed - Visit with PCP or prescribing provider visit in past 12 months     Last office visit with prescriber/PCP: 4/25/2018 Cody Pacheco MD OR same dept: Visit date not found OR same specialty: Visit date not found  Last physical: 10/2/2019 Last MTM visit: Visit date not found   Next visit within 3 mo: Visit date not found  Next physical within 3 mo: Visit date not found  Prescriber OR PCP: Cody Pacheco MD  Last diagnosis associated with med order: 1. Diabetes mellitus, type 2 (H)  - metFORMIN (GLUCOPHAGE) 1000 MG tablet [Pharmacy Med Name: METFORMIN 1000MG TABLETS]; TAKE 1 TABLET BY MOUTH TWICE DAILY WITH MEALS.  Dispense: 180 tablet; Refill: 3    2. Essential hypertension with goal blood pressure less than 140/90  - amLODIPine (NORVASC) 10 MG tablet [Pharmacy Med Name: AMLODIPINE BESYLATE 10MG TABLETS]; TAKE 1 TABLET(10 MG) BY MOUTH DAILY  Dispense: 90 tablet; Refill: 3    3. Hypertension  - hydroCHLOROthiazide (HYDRODIURIL) 25 MG tablet [Pharmacy Med Name: HYDROCHLOROTHIAZIDE 25MG TABLETS]; TAKE 1 TABLET(25 MG) BY MOUTH DAILY  Dispense: 90 tablet; Refill: 3    If protocol passes may refill for 12 months if within 3 months of last provider visit (or a total of  15 months).             Passed - Serum Potassium in past 12 months      Lab Results   Component Value Date    Potassium 4.3 10/02/2019             Passed - Serum Sodium in past 12 months      Lab Results   Component Value Date    Sodium 142 10/02/2019             Passed - Blood pressure on file in past 12 months     BP Readings from Last 1 Encounters:   10/02/19 128/78             Passed - Serum Creatinine in past 12 months      Creatinine   Date Value Ref Range Status   10/02/2019 1.01 0.70 - 1.30 mg/dL Final

## 2022-03-25 ENCOUNTER — OFFICE VISIT (OUTPATIENT)
Dept: INTERNAL MEDICINE | Facility: CLINIC | Age: 72
End: 2022-03-25
Payer: MEDICARE

## 2022-03-25 VITALS
BODY MASS INDEX: 35.23 KG/M2 | WEIGHT: 219.2 LBS | SYSTOLIC BLOOD PRESSURE: 130 MMHG | RESPIRATION RATE: 20 BRPM | HEIGHT: 66 IN | DIASTOLIC BLOOD PRESSURE: 70 MMHG | OXYGEN SATURATION: 99 % | TEMPERATURE: 97.5 F | HEART RATE: 98 BPM

## 2022-03-25 DIAGNOSIS — Z12.5 SCREENING FOR PROSTATE CANCER: ICD-10-CM

## 2022-03-25 DIAGNOSIS — Z00.00 ANNUAL PHYSICAL EXAM: Primary | ICD-10-CM

## 2022-03-25 DIAGNOSIS — I10 ESSENTIAL HYPERTENSION: ICD-10-CM

## 2022-03-25 DIAGNOSIS — E78.2 MIXED HYPERLIPIDEMIA: ICD-10-CM

## 2022-03-25 DIAGNOSIS — E11.9 TYPE 2 DIABETES MELLITUS WITHOUT COMPLICATION, WITHOUT LONG-TERM CURRENT USE OF INSULIN (H): ICD-10-CM

## 2022-03-25 DIAGNOSIS — E66.01 MORBID OBESITY (H): ICD-10-CM

## 2022-03-25 DIAGNOSIS — N18.31 STAGE 3A CHRONIC KIDNEY DISEASE (H): ICD-10-CM

## 2022-03-25 LAB
ALBUMIN SERPL-MCNC: 4.3 G/DL (ref 3.5–5)
ALBUMIN UR-MCNC: NEGATIVE MG/DL
ALP SERPL-CCNC: 45 U/L (ref 45–120)
ALT SERPL W P-5'-P-CCNC: 30 U/L (ref 0–45)
ANION GAP SERPL CALCULATED.3IONS-SCNC: 17 MMOL/L (ref 5–18)
APPEARANCE UR: CLEAR
AST SERPL W P-5'-P-CCNC: 27 U/L (ref 0–40)
BACTERIA #/AREA URNS HPF: ABNORMAL /HPF
BILIRUB SERPL-MCNC: 0.4 MG/DL (ref 0–1)
BILIRUB UR QL STRIP: NEGATIVE
BUN SERPL-MCNC: 24 MG/DL (ref 8–28)
CALCIUM SERPL-MCNC: 10.2 MG/DL (ref 8.5–10.5)
CHLORIDE BLD-SCNC: 101 MMOL/L (ref 98–107)
CHOLEST SERPL-MCNC: 157 MG/DL
CO2 SERPL-SCNC: 22 MMOL/L (ref 22–31)
COLOR UR AUTO: YELLOW
CREAT SERPL-MCNC: 1.03 MG/DL (ref 0.7–1.3)
ERYTHROCYTE [DISTWIDTH] IN BLOOD BY AUTOMATED COUNT: 12.4 % (ref 10–15)
FASTING STATUS PATIENT QL REPORTED: YES
GFR SERPL CREATININE-BSD FRML MDRD: 77 ML/MIN/1.73M2
GLUCOSE BLD-MCNC: 133 MG/DL (ref 70–125)
GLUCOSE UR STRIP-MCNC: NEGATIVE MG/DL
HBA1C MFR BLD: 6.4 % (ref 0–5.6)
HCT VFR BLD AUTO: 45.6 % (ref 40–53)
HDLC SERPL-MCNC: 48 MG/DL
HGB BLD-MCNC: 15.6 G/DL (ref 13.3–17.7)
HGB UR QL STRIP: NEGATIVE
KETONES UR STRIP-MCNC: NEGATIVE MG/DL
LDLC SERPL CALC-MCNC: 81 MG/DL
LEUKOCYTE ESTERASE UR QL STRIP: ABNORMAL
MCH RBC QN AUTO: 29.5 PG (ref 26.5–33)
MCHC RBC AUTO-ENTMCNC: 34.2 G/DL (ref 31.5–36.5)
MCV RBC AUTO: 86 FL (ref 78–100)
NITRATE UR QL: NEGATIVE
PH UR STRIP: 5.5 [PH] (ref 5–8)
PLATELET # BLD AUTO: 249 10E3/UL (ref 150–450)
POTASSIUM BLD-SCNC: 4.6 MMOL/L (ref 3.5–5)
PROT SERPL-MCNC: 7.8 G/DL (ref 6–8)
PSA SERPL-MCNC: 4.05 UG/L (ref 0–6.5)
RBC # BLD AUTO: 5.28 10E6/UL (ref 4.4–5.9)
RBC #/AREA URNS AUTO: ABNORMAL /HPF
SODIUM SERPL-SCNC: 140 MMOL/L (ref 136–145)
SP GR UR STRIP: >=1.03 (ref 1–1.03)
SQUAMOUS #/AREA URNS AUTO: ABNORMAL /LPF
TRIGL SERPL-MCNC: 139 MG/DL
UROBILINOGEN UR STRIP-ACNC: 0.2 E.U./DL
WBC # BLD AUTO: 8.3 10E3/UL (ref 4–11)
WBC #/AREA URNS AUTO: ABNORMAL /HPF

## 2022-03-25 PROCEDURE — 81001 URINALYSIS AUTO W/SCOPE: CPT | Performed by: INTERNAL MEDICINE

## 2022-03-25 PROCEDURE — 80053 COMPREHEN METABOLIC PANEL: CPT | Performed by: INTERNAL MEDICINE

## 2022-03-25 PROCEDURE — G0439 PPPS, SUBSEQ VISIT: HCPCS | Performed by: INTERNAL MEDICINE

## 2022-03-25 PROCEDURE — 36415 COLL VENOUS BLD VENIPUNCTURE: CPT | Performed by: INTERNAL MEDICINE

## 2022-03-25 PROCEDURE — 83036 HEMOGLOBIN GLYCOSYLATED A1C: CPT | Performed by: INTERNAL MEDICINE

## 2022-03-25 PROCEDURE — G0103 PSA SCREENING: HCPCS | Performed by: INTERNAL MEDICINE

## 2022-03-25 PROCEDURE — 85027 COMPLETE CBC AUTOMATED: CPT | Performed by: INTERNAL MEDICINE

## 2022-03-25 PROCEDURE — 99214 OFFICE O/P EST MOD 30 MIN: CPT | Mod: 25 | Performed by: INTERNAL MEDICINE

## 2022-03-25 PROCEDURE — 80061 LIPID PANEL: CPT | Performed by: INTERNAL MEDICINE

## 2022-03-25 RX ORDER — SIMVASTATIN 20 MG
20 TABLET ORAL DAILY
Qty: 90 TABLET | Refills: 3
Start: 2022-03-25 | End: 2023-02-07

## 2022-03-25 RX ORDER — AMLODIPINE BESYLATE 10 MG/1
10 TABLET ORAL DAILY
Qty: 90 TABLET | Refills: 3
Start: 2022-03-25 | End: 2023-07-21

## 2022-03-25 RX ORDER — LISINOPRIL 20 MG/1
20 TABLET ORAL DAILY
Qty: 90 TABLET | Refills: 3
Start: 2022-03-25 | End: 2023-07-21

## 2022-03-25 RX ORDER — HYDROCHLOROTHIAZIDE 25 MG/1
25 TABLET ORAL DAILY
Qty: 90 TABLET | Refills: 3
Start: 2022-03-25 | End: 2023-07-21

## 2022-03-25 ASSESSMENT — ACTIVITIES OF DAILY LIVING (ADL): CURRENT_FUNCTION: NO ASSISTANCE NEEDED

## 2022-03-25 NOTE — PROGRESS NOTES
"SUBJECTIVE:   Chemo Casillas is a 72 year old male who presents for Preventive Visit.  This 72-year-old man comes in for annual wellness visit and follow-up of numerous medical problems.  Is been a couple years since have seen him.  Overall he is doing well.  He continues to take his medications.  Not checking blood sugars.  No chest pain or shortness of breath.  No nausea or vomiting.  Has not been exercising as much as he would like.  Is trying to eat a healthy diet.    Patient has been advised of split billing requirements and indicates understanding: Yes  Are you in the first 12 months of your Medicare coverage?  No    Healthy Habits:     In general, how would you rate your overall health?  Good    Frequency of exercise:  None    Do you usually eat at least 4 servings of fruit and vegetables a day, include whole grains    & fiber and avoid regularly eating high fat or \"junk\" foods?  Yes    Taking medications regularly:  Yes    Medication side effects:  None    Ability to successfully perform activities of daily living:  No assistance needed    Home Safety:  No safety concerns identified    Hearing Impairment:  No hearing concerns    In the past 6 months, have you been bothered by leaking of urine? Yes    In general, how would you rate your overall mental or emotional health?  Good      PHQ-2 Total Score: 0    Additional concerns today:  No    Do you feel safe in your environment? Yes    Have you ever done Advance Care Planning? (For example, a Health Directive, POLST, or a discussion with a medical provider or your loved ones about your wishes): No, advance care planning information given to patient to review.  Patient declined advance care planning discussion at this time.       Fall risk  Fallen 2 or more times in the past year?: No  Any fall with injury in the past year?: No    Cognitive Screening   1) Repeat 3 items (Leader, Season, Table)    2) Clock draw: ABNORMAL Not unexpected  3) 3 item recall: " "Recalls 2 objects   Results: ABNORMAL clock, 1-2 items recalled: PROBABLE COGNITIVE IMPAIRMENT, **INFORM PROVIDER**    Mini-CogTM Copyright S Soraya. Licensed by the author for use in [facility]; reprinted with permission (teresa@[facility]). All rights reserved.      Do you have sleep apnea, excessive snoring or daytime drowsiness?: no    Reviewed and updated as needed this visit by clinical staff   Tobacco  Allergies               Reviewed and updated as needed this visit by Provider                 Social History     Tobacco Use     Smoking status: Never Smoker     Smokeless tobacco: Never Used   Substance Use Topics     Alcohol use: Yes     Comment: Alcoholic Drinks/day: rare         Alcohol Use 3/25/2022   Prescreen: >3 drinks/day or >7 drinks/week? Not Applicable               Current providers sharing in care for this patient include:   Patient Care Team:  Cody Pacheco MD as PCP - General  Cody Pacheco MD as Assigned PCP    The following health maintenance items are reviewed in Epic and correct as of today:  Health Maintenance Due   Topic Date Due     MICROALBUMIN  Never done     DIABETIC FOOT EXAM  Never done     ANNUAL REVIEW OF  ORDERS  Never done     URINALYSIS  Never done     HEPATITIS C SCREENING  Never done     ZOSTER IMMUNIZATION (1 of 2) Never done     AORTIC ANEURYSM SCREENING (SYSTEM ASSIGNED)  Never done     DTAP/TDAP/TD IMMUNIZATION (2 - Td or Tdap) 01/16/2018     A1C  04/02/2020     BMP  10/02/2020     LIPID  10/02/2020     HEMOGLOBIN  10/02/2020         Review of Systems  Constitutional, HEENT, cardiovascular, pulmonary, GI, , musculoskeletal, neuro, skin, endocrine and psych systems are negative, except as otherwise noted.    OBJECTIVE:   BP (!) 148/80 (BP Location: Left arm, Patient Position: Sitting, Cuff Size: Adult Large)   Pulse 98   Temp 97.5  F (36.4  C) (Oral)   Resp 20   Ht 1.676 m (5' 6\")   Wt 99.4 kg (219 lb 3.2 oz)   SpO2 99%   BMI 35.38 kg/m   " "Estimated body mass index is 35.38 kg/m  as calculated from the following:    Height as of this encounter: 1.676 m (5' 6\").    Weight as of this encounter: 99.4 kg (219 lb 3.2 oz).  Physical Exam  EYES: Eyelids, conjunctiva, and sclera were normal. Pupils were normal. Cornea, iris, and lens were normal bilaterally.  HEAD, EARS, NOSE, MOUTH, AND THROAT: Head and face were normal. Hearing was normal to voice and the ears were normal to external exam.   NECK: Neck appearance was normal. There were no neck masses and the thyroid was not enlarged.  RESPIRATORY: Breathing pattern was normal and the chest moved symmetrically.  Percussion/auscultatory percussion was normal.  Lung sounds were normal and there were no abnormal sounds.  CARDIOVASCULAR: Heart rate and rhythm were normal.  S1 and S2 were normal and there were no extra sounds or murmurs. Peripheral pulses in arms and legs were normal.  Jugular venous pressure was normal.  There was no peripheral edema.  GASTROINTESTINAL: The abdomen was normal in contour.  Bowel sounds were present.  Percussion detected no organ enlargement or tenderness.  Palpation detected no tenderness, mass, or enlarged organs.   MUSCULOSKELETAL: Skeletal configuration was normal and muscle mass was normal for age. Joint appearance was overall normal.  LYMPHATIC: There were no enlarged nodes.  SKIN/HAIR/NAILS: Skin color was normal.  There were no skin lesions.  Hair and nails were normal.  NEUROLOGIC: The patient was alert and oriented to person, place, time, and circumstance. Speech was normal. Cranial nerves were normal. Motor strength was normal for age. The patient was normally coordinated.  PSYCHIATRIC:  Mood and affect were normal and the patient had normal recent and remote memory. The patient's judgment and insight were normal.      ASSESSMENT / PLAN:   1. Annual physical exam  This 72-year-old man comes in for annual wellness visit, overall he is doing well and issues are as " "discussed below.  - REVIEW OF HEALTH MAINTENANCE PROTOCOL ORDERS    2. Screening for prostate cancer  - Prostate Specific Antigen Screen; Future  - Prostate Specific Antigen Screen    3. Type 2 diabetes mellitus without complication, without long-term current use of insulin (H)  Plan a few years since rechecking A1c, continue current plan, obtain eye exam, excellent diabetic foot care  - metFORMIN (GLUCOPHAGE) 1000 MG tablet; Take 1 tablet (1,000 mg) by mouth 2 times daily (with meals)  Dispense: 180 tablet; Refill: 3  - Comprehensive metabolic panel; Future  - Hemoglobin A1c; Future  - Comprehensive metabolic panel  - Hemoglobin A1c    4. Essential hypertension  Blood pressure control continues to  - lisinopril (ZESTRIL) 20 MG tablet; Take 1 tablet (20 mg) by mouth daily  Dispense: 90 tablet; Refill: 3  - hydrochlorothiazide (HYDRODIURIL) 25 MG tablet; Take 1 tablet (25 mg) by mouth daily  Dispense: 90 tablet; Refill: 3  - amLODIPine (NORVASC) 10 MG tablet; Take 1 tablet (10 mg) by mouth daily  Dispense: 90 tablet; Refill: 3  - CBC with platelets; Future  - CBC with platelets    5. Mixed hyperlipidemia  - simvastatin (ZOCOR) 20 MG tablet; Take 1 tablet (20 mg) by mouth daily  Dispense: 90 tablet; Refill: 3  - Lipid panel reflex to direct LDL Fasting; Future  - Lipid panel reflex to direct LDL Fasting    6. Stage 3a chronic kidney disease (H)  Minimal, continue lisinopril for renal protection  - UA reflex to Microscopic and Culture; Future  - UA reflex to Microscopic and Culture  - Urine Microscopic    7. Morbid obesity (H)  COUNSELING:  Estimated body mass index is 35.38 kg/m  as calculated from the following:    Height as of this encounter: 1.676 m (5' 6\").    Weight as of this encounter: 99.4 kg (219 lb 3.2 oz).  Weight management plan: Discussed healthy diet and exercise guidelines    He reports that he has never smoked. He has never used smokeless tobacco.      Appropriate preventive services were discussed " with this patient, including applicable screening as appropriate for cardiovascular disease, diabetes, osteopenia/osteoporosis, and glaucoma.  As appropriate for age/gender, discussed screening for colorectal cancer, prostate cancer, breast cancer, and cervical cancer. Checklist reviewing preventive services available has been given to the patient.    Reviewed patients plan of care and provided an AVS. The Basic Care Plan (routine screening as documented in Health Maintenance) for Chemo meets the Care Plan requirement. This Care Plan has been established and reviewed with the Patient.    Counseling Resources:  ATP IV Guidelines  Pooled Cohorts Equation Calculator  Breast Cancer Risk Calculator  Breast Cancer: Medication to Reduce Risk  FRAX Risk Assessment  ICSI Preventive Guidelines  Dietary Guidelines for Americans, 2010  USDA's MyPlate  ASA Prophylaxis  Lung CA Screening    Cody Pacheco MD  [facility]    Identified Health Risks:

## 2022-03-25 NOTE — LETTER
March 29, 2022      Chemo Casillas  [address] [place] [address]      Dear ,    We are writing to inform you of your test results.      Resulted Orders   CBC with platelets   Result Value Ref Range    WBC Count 8.3 4.0 - 11.0 10e3/uL    RBC Count 5.28 4.40 - 5.90 10e6/uL    Hemoglobin 15.6 13.3 - 17.7 g/dL    Hematocrit 45.6 40.0 - 53.0 %    MCV 86 78 - 100 fL    MCH 29.5 26.5 - 33.0 pg    MCHC 34.2 31.5 - 36.5 g/dL    RDW 12.4 10.0 - 15.0 %    Platelet Count 249 150 - 450 10e3/uL   Comprehensive metabolic panel   Result Value Ref Range    Sodium 140 136 - 145 mmol/L    Potassium 4.6 3.5 - 5.0 mmol/L    Chloride 101 98 - 107 mmol/L    Carbon Dioxide (CO2) 22 22 - 31 mmol/L    Anion Gap 17 5 - 18 mmol/L    Urea Nitrogen 24 8 - 28 mg/dL    Creatinine 1.03 0.70 - 1.30 mg/dL    Calcium 10.2 8.5 - 10.5 mg/dL    Glucose 133 (H) 70 - 125 mg/dL    Alkaline Phosphatase 45 45 - 120 U/L    AST 27 0 - 40 U/L    ALT 30 0 - 45 U/L    Protein Total 7.8 6.0 - 8.0 g/dL    Albumin 4.3 3.5 - 5.0 g/dL    Bilirubin Total 0.4 0.0 - 1.0 mg/dL    GFR Estimate 77 >60 mL/min/1.73m2      Comment:      Effective December 21, 2021 eGFRcr in adults is calculated using the 2021 CKD-EPI creatinine equation which includes age and gender (Gamal et al., NEJM, DOI: 10.1056/HDASew4899669)   Hemoglobin A1c   Result Value Ref Range    Hemoglobin A1C 6.4 (H) 0.0 - 5.6 %      Comment:      Normal <5.7%   Prediabetes 5.7-6.4%    Diabetes 6.5% or higher     Note: Adopted from ADA consensus guidelines.   Lipid panel reflex to direct LDL Fasting   Result Value Ref Range    Cholesterol 157 <=199 mg/dL    Triglycerides 139 <=149 mg/dL    Direct Measure HDL 48 >=40 mg/dL      Comment:      HDL Cholesterol Reference Range:     0-2 years:   No reference ranges established for patients under 2 years old  at [facility] for lipid analytes.    2-8 years:  Greater than 45 mg/dL     18 years and older:   Female: Greater than or equal  to 50 mg/dL   Male:   Greater than or equal to 40 mg/dL    LDL Cholesterol Calculated 81 <=129 mg/dL    Patient Fasting > 8hrs? Yes    Prostate Specific Antigen Screen   Result Value Ref Range    Prostate Specific Antigen Screen 4.05 0.00 - 6.50 ug/L    Narrative    Assay Method is Abbott Prostate-Specific Antigen (PSA)  Standard-WHO 1st International (90:10)   UA reflex to Microscopic and Culture   Result Value Ref Range    Color Urine Yellow Colorless, Straw, Light Yellow, Yellow    Appearance Urine Clear Clear    Glucose Urine Negative Negative mg/dL    Bilirubin Urine Negative Negative    Ketones Urine Negative Negative mg/dL    Specific Gravity Urine >=1.030 1.005 - 1.030    Blood Urine Negative Negative    pH Urine 5.5 5.0 - 8.0    Protein Albumin Urine Negative Negative mg/dL    Urobilinogen Urine 0.2 0.2, 1.0 E.U./dL    Nitrite Urine Negative Negative    Leukocyte Esterase Urine Small (A) Negative   Urine Microscopic   Result Value Ref Range    Bacteria Urine Few (A) None Seen /HPF    RBC Urine 0-2 0-2 /HPF /HPF    WBC Urine 5-10 (A) 0-5 /HPF /HPF    Squamous Epithelials Urine Few (A) None Seen /LPF    Narrative    Urine Culture not indicated     Your labs look okay, excellent    If you have any questions or concerns, please call the clinic at the number listed above.       Sincerely,    Cody Pacheco MD

## 2022-06-14 ENCOUNTER — TRANSFERRED RECORDS (OUTPATIENT)
Dept: HEALTH INFORMATION MANAGEMENT | Facility: CLINIC | Age: 72
End: 2022-06-14
Payer: COMMERCIAL

## 2022-06-14 LAB — RETINOPATHY: POSITIVE

## 2022-08-09 DIAGNOSIS — E11.9 TYPE 2 DIABETES MELLITUS WITHOUT COMPLICATION, WITHOUT LONG-TERM CURRENT USE OF INSULIN (H): ICD-10-CM

## 2022-08-09 DIAGNOSIS — I10 ESSENTIAL HYPERTENSION: ICD-10-CM

## 2022-08-10 RX ORDER — AMLODIPINE BESYLATE 10 MG/1
10 TABLET ORAL DAILY
Qty: 90 TABLET | Refills: 3 | OUTPATIENT
Start: 2022-08-10

## 2022-08-10 RX ORDER — LISINOPRIL 20 MG/1
20 TABLET ORAL DAILY
Qty: 90 TABLET | Refills: 3 | OUTPATIENT
Start: 2022-08-10

## 2022-08-10 RX ORDER — HYDROCHLOROTHIAZIDE 25 MG/1
25 TABLET ORAL DAILY
Qty: 90 TABLET | Refills: 3 | OUTPATIENT
Start: 2022-08-10

## 2022-08-10 NOTE — TELEPHONE ENCOUNTER
"Refill denied, should have refills on file  Last Written Prescription Date:  3/25/22  Last Fill Quantity: 90,  # refills: 3   Last office visit provider:  3/25/22     Requested Prescriptions   Pending Prescriptions Disp Refills     amLODIPine (NORVASC) 10 MG tablet 90 tablet 3     Sig: Take 1 tablet (10 mg) by mouth daily       Calcium Channel Blockers Protocol  Passed - 8/9/2022  9:10 AM        Passed - Blood pressure under 140/90 in past 12 months     BP Readings from Last 3 Encounters:   03/25/22 130/70   10/02/19 128/78                 Passed - Recent (12 mo) or future (30 days) visit within the authorizing provider's specialty     Patient has had an office visit with the authorizing provider or a provider within the authorizing providers department within the previous 12 mos or has a future within next 30 days. See \"Patient Info\" tab in inbasket, or \"Choose Columns\" in Meds & Orders section of the refill encounter.              Passed - Medication is active on med list        Passed - Patient is age 18 or older        Passed - Normal serum creatinine on file in past 12 months     Recent Labs   Lab Test 03/25/22  1058   CR 1.03       Ok to refill medication if creatinine is low        Refill denied, should have refills on file  Last Written Prescription Date:  3/25/22  Last Fill Quantity: 90,  # refills: 3   Last office visit provider:  3/25/22        hydrochlorothiazide (HYDRODIURIL) 25 MG tablet 90 tablet 3     Sig: Take 1 tablet (25 mg) by mouth daily       Diuretics (Including Combos) Protocol Passed - 8/9/2022  9:10 AM        Passed - Blood pressure under 140/90 in past 12 months     BP Readings from Last 3 Encounters:   03/25/22 130/70   10/02/19 128/78                 Passed - Recent (12 mo) or future (30 days) visit within the authorizing provider's specialty     Patient has had an office visit with the authorizing provider or a provider within the authorizing providers department within the previous 12 " "mos or has a future within next 30 days. See \"Patient Info\" tab in inbasket, or \"Choose Columns\" in Meds & Orders section of the refill encounter.              Passed - Medication is active on med list        Passed - Patient is age 18 or older        Passed - Normal serum creatinine on file in past 12 months     Recent Labs   Lab Test 03/25/22  1058   CR 1.03              Passed - Normal serum potassium on file in past 12 months     Recent Labs   Lab Test 03/25/22  1058   POTASSIUM 4.6                    Passed - Normal serum sodium on file in past 12 months     Recent Labs   Lab Test 03/25/22  1058              Refill denied, should have refills on file   Last Written Prescription Date:  3/25/22  Last Fill Quantity: 180,  # refills: 3   Last office visit provider:  3/25/22        metFORMIN (GLUCOPHAGE) 1000 MG tablet 180 tablet 3     Sig: Take 1 tablet (1,000 mg) by mouth 2 times daily (with meals)       Biguanide Agents Passed - 8/9/2022  9:10 AM        Passed - Patient is age 10 or older        Passed - Patient has documented A1c within the specified period of time.     If HgbA1C is 8 or greater, it needs to be on file within the past 3 months.  If less than 8, must be on file within the past 6 months.     Recent Labs   Lab Test 03/25/22  1058   A1C 6.4*             Passed - Patient's CR is NOT>1.4 OR Patient's EGFR is NOT<45 within past 12 mos.     Recent Labs   Lab Test 03/25/22  1058 10/02/19  1009   GFRESTIMATED 77 >60   GFRESTBLACK  --  >60       Recent Labs   Lab Test 03/25/22  1058   CR 1.03             Passed - Patient does NOT have a diagnosis of CHF.        Passed - Medication is active on med list        Passed - Recent (6 mo) or future (30 days) visit within the authorizing provider's specialty     Patient had office visit in the last 6 months or has a visit in the next 30 days with authorizing provider or within the authorizing provider's specialty.  See \"Patient Info\" tab in inbasket, or " "\"Choose Columns\" in Meds & Orders section of the refill encounter.          Refill denied, should have refills on file  Last Written Prescription Date:  3/25/22  Last Fill Quantity: 90,  # refills: 3   Last office visit provider:  3/25/22        lisinopril (ZESTRIL) 20 MG tablet 90 tablet 3     Sig: Take 1 tablet (20 mg) by mouth daily       ACE Inhibitors (Including Combos) Protocol Passed - 8/9/2022  9:10 AM        Passed - Blood pressure under 140/90 in past 12 months     BP Readings from Last 3 Encounters:   03/25/22 130/70   10/02/19 128/78                 Passed - Recent (12 mo) or future (30 days) visit within the authorizing provider's specialty     Patient has had an office visit with the authorizing provider or a provider within the authorizing providers department within the previous 12 mos or has a future within next 30 days. See \"Patient Info\" tab in inbasket, or \"Choose Columns\" in Meds & Orders section of the refill encounter.              Passed - Medication is active on med list        Passed - Patient is age 18 or older        Passed - Normal serum creatinine on file in past 12 months     Recent Labs   Lab Test 03/25/22  1058   CR 1.03       Ok to refill medication if creatinine is low          Passed - Normal serum potassium on file in past 12 months     Recent Labs   Lab Test 03/25/22  1058   POTASSIUM 4.6                  Shania Hernandes RN 08/10/22 12:37 AM  "

## 2023-02-06 DIAGNOSIS — E78.2 MIXED HYPERLIPIDEMIA: ICD-10-CM

## 2023-02-07 RX ORDER — SIMVASTATIN 20 MG
20 TABLET ORAL DAILY
Qty: 90 TABLET | Refills: 0 | Status: SHIPPED | OUTPATIENT
Start: 2023-02-07 | End: 2023-07-21

## 2023-02-07 NOTE — TELEPHONE ENCOUNTER
"Drug interaction warning    Last Written Prescription Date:  3/25/22  Last Fill Quantity: 90,  # refills: 3   Last office visit provider:  3/25/22     Requested Prescriptions   Pending Prescriptions Disp Refills     simvastatin (ZOCOR) 20 MG tablet 90 tablet 3     Sig: Take 1 tablet (20 mg) by mouth daily       Statins Protocol Passed - 2/6/2023  4:42 PM        Passed - LDL on file in past 12 months     Recent Labs   Lab Test 03/25/22  1058   LDL 81             Passed - No abnormal creatine kinase in past 12 months     No lab results found.             Passed - Recent (12 mo) or future (30 days) visit within the authorizing provider's specialty     Patient has had an office visit with the authorizing provider or a provider within the authorizing providers department within the previous 12 mos or has a future within next 30 days. See \"Patient Info\" tab in inbasket, or \"Choose Columns\" in Meds & Orders section of the refill encounter.              Passed - Medication is active on med list        Passed - Patient is age 18 or older             Angelia Carnes RN 02/07/23 2:15 PM  "

## 2023-06-20 ENCOUNTER — TRANSFERRED RECORDS (OUTPATIENT)
Dept: HEALTH INFORMATION MANAGEMENT | Facility: CLINIC | Age: 73
End: 2023-06-20
Payer: MEDICARE

## 2023-06-20 LAB — RETINOPATHY: POSITIVE

## 2023-07-21 ENCOUNTER — OFFICE VISIT (OUTPATIENT)
Dept: INTERNAL MEDICINE | Facility: CLINIC | Age: 73
End: 2023-07-21
Payer: MEDICARE

## 2023-07-21 VITALS
HEIGHT: 66 IN | SYSTOLIC BLOOD PRESSURE: 180 MMHG | OXYGEN SATURATION: 96 % | DIASTOLIC BLOOD PRESSURE: 90 MMHG | HEART RATE: 80 BPM | BODY MASS INDEX: 33.73 KG/M2 | RESPIRATION RATE: 14 BRPM | TEMPERATURE: 98.6 F | WEIGHT: 209.9 LBS

## 2023-07-21 DIAGNOSIS — Z23 HIGH PRIORITY FOR 2019-NCOV VACCINE: ICD-10-CM

## 2023-07-21 DIAGNOSIS — Z00.00 ANNUAL PHYSICAL EXAM: Primary | ICD-10-CM

## 2023-07-21 DIAGNOSIS — E11.42 DIABETIC POLYNEUROPATHY ASSOCIATED WITH TYPE 2 DIABETES MELLITUS (H): ICD-10-CM

## 2023-07-21 DIAGNOSIS — E11.3293 TYPE 2 DIABETES MELLITUS WITH BOTH EYES AFFECTED BY MILD NONPROLIFERATIVE RETINOPATHY WITHOUT MACULAR EDEMA, WITHOUT LONG-TERM CURRENT USE OF INSULIN (H): ICD-10-CM

## 2023-07-21 DIAGNOSIS — I10 ESSENTIAL HYPERTENSION: ICD-10-CM

## 2023-07-21 DIAGNOSIS — N18.31 STAGE 3A CHRONIC KIDNEY DISEASE (H): ICD-10-CM

## 2023-07-21 DIAGNOSIS — E78.2 MIXED HYPERLIPIDEMIA: ICD-10-CM

## 2023-07-21 DIAGNOSIS — E66.01 MORBID OBESITY (H): ICD-10-CM

## 2023-07-21 LAB
ALBUMIN SERPL BCG-MCNC: 4.5 G/DL (ref 3.5–5.2)
ALP SERPL-CCNC: 62 U/L (ref 40–129)
ALT SERPL W P-5'-P-CCNC: 40 U/L (ref 0–70)
ANION GAP SERPL CALCULATED.3IONS-SCNC: 15 MMOL/L (ref 7–15)
AST SERPL W P-5'-P-CCNC: 32 U/L (ref 0–45)
BILIRUB SERPL-MCNC: 0.6 MG/DL
BUN SERPL-MCNC: 13.9 MG/DL (ref 8–23)
CALCIUM SERPL-MCNC: 9.5 MG/DL (ref 8.8–10.2)
CHLORIDE SERPL-SCNC: 104 MMOL/L (ref 98–107)
CHOLEST SERPL-MCNC: 187 MG/DL
CREAT SERPL-MCNC: 0.88 MG/DL (ref 0.67–1.17)
CREAT UR-MCNC: 249 MG/DL
DEPRECATED HCO3 PLAS-SCNC: 21 MMOL/L (ref 22–29)
ERYTHROCYTE [DISTWIDTH] IN BLOOD BY AUTOMATED COUNT: 12.5 % (ref 10–15)
GFR SERPL CREATININE-BSD FRML MDRD: 90 ML/MIN/1.73M2
GLUCOSE SERPL-MCNC: 147 MG/DL (ref 70–99)
HBA1C MFR BLD: 7.1 % (ref 0–5.6)
HCT VFR BLD AUTO: 45.4 % (ref 40–53)
HDLC SERPL-MCNC: 42 MG/DL
HGB BLD-MCNC: 15.9 G/DL (ref 13.3–17.7)
LDLC SERPL CALC-MCNC: 122 MG/DL
MCH RBC QN AUTO: 30 PG (ref 26.5–33)
MCHC RBC AUTO-ENTMCNC: 35 G/DL (ref 31.5–36.5)
MCV RBC AUTO: 86 FL (ref 78–100)
MICROALBUMIN UR-MCNC: 162 MG/L
MICROALBUMIN/CREAT UR: 65.06 MG/G CR (ref 0–17)
NONHDLC SERPL-MCNC: 145 MG/DL
PLATELET # BLD AUTO: 191 10E3/UL (ref 150–450)
POTASSIUM SERPL-SCNC: 3.9 MMOL/L (ref 3.4–5.3)
PROT SERPL-MCNC: 7.6 G/DL (ref 6.4–8.3)
RBC # BLD AUTO: 5.3 10E6/UL (ref 4.4–5.9)
SODIUM SERPL-SCNC: 140 MMOL/L (ref 136–145)
TRIGL SERPL-MCNC: 114 MG/DL
TSH SERPL DL<=0.005 MIU/L-ACNC: 2.9 UIU/ML (ref 0.3–4.2)
VIT B12 SERPL-MCNC: 575 PG/ML (ref 232–1245)
WBC # BLD AUTO: 7.6 10E3/UL (ref 4–11)

## 2023-07-21 PROCEDURE — 91312 COVID-19 BIVALENT 12+ (PFIZER): CPT | Performed by: INTERNAL MEDICINE

## 2023-07-21 PROCEDURE — 99214 OFFICE O/P EST MOD 30 MIN: CPT | Mod: 25 | Performed by: INTERNAL MEDICINE

## 2023-07-21 PROCEDURE — 80061 LIPID PANEL: CPT | Performed by: INTERNAL MEDICINE

## 2023-07-21 PROCEDURE — G0439 PPPS, SUBSEQ VISIT: HCPCS | Performed by: INTERNAL MEDICINE

## 2023-07-21 PROCEDURE — 0124A COVID-19 BIVALENT 12+ (PFIZER): CPT | Performed by: INTERNAL MEDICINE

## 2023-07-21 PROCEDURE — 82607 VITAMIN B-12: CPT | Performed by: INTERNAL MEDICINE

## 2023-07-21 PROCEDURE — 36415 COLL VENOUS BLD VENIPUNCTURE: CPT | Performed by: INTERNAL MEDICINE

## 2023-07-21 PROCEDURE — 80053 COMPREHEN METABOLIC PANEL: CPT | Performed by: INTERNAL MEDICINE

## 2023-07-21 PROCEDURE — 82570 ASSAY OF URINE CREATININE: CPT | Performed by: INTERNAL MEDICINE

## 2023-07-21 PROCEDURE — 84443 ASSAY THYROID STIM HORMONE: CPT | Performed by: INTERNAL MEDICINE

## 2023-07-21 PROCEDURE — 82043 UR ALBUMIN QUANTITATIVE: CPT | Performed by: INTERNAL MEDICINE

## 2023-07-21 PROCEDURE — 85027 COMPLETE CBC AUTOMATED: CPT | Performed by: INTERNAL MEDICINE

## 2023-07-21 PROCEDURE — 83036 HEMOGLOBIN GLYCOSYLATED A1C: CPT | Performed by: INTERNAL MEDICINE

## 2023-07-21 RX ORDER — AMLODIPINE BESYLATE 10 MG/1
10 TABLET ORAL DAILY
Qty: 90 TABLET | Refills: 4 | Status: SHIPPED | OUTPATIENT
Start: 2023-07-21 | End: 2024-09-16

## 2023-07-21 RX ORDER — ASPIRIN 81 MG/1
81 TABLET ORAL DAILY
Qty: 90 TABLET | Refills: 4 | Status: SHIPPED | OUTPATIENT
Start: 2023-07-21

## 2023-07-21 RX ORDER — ATORVASTATIN CALCIUM 20 MG/1
20 TABLET, FILM COATED ORAL DAILY
Qty: 90 TABLET | Refills: 4 | Status: SHIPPED | OUTPATIENT
Start: 2023-07-21 | End: 2024-09-16

## 2023-07-21 RX ORDER — LISINOPRIL AND HYDROCHLOROTHIAZIDE 20; 25 MG/1; MG/1
1 TABLET ORAL DAILY
Qty: 90 TABLET | Refills: 4 | Status: SHIPPED | OUTPATIENT
Start: 2023-07-21 | End: 2023-08-18

## 2023-07-21 ASSESSMENT — ENCOUNTER SYMPTOMS
HEARTBURN: 0
SHORTNESS OF BREATH: 0
PALPITATIONS: 1
WEAKNESS: 0
FREQUENCY: 0
SORE THROAT: 1
JOINT SWELLING: 0
HEMATURIA: 0
COUGH: 0
PARESTHESIAS: 0
CONSTIPATION: 0
NERVOUS/ANXIOUS: 1
DIARRHEA: 0
ARTHRALGIAS: 1
ABDOMINAL PAIN: 0
DYSURIA: 0
NAUSEA: 0
DIZZINESS: 0
MYALGIAS: 0
EYE PAIN: 0
HEMATOCHEZIA: 0
FEVER: 0
HEADACHES: 0
CHILLS: 0

## 2023-07-21 ASSESSMENT — ACTIVITIES OF DAILY LIVING (ADL)
CURRENT_FUNCTION: BATHING REQUIRES ASSISTANCE
CURRENT_FUNCTION: MEDICATION ADMINISTRATION REQUIRES ASSISTANCE
CURRENT_FUNCTION: HOUSEWORK REQUIRES ASSISTANCE
CURRENT_FUNCTION: MONEY MANAGEMENT REQUIRES ASSISTANCE
CURRENT_FUNCTION: TELEPHONE REQUIRES ASSISTANCE

## 2023-07-21 NOTE — PROGRESS NOTES
"    He is at risk for lack of exercise and has been provided with information to increase physical activity for the benefit of his well-being.  The patient was counseled and encouraged to consider modifying their diet and eating habits. He was provided with information on recommended healthy diet options.  The patient reports that he has difficulty with activities of daily living. I have asked that the patient make a follow up appointment in 4 weeks where this issue will be further evaluated and addressed.  Information on urinary incontinence and treatment options given to patient.  Answers for HPI/ROS submitted by the patient on 7/21/2023  In general, how would you rate your overall physical health?: good  Frequency of exercise:: None  Do you usually eat at least 4 servings of fruit and vegetables a day, include whole grains & fiber, and avoid regularly eating high fat or \"junk\" foods? : No  Taking medications regularly:: Yes  Medication side effects:: None  Activities of Daily Living: telephone requires assistance, housework requires assistance, bathing requires assistance, medication administration requires assistance, money management requires assistance  Home safety: no safety concerns identified  Hearing Impairment:: no hearing concerns  In the past 6 months, have you been bothered by leaking of urine?: Yes  abdominal pain: No  Blood in stool: No  Blood in urine: No  chest pain: No  chills: No  congestion: No  constipation: No  cough: No  diarrhea: No  dizziness: No  ear pain: No  eye pain: No  nervous/anxious: Yes  fever: No  frequency: No  genital sores: No  headaches: No  hearing loss: No  heartburn: No  arthralgias: Yes  joint swelling: No  peripheral edema: No  mood changes: No  myalgias: No  nausea: No  dysuria: No  palpitations: Yes  Skin sensation changes: No  sore throat: Yes  urgency: Yes  rash: No  shortness of breath: No  visual disturbance: No  weakness: No  impotence: No  penile discharge: No  In " general, how would you rate your overall mental or emotional health?: good  Additional concerns today:: No

## 2023-07-21 NOTE — PROGRESS NOTES
"SUBJECTIVE:   Chemo is a 73 year old who presents for Preventive Visit.      7/21/2023    10:39 AM   Additional Questions   Roomed by Francesca   Accompanied by sister-in-law Suzy         7/21/2023    10:39 AM   Patient Reported Additional Medications   Patient reports taking the following new medications N/A     Are you in the first 12 months of your Medicare coverage?  No    Healthy Habits:     In general, how would you rate your overall health?  Good    Frequency of exercise:  None    Do you usually eat at least 4 servings of fruit and vegetables a day, include whole grains    & fiber and avoid regularly eating high fat or \"junk\" foods?  No    Taking medications regularly:  Yes    Medication side effects:  None    Ability to successfully perform activities of daily living:  Telephone requires assistance, housework requires assistance, bathing requires assistance, medication administration requires assistance and money management requires assistance    Home Safety:  No safety concerns identified    Hearing Impairment:  No hearing concerns    In the past 6 months, have you been bothered by leaking of urine? Yes    In general, how would you rate your overall mental or emotional health?  Good    Additional concerns today:  No        Have you ever done Advance Care Planning? (For example, a Health Directive, POLST, or a discussion with a medical provider or your loved ones about your wishes): Yes, patient states has an Advance Care Planning document and will bring a copy to the clinic.       Fall risk  Fallen 2 or more times in the past year?: No  Any fall with injury in the past year?: No    Cognitive Screening   1) Repeat 3 items (Leader, Season, Table)    2) Clock draw: NORMAL  3) 3 item recall: Recalls 2 objects   Results: NORMAL clock, 1-2 items recalled: COGNITIVE IMPAIRMENT LESS LIKELY    Mini-CogTM Copyright ROBI Lira. Licensed by the author for use in [facility]; reprinted with permission (teresa@.[facility]). " All rights reserved.      Reviewed and updated as needed this visit by clinical staff   Tobacco  Allergies  Meds              Reviewed and updated as needed this visit by Provider                 Social History     Tobacco Use     Smoking status: Never     Smokeless tobacco: Never   Substance Use Topics     Alcohol use: Yes     Comment: Alcoholic Drinks/day: rare           7/21/2023    10:26 AM   Alcohol Use   Prescreen: >3 drinks/day or >7 drinks/week? Not Applicable   Do you have a current opioid prescription? No  Do you use any other controlled substances or medications that are not prescribed by a provider? None    Current providers sharing in care for this patient include:   Patient Care Team:  Cody Pacheco MD as PCP - General  Cody Pacheco MD as Assigned PCP    The following health maintenance items are reviewed in Epic and correct as of today:  Health Maintenance   Topic Date Due     ZOSTER IMMUNIZATION (1 of 2) Never done     DTAP/TDAP/TD IMMUNIZATION (2 - Td or Tdap) 01/16/2018     A1C  09/25/2022     COVID-19 Vaccine (6 - Pfizer series) 02/15/2023     MEDICARE ANNUAL WELLNESS VISIT  03/25/2023     BMP  03/25/2023     LIPID  03/25/2023     DIABETIC FOOT EXAM  03/25/2023     ANNUAL REVIEW OF HM ORDERS  03/25/2023     HEMOGLOBIN  03/25/2023     INFLUENZA VACCINE (1) 09/01/2023     EYE EXAM  06/20/2024     FALL RISK ASSESSMENT  07/21/2024     ADVANCE CARE PLANNING  03/25/2027     COLORECTAL CANCER SCREENING  06/11/2029     PHQ-2 (once per calendar year)  Completed     Pneumococcal Vaccine: 65+ Years  Completed     URINALYSIS  Completed     IPV IMMUNIZATION  Aged Out     MENINGITIS IMMUNIZATION  Aged Out     MICROALBUMIN  Discontinued     HEPATITIS C SCREENING  Discontinued     AORTIC ANEURYSM SCREENING (SYSTEM ASSIGNED)  Discontinued     Review of Systems   Constitutional: Negative for chills and fever.   HENT: Positive for sore throat. Negative for congestion, ear pain and hearing loss.    Eyes:  "Negative for pain and visual disturbance.   Respiratory: Negative for cough and shortness of breath.    Cardiovascular: Positive for palpitations. Negative for chest pain and peripheral edema.   Gastrointestinal: Negative for abdominal pain, constipation, diarrhea, heartburn, hematochezia and nausea.   Genitourinary: Positive for urgency. Negative for dysuria, frequency, genital sores, hematuria, impotence and penile discharge.   Musculoskeletal: Positive for arthralgias. Negative for joint swelling and myalgias.   Skin: Negative for rash.   Neurological: Negative for dizziness, weakness, headaches and paresthesias.   Psychiatric/Behavioral: Negative for mood changes. The patient is nervous/anxious.        OBJECTIVE:   There were no vitals taken for this visit. Estimated body mass index is 35.38 kg/m  as calculated from the following:    Height as of 3/25/22: 1.676 m (5' 6\").    Weight as of 3/25/22: 99.4 kg (219 lb 3.2 oz).  Physical Exam  EYES: Eyelids, conjunctiva, and sclera were normal. Pupils were normal. Cornea, iris, and lens were normal bilaterally.  HEAD, EARS, NOSE, MOUTH, AND THROAT: Head and face were normal. Hearing was normal to voice and the ears were normal to external exam. Nose appearance was normal and there was no discharge. Oropharynx was normal.  NECK: Neck appearance was normal. There were no neck masses and the thyroid was not enlarged.  RESPIRATORY: Breathing pattern was normal and the chest moved symmetrically.  Percussion/auscultatory percussion was normal.  Lung sounds were normal and there were no abnormal sounds.  CARDIOVASCULAR: Heart rate and rhythm were normal.  S1 and S2 were normal and there were no extra sounds or murmurs. Peripheral pulses in arms and legs were normal.  Jugular venous pressure was normal.  There was no peripheral edema.  GASTROINTESTINAL: The abdomen was normal in contour.  Bowel sounds were present.  Percussion detected no organ enlargement or tenderness.  " Palpation detected no tenderness, mass, or enlarged organs.   MUSCULOSKELETAL: Skeletal configuration was normal and muscle mass was normal for age. Joint appearance was overall normal.  LYMPHATIC: There were no enlarged nodes.  SKIN/HAIR/NAILS: Skin color was normal.  There were no skin lesions.  Hair and nails were normal.  NEUROLOGIC: The patient was alert and oriented to person, place, time, and circumstance. Speech was normal. Cranial nerves were normal. Motor strength was normal for age. The patient was normally coordinated.  PSYCHIATRIC:  Mood and affect were normal and the patient had normal recent and remote memory. The patient's judgment and insight were normal.    ASSESSMENT / PLAN:   1. Annual physical exam  This is a 73-year-old man with issues as discussed below.    2. High priority for 2019-nCoV vaccine    3. Type 2 diabetes mellitus with both eyes affected by mild nonproliferative retinopathy without macular edema, without long-term current use of insulin (H)  Does not appear has been taking his medications.  He should resume metformin and we will have our clinical pharmacist and nurse care management team reach out to him to assist  - Comprehensive metabolic panel; Future  - Hemoglobin A1c; Future  - Albumin Random Urine Quantitative with Creat Ratio; Future  - Vitamin B12; Future  - Med Therapy Management Referral  - Primary Care - Care Coordination Referral; Future  - aspirin 81 MG EC tablet; Take 1 tablet (81 mg) by mouth daily  Dispense: 90 tablet; Refill: 4  - Comprehensive metabolic panel  - Hemoglobin A1c  - Albumin Random Urine Quantitative with Creat Ratio  - Vitamin B12    4. Diabetic polyneuropathy associated with type 2 diabetes mellitus (H)  Stable no diabetic foot ulcers    5. Stage 3a chronic kidney disease (H)  Needs to resume medications  - CBC with platelets; Future  - Med Therapy Management Referral  - Primary Care - Care Coordination Referral; Future  - CBC with platelets    6.  Essential hypertension  As above quite elevated follow-up 1 month once he is on his medications  - Med Therapy Management Referral  - Primary Care - Care Coordination Referral; Future  - amLODIPine (NORVASC) 10 MG tablet; Take 1 tablet (10 mg) by mouth daily  Dispense: 90 tablet; Refill: 4  - lisinopril-hydrochlorothiazide (ZESTORETIC) 20-25 MG tablet; Take 1 tablet by mouth daily  Dispense: 90 tablet; Refill: 4    7. Mixed hyperlipidemia  Resume statin  - Lipid panel reflex to direct LDL Fasting; Future  - TSH with free T4 reflex; Future  - Med Therapy Management Referral  - Primary Care - Care Coordination Referral; Future  - atorvastatin (LIPITOR) 20 MG tablet; Take 1 tablet (20 mg) by mouth daily  Dispense: 90 tablet; Refill: 4  - Lipid panel reflex to direct LDL Fasting  - TSH with free T4 reflex    8. Morbid obesity (H)  COUNSELING:  Reviewed preventive health counseling, as reflected in patient instructions  He reports that he has never smoked. He has never used smokeless tobacco.    ppropriate preventive services were discussed with this patient, including applicable screening as appropriate for cardiovascular disease, diabetes, osteopenia/osteoporosis, and glaucoma.  As appropriate for age/gender, discussed screening for colorectal cancer, prostate cancer, breast cancer, and cervical cancer. Checklist reviewing preventive services available has been given to the patient.    Reviewed patients plan of care and provided an AVS. The Basic Care Plan (routine screening as documented in Health Maintenance) for Chemo meets the Care Plan requirement. This Care Plan has been established and reviewed with the Patient.    Cody Pacheco MD  [facility]    Identified Health Risks:    I have reviewed Opioid Use Disorder and Substance Use Disorder risk factors and made any needed referrals.

## 2023-07-21 NOTE — LETTER
July 24, 2023      Chemo Casillas  [address] [place] [address]        Dear ,    We are writing to inform you of your test results.    Your labs generally look okay/stable, which is excellent.  We can discuss these in detail at your follow-up visit    Resulted Orders   CBC with platelets   Result Value Ref Range    WBC Count 7.6 4.0 - 11.0 10e3/uL    RBC Count 5.30 4.40 - 5.90 10e6/uL    Hemoglobin 15.9 13.3 - 17.7 g/dL    Hematocrit 45.4 40.0 - 53.0 %    MCV 86 78 - 100 fL    MCH 30.0 26.5 - 33.0 pg    MCHC 35.0 31.5 - 36.5 g/dL    RDW 12.5 10.0 - 15.0 %    Platelet Count 191 150 - 450 10e3/uL   Comprehensive metabolic panel   Result Value Ref Range    Sodium 140 136 - 145 mmol/L    Potassium 3.9 3.4 - 5.3 mmol/L    Chloride 104 98 - 107 mmol/L    Carbon Dioxide (CO2) 21 (L) 22 - 29 mmol/L    Anion Gap 15 7 - 15 mmol/L    Urea Nitrogen 13.9 8.0 - 23.0 mg/dL    Creatinine 0.88 0.67 - 1.17 mg/dL    Calcium 9.5 8.8 - 10.2 mg/dL    Glucose 147 (H) 70 - 99 mg/dL    Alkaline Phosphatase 62 40 - 129 U/L    AST 32 0 - 45 U/L      Comment:      Reference intervals for this test were updated on 6/12/2023 to more accurately reflect our healthy population. There may be differences in the flagging of prior results with similar values performed with this method. Interpretation of those prior results can be made in the context of the updated reference intervals.    ALT 40 0 - 70 U/L      Comment:      Reference intervals for this test were updated on 6/12/2023 to more accurately reflect our healthy population. There may be differences in the flagging of prior results with similar values performed with this method. Interpretation of those prior results can be made in the context of the updated reference intervals.      Protein Total 7.6 6.4 - 8.3 g/dL    Albumin 4.5 3.5 - 5.2 g/dL    Bilirubin Total 0.6 <=1.2 mg/dL    GFR Estimate 90 >60 mL/min/1.73m2   Hemoglobin A1c   Result Value Ref Range    Hemoglobin  A1C 7.1 (H) 0.0 - 5.6 %      Comment:      Normal <5.7%   Prediabetes 5.7-6.4%    Diabetes 6.5% or higher     Note: Adopted from ADA consensus guidelines.   Lipid panel reflex to direct LDL Fasting   Result Value Ref Range    Cholesterol 187 <200 mg/dL    Triglycerides 114 <150 mg/dL    Direct Measure HDL 42 >=40 mg/dL    LDL Cholesterol Calculated 122 (H) <=100 mg/dL    Non HDL Cholesterol 145 (H) <130 mg/dL    Narrative    Cholesterol  Desirable:  <200 mg/dL    Triglycerides  Normal:  Less than 150 mg/dL  Borderline High:  150-199 mg/dL  High:  200-499 mg/dL  Very High:  Greater than or equal to 500 mg/dL    Direct Measure HDL  Female:  Greater than or equal to 50 mg/dL   Male:  Greater than or equal to 40 mg/dL    LDL Cholesterol  Desirable:  <100mg/dL  Above Desirable:  100-129 mg/dL   Borderline High:  130-159 mg/dL   High:  160-189 mg/dL   Very High:  >= 190 mg/dL    Non HDL Cholesterol  Desirable:  130 mg/dL  Above Desirable:  130-159 mg/dL  Borderline High:  160-189 mg/dL  High:  190-219 mg/dL  Very High:  Greater than or equal to 220 mg/dL   TSH with free T4 reflex   Result Value Ref Range    TSH 2.90 0.30 - 4.20 uIU/mL   Albumin Random Urine Quantitative with Creat Ratio   Result Value Ref Range    Creatinine Urine mg/dL 249.0 mg/dL      Comment:      The reference ranges have not been established in urine creatinine. The results should be integrated into the clinical context for interpretation.    Albumin Urine mg/L 162.0 mg/L      Comment:      The reference ranges have not been established in urine albumin. The results should be integrated into the clinical context for interpretation.    Albumin Urine mg/g Cr 65.06 (H) 0.00 - 17.00 mg/g Cr      Comment:      Microalbuminuria is defined as an albumin:creatinine ratio of 17 to 299 for males and 25 to 299 for females. A ratio of albumin:creatinine of 300 or higher is indicative of overt proteinuria.  Due to biologic variability, positive results should be  confirmed by a second, first-morning random or 24-hour timed urine specimen. If there is discrepancy, a third specimen is recommended. When 2 out of 3 results are in the microalbuminuria range, this is evidence for incipient nephropathy and warrants increased efforts at glucose control, blood pressure control, and institution of therapy with an angiotensin-converting-enzyme (ACE) inhibitor (if the patient can tolerate it).     Vitamin B12   Result Value Ref Range    Vitamin B12 575 232 - 1,245 pg/mL       If you have any questions or concerns, please call the clinic at the number listed above.       Sincerely,      Cody Pacheco MD

## 2023-07-21 NOTE — PATIENT INSTRUCTIONS
Patient Education   Personalized Prevention Plan  You are due for the preventive services outlined below.  Your care team is available to assist you in scheduling these services.  If you have already completed any of these items, please share that information with your care team to update in your medical record.  Health Maintenance Due   Topic Date Due     Zoster (Shingles) Vaccine (1 of 2) Never done     Diptheria Tetanus Pertussis (DTAP/TDAP/TD) Vaccine (2 - Td or Tdap) 01/16/2018     Basic Metabolic Panel  03/25/2023     Cholesterol Lab  03/25/2023     ANNUAL REVIEW OF HM ORDERS  03/25/2023       Exercise for a Healthier Heart  You may wonder how you can improve the health of your heart. If you re thinking about exercise, you re on the right track. You don t need to become an athlete. But you do need a certain amount of brisk exercise to help strengthen your heart. If you have been diagnosed with a heart condition, your healthcare provider may advise exercise to help your condition. To help make exercise a habit, choose safe, fun activities.      Exercise with a friend. When activity is fun, you're more likely to stick with it.     Before you start  Check with your healthcare provider before starting an exercise program. This is especially important if you haven't been active for a while. It's also important if you have a long-term (chronic) health problem such as heart disease, diabetes, or obesity. Also check with your provider if you're at high risk for having these problems.   Why exercise?  Exercising regularly offers many healthy rewards. It can help you do all of these:     Improve your blood cholesterol level to help prevent further heart trouble.    Lower your blood pressure to help prevent a stroke or heart attack.    Control diabetes or reduce your risk of getting this disease.    Improve your heart and lung function.    Reach and stay at a healthy weight.    Make your muscles stronger so you can  stay active.    Prevent falls and fractures by slowing the loss of bone mass (osteoporosis).    Manage stress better.    Improve your sense of self and your body image.  Exercise tips      Ease into your routine. Set small goals. Then build on them. Talk with your healthcare provider first before starting an exercise routine if you're not sure what your activity level should be.    Exercise on most days. Aim for a total of at least 150 minutes (2 hours and 30 minutes) or more of moderate-intensity aerobic activity each week. You could also do 75 minutes (1 hour and 15 minutes) or more of vigorous-intensity aerobic activity each week. Or try for a combination of both. Moderate activity means that you breathe heavier and your heart rate increases, but you can still talk. Think about doing at least 30 minutes of moderate exercise, 5 times a week. It's OK to work up to the 30-minute period over time. Examples of moderate-intensity activity are brisk walking, gardening, and water aerobics.    Step up your daily activity level.  Along with your exercise program, try being more active the whole day. Walk instead of drive. Or park further away so that you take more steps each day. Do more household tasks or yard work. You may not be able to meet the advised amount of physical activity. But doing some moderate- or vigorous-intensity aerobic activity can help reduce your risk for heart disease. Your healthcare provider can help you figure out what is best for you.    Choose 1 or more activities you enjoy.  Walking is one of the easiest things you can do. You can also try swimming, riding a bike, dancing, or taking an exercise class.    Call 911  Call 911 right away if any of these occur:     Chest pain that doesn't go away quickly with rest    New burning, tightness, pressure, or heaviness in your chest, neck, shoulders, back, or arms    Abnormal or severe shortness of breath    A very fast or irregular heartbeat  "(palpitations)    Fainting  When to call your healthcare provider  Call your healthcare provider if you have any of these:     Dizziness or lightheadedness    Mild shortness of breath or chest pain    Increased or new joint or muscle pain    [employer] last reviewed this educational content on 7/1/2022 2000-2023 [employer]. All rights reserved. This information is not intended as a substitute for professional medical care. Always follow your healthcare professional's instructions.        Learning About Being Physically Active  What is physical activity?     Being physically active means doing any kind of activity that gets your body moving.  The types of physical activity that can help you get fit and stay healthy include:    Aerobic or \"cardio\" activities. These make your heart beat faster and make you breathe harder, such as brisk walking, riding a bike, or running. They strengthen your heart and lungs and build up your endurance.    Strength training activities. These make your muscles work against, or \"resist,\" something. Examples include lifting weights or doing push-ups. These activities help tone and strengthen your muscles and bones.    Stretches. These let you move your joints and muscles through their full range of motion. Stretching helps you be more flexible.  Reaching a balance between these three types of physical activity is important because each one contributes to your overall fitness.  What are the benefits of being active?  Being active is one of the best things you can do for your health. It helps you to:    Feel stronger and have more energy to do all the things you like to do.    Focus better at school or work.    Feel, think, and sleep better.    Reach and stay at a healthy weight.    Lose fat and build lean muscle.    Lower your risk for serious health problems, including diabetes, heart attack, high blood pressure, and some cancers.    Keep your heart, lungs, bones, muscles, " "and joints strong and healthy.  How can you make being active part of your life?  Start slowly. Make it your long-term goal to get at least 30 minutes of exercise on most days of the week. Walking is a good choice. You also may want to do other activities, such as running, swimming, cycling, or playing tennis or team sports.  Pick activities that you like--ones that make your heart beat faster, your muscles stronger, and your muscles and joints more flexible. If you find more than one thing you like doing, do them all. You don't have to do the same thing every day.  Get your heart pumping every day. Any activity that makes your heart beat faster and keeps it at that rate for a while counts.  Here are some great ways to get your heart beating faster:    Go for a brisk walk, run, or bike ride.    Go for a hike or swim.    Go in-line skating.    Play a game of touch football, basketball, or soccer.    Ride a bike.    Play tennis or racquetball.    Climb stairs.  Even some household chores can be aerobic--just do them at a faster pace. Vacuuming, raking or mowing the lawn, sweeping the garage, and washing and waxing the car all can help get your heart rate up.  Strengthen your muscles during the week. You don't have to lift heavy weights or grow big, bulky muscles to get stronger. Doing a few simple activities that make your muscles work against, or \"resist,\" something can help you get stronger.  For example, you can:    Do push-ups or sit-ups, which use your own body weight as resistance.    Lift weights or dumbbells or use stretch bands at home or in a gym or community center.  Stretch your muscles often. Stretching will help you as you become more active. It can help you stay flexible, loosen tight muscles, and avoid injury. It can also help improve your balance and posture and can be a great way to relax.  Be sure to stretch the muscles you'll be using when you work out. It's best to warm your muscles slightly " "before you stretch them. Walk or do some other light aerobic activity for a few minutes, and then start stretching.  When you stretch your muscles:    Do it slowly. Stretching is not about going fast or making sudden movements.    Don't push or bounce during a stretch.    Hold each stretch for at least 15 to 30 seconds, if you can. You should feel a stretch in the muscle, but not pain.    Breathe out as you do the stretch. Then breathe in as you hold the stretch. Don't hold your breath.  If you're worried about how more activity might affect your health, have a checkup before you start. Follow any special advice your doctor gives you for getting a smart start.  Where can you learn more?  Go to https://www.[employer].net/patiented  Enter W332 in the search box to learn more about \"Learning About Being Physically Active.\"  Current as of: October 10, 2022               Content Version: 13.7    2272-0287 [employer].   Care instructions adapted under license by your healthcare professional. If you have questions about a medical condition or this instruction, always ask your healthcare professional. [employer] disclaims any warranty or liability for your use of this information.      Learning About Dietary Guidelines  What are the Dietary Guidelines for Americans?     Dietary Guidelines for Americans provide tips for eating well and staying healthy. This helps reduce the risk for long-term (chronic) diseases.  These guidelines recommend that you:    Eat and drink the right amount for you. The U.[place] government's food guide is called MyPlate. It can help you make your own well-balanced eating plan.    Try to balance your eating with your activity. This helps you stay at a healthy weight.    Drink alcohol in moderation, if at all.    Limit foods high in salt, saturated fat, trans fat, and added sugar.  These guidelines are from the U.S. Department of Agriculture and the U.S. Department of Health " "and Human Services. They are updated every 5 years.  What is MyPlate?  MyPlate is the U.[place] government's food guide. It can help you make your own well-balanced eating plan. A balanced eating plan means that you eat enough, but not too much, and that your food gives you the nutrients you need to stay healthy.  MyPlate focuses on eating plenty of whole grains, fruits, and vegetables, and on limiting fat and sugar. It is available online at www.ChooseMyPlate.gov.  How can you get started?  If you're trying to eat healthier, you can slowly change your eating habits over time. You don't have to make big changes all at once. Start by adding one or two healthy foods to your meals each day.  Grains  Choose whole-grain breads and cereals and whole-wheat pasta and whole-grain crackers.  Vegetables  Eat a variety of vegetables every day. They have lots of nutrients and are part of a heart-healthy diet.  Fruits  Eat a variety of fruits every day. Fruits contain lots of nutrients. Choose fresh fruit instead of fruit juice.  Protein foods  Choose fish and lean poultry more often. Eat red meat and fried meats less often. Dried beans, tofu, and nuts are also good sources of protein.  Dairy  Choose low-fat or fat-free products from this food group. If you have problems digesting milk, try eating cheese or yogurt instead.  Fats and oils  Limit fats and oils if you're trying to cut calories. Choose healthy fats when you cook. These include canola oil and olive oil.  Where can you learn more?  Go to https://www.[employer].net/patiented  Enter D676 in the search box to learn more about \"Learning About Dietary Guidelines.\"  Current as of: March 1, 2023               Content Version: 13.7    8750-8609 [employer].   Care instructions adapted under license by your healthcare professional. If you have questions about a medical condition or this instruction, always ask your healthcare professional. [employer], [employer] " disclaims any warranty or liability for your use of this information.        Understanding USDA MyPlate  The USDA has guidelines to help you make healthy food choices. These are called MyPlate. MyPlate shows the food groups that make up healthy meals using the image of a place setting. Before you eat, think about the healthiest choices for what to put on your plate or in your cup or bowl. To learn more about building a healthy plate, visit www.myplate.gov.     The food groups    Fruits. Any fruit or 100% fruit juice counts as part of the Fruit Group. Fruits may be fresh, canned, frozen, or dried, and may be whole, cut-up, or pureed. Make 1/2 of your plate fruits and vegetables.    Vegetables. Any vegetable or 100% vegetable juice counts as a member of the Vegetable Group. Vegetables may be fresh, frozen, canned, or dried. They can be served raw or cooked and may be whole, cut-up, or mashed. Make 1/2 of your plate fruits and vegetables.    Grains. All foods made from grains are part of the Grains Group. These include wheat, rice, oats, cornmeal, and barley. Grains are often used to make foods such as bread, pasta, oatmeal, cereal, tortillas, and grits. Grains should be no more than 1/4 of your plate. At least half of your grains should be whole grains.    Protein. This group includes meat, poultry, seafood, beans and peas, eggs, processed soy products (such as tofu), nuts (including nut butters), and seeds. Make protein choices no more than 1/4 of your plate. Meat and poultry choices should be lean or low fat.    Dairy. The Dairy Group includes all fluid milk products and foods made from milk that contain calcium, such as yogurt and cheese. (Foods that have little calcium, such as cream, butter, and cream cheese, are not part of this group.) Most dairy choices should be low-fat or fat-free.  Things to limit  Eating healthy also means limiting these things in your diet:    Oils. Oils aren't a food group, but they do  contain essential nutrients. These are fats that are liquid at room temperature. Including small amounts of oils in your diet is fine and can even be good for you. But it's important to watch how much oil you include in your diet. Oils include avocado, canola, corn, olive, soybean, vegetable, and sunflower. Foods that are mainly oil include mayonnaise, certain salad dressings, and soft margarines. You likely already get your daily oil allowance from the foods you eat.    Salt (sodium).  Many processed foods have a lot of sodium. To keep sodium intake down, eat fresh vegetables, meats, poultry, and seafood when possible. Buy low-sodium, reduced-sodium, or no-salt-added food products at the store. And don't add salt to your meals at home. Instead, season them with herbs and spices such as dill, oregano, cumin, and paprika. Or try adding flavor with vinegar, onion, garlic, or lemon or lime zest and juice.    Saturated fat . Saturated fats are most often found in animal products such as beef, pork, and chicken. They are often solid at room temperature, such as butter. To reduce your saturated fat intake, choose leaner cuts of meat and poultry. And try healthier cooking methods such as grilling, broiling, roasting, or baking. For a simple lower-fat swap, use plain nonfat yogurt instead of mayonnaise when making potato salad or macaroni salad.    Added sugars.  These are sugars added to foods. They are in foods such as ice cream, candy, soda, fruit drinks, sports drinks, energy drinks, cookies, pastries, jams, and syrups. Cut down on added sugars by sharing sweet treats with a family member or friend. You can also choose fruit for dessert, and drink water or other unsweetened beverages.    Alcohol. Alcohol contains calories but few nutrients. If you don't drink alcohol, don't start drinking for any reason. But if you do choose to drink alcohol, do so only in moderation. This means no more than 2 drinks in a day for men  and no more than 1 drink per day for women, on days when you have alcohol.  [employer] last reviewed this educational content on 1/1/2023 2000-2023 [employer]. All rights reserved. This information is not intended as a substitute for professional medical care. Always follow your healthcare professional's instructions.        Activities of Daily Living    Your Health Risk Assessment indicates you have difficulties with activities of daily living such as housework, bathing, preparing meals, taking medication, etc. Please make a follow up appointment for us to address this issue in more detail.  Bladder Training: Care Instructions  Your Care Instructions     Bladder training is used to treat urge incontinence and stress incontinence. Urge incontinence means that the need to urinate comes on so fast that you can't get to a toilet in time. Stress incontinence means that you leak urine because of pressure on your bladder. For example, it may happen when you laugh, cough, or lift something heavy.  Bladder training can increase how long you can wait before you have to urinate. It can also help your bladder hold more urine. And it can give you better control over the urge to urinate.  It is important to remember that bladder training takes a few weeks to a few months to make a difference. You may not see results right away, but don't give up.  Follow-up care is a key part of your treatment and safety. Be sure to make and go to all appointments, and call your doctor if you are having problems. It's also a good idea to know your test results and keep a list of the medicines you take.  How can you care for yourself at home?  Work with your doctor to come up with a bladder training program that is right for you. You may use one or more of the following methods.  Delayed urination    In the beginning, try to keep from urinating for 5 minutes after you first feel the need to go.    While you wait, take deep, slow  "breaths to relax. Kegel exercises can also help you delay the need to go to the bathroom.    After some practice, when you can easily wait 5 minutes to urinate, try to wait 10 minutes before you urinate.    Slowly increase the waiting period until you are able to control when you have to urinate.  Scheduled urination    Empty your bladder when you first wake up in the morning.    Schedule times throughout the day when you will urinate.    Start by going to the bathroom every hour, even if you don't need to go.    Slowly increase the time between trips to the bathroom.    When you have found a schedule that works well for you, keep doing it.    If you wake up during the night and have to urinate, do it. Apply your schedule to waking hours only.  Kegel exercises  These tighten and strengthen pelvic muscles, which can help you control the flow of urine. (If doing these exercises causes pain, stop doing them and talk with your doctor.) To do Kegel exercises:    Squeeze your muscles as if you were trying not to pass gas. Or squeeze your muscles as if you were stopping the flow of urine. Your belly, legs, and buttocks shouldn't move.    Hold the squeeze for 3 seconds, then relax for 5 to 10 seconds.    Start with 3 seconds, then add 1 second each week until you are able to squeeze for 10 seconds.    Repeat the exercise 10 times a session. Do 3 to 8 sessions a day.  When should you call for help?  Watch closely for changes in your health, and be sure to contact your doctor if:      Your incontinence is getting worse.       You do not get better as expected.   Where can you learn more?  Go to https://www.[facility] [place].net/patiented  Enter V684 in the search box to learn more about \"Bladder Training: Care Instructions.\"  Current as of: March 1, 2023               Content Version: 13.7    6818-8130 [employer].   Care instructions adapted under license by your healthcare professional. If you have questions about a " medical condition or this instruction, always ask your healthcare professional. [employer] disclaims any warranty or liability for your use of this information.        Urinary Incontinence (Male)  We understand that gender is a spectrum. We may use gendered terms to talk about anatomy and health risk. Please use this sheet in a way that works best for you and your provider as you talk about your care.     Urinary incontinence means not being able to control the release of urine from the bladder.   Causes  Common causes of urinary incontinence in men include:    Infection    Certain medicines    Aging    Poor pelvic muscle tone    Bladder spasms    Obesity    Enlarged prostate    Trouble urinating and fully emptying the bladder (urinary retention)  Other things that can cause incontinence are:     Nervous system diseases    Diabetes    Sleep apnea    Urinary tract infections    Prostate surgery    Pelvic injury  Constipation and smoking have also been identified as risk factors.   Symptoms    Urge incontinence (overactive bladder). This is a sudden urge to urinate. It occurs even though there may not be much urine in the bladder. The need to urinate often during the night is common. It's due to overactive bladder muscles.    Stress incontinence. This is urine leakage that you can't control. It can occur with sneezing, coughing, and other actions that put stress on the bladder.    Treatment  Treatment depends on what is causing the condition. Bladder infections are treated with antibiotics. Urinary retention is treated with a bladder catheter.   Home care  Follow these guidelines when caring for yourself at home:    Don't have any foods and drinks that may irritate the bladder. This includes:  ? Chocolate  ? Alcohol  ? Caffeine  ? Carbonated drinks  ? Acidic fruits and juices    Limit fluids to 6 to 8 cups a day.    Lose weight if you are overweight. This may reduce your symptoms.    If advised, do  regular pelvic muscle-strengthening exercises such as Kegel exercises.    If needed, wear absorbent pads to catch urine. Change the pads often. This is for good hygiene and to prevent skin and bladder infections.    Bathe daily for good hygiene.    If an antibiotic was prescribed to treat a bladder infection, take it until it's finished. Keep taking it even if you are feeling better. This is to make sure your infection has cleared.    If a catheter was left in place, keep bacteria from getting into the collection bag. Don't disconnect the catheter from the collection bag.    Use a leg band to secure the catheter drainage tube, so it does not pull on the catheter. Drain the collection bag when it becomes full. To do this, use the drain spout at the bottom of the bag. Don't disconnect the bag from the catheter.    Don't pull on or try to remove a catheter. The catheter must be removed by a healthcare provider.    If you smoke, stop. Ask your provider for help if you can't do this on your own.  Follow-up care  Follow up with your healthcare provider, or as advised.  When to get medical advice  Call your healthcare provider right away if any of these occur:     Fever over 100.4 F (38 C), or as directed by your provider    Bladder pain or fullness    Belly swelling, nausea, or vomiting    Back pain    Weakness, dizziness, or fainting    If a catheter was left in place, return if:  ? The catheter falls out  ? The catheter stops draining for 6 hours  ? Your urine gets cloudy or smells bad  [employer] last reviewed this educational content on 6/1/2022 2000-2022 [employer]. All rights reserved. This information is not intended as a substitute for professional medical care. Always follow your healthcare professional's instructions.

## 2023-07-24 ENCOUNTER — PATIENT OUTREACH (OUTPATIENT)
Dept: CARE COORDINATION | Facility: CLINIC | Age: 73
End: 2023-07-24
Payer: MEDICARE

## 2023-07-24 NOTE — PROGRESS NOTES
Clinic Care Coordination Contact  [facility]/Voicemail       Clinical Data: Care Coordinator Outreach  Outreach attempted x 1.  No answer at time of CHW call today, no voicemail has been set up for patient's number-CHW unable to leave message  Plan: Care Coordinator CHW to discuss recent CC referral placed by PCP  Care Coordinator will try to reach patient again in 1-2 business days= 7/25/2023    Order Questions    Question Answer   Reason for Referral: Patient/Caregiver Support   Patient/Caregiver Suport: Resources for Support   Clinical Staff have discussed the Care Coordination Referral with the patient and/or caregiver: Yes       Glenis ROSENBAUM  Community Health Worker  [facility] Care Coordination  Kat Estrella [place] [email or web address]  Office: [phone]

## 2023-07-25 ENCOUNTER — TELEPHONE (OUTPATIENT)
Dept: INTERNAL MEDICINE | Facility: CLINIC | Age: 73
End: 2023-07-25
Payer: MEDICARE

## 2023-07-25 ENCOUNTER — PATIENT OUTREACH (OUTPATIENT)
Dept: CARE COORDINATION | Facility: CLINIC | Age: 73
End: 2023-07-25
Payer: MEDICARE

## 2023-07-25 NOTE — TELEPHONE ENCOUNTER
MTM referral from: [facility] visit (referral by provider)    MTM referral outreach attempt on July 25, 2023 at 10:17 AM      Outcome: Patient ask to call Suzy for scheduling MTM appointment. Suzy stated would like to schedule appointment with  and MTM on same day. Would like to wait off on scheduling, will route to MTM Pharmacist/Provider as an FYI. Thank you for the referral.     Use VBC for the carrier/Plan on the flowsheet    JADON Matthews

## 2023-07-25 NOTE — PROGRESS NOTES
Clinic Care Coordination Contact  Community Health Worker Initial Outreach    CHW Initial Information Gathering:  Referral Source: PCP  Preferred Urgent Care: [facility] [place], [phone]  Current living arrangement:: I live in a private home  Type of residence:: Private home - stairs  Community Resources: None  Informal Support system:: Family, Friends  No PCP office visit in Past Year: No  Transportation means:: Regular car, Family, Friend  CHW Additional Questions  If ED/Hospital discharge, follow-up appointment scheduled as recommended?: N/A  Medication changes made following ED/Hospital discharge?: N/A  MyChart active?: No  Patient agreeable to assistance with activating MyChart?: No    Patient accepts CC: Yes. Patient scheduled for assessment with CC SW on Thursday 7/27/2023 at 1:30 pm. Patient noted desire to discuss Recent CC referral placed by PCP- Call Suzy (Sister in law- Consent to communicate on file).     Order Questions     Question Answer   Reason for Referral: Patient/Caregiver Support   Patient/Caregiver Suport: Resources for Support   Clinical Staff have discussed the Care Coordination Referral with the patient and/or caregiver: Yes     Glenis ROSENBAUM  Community Health Worker  [facility] Care Coordination  [email or web address]  [email or web address]  Office: [phone]

## 2023-07-27 ENCOUNTER — PATIENT OUTREACH (OUTPATIENT)
Dept: NURSING | Facility: CLINIC | Age: 73
End: 2023-07-27
Payer: MEDICARE

## 2023-07-27 SDOH — ECONOMIC STABILITY: FOOD INSECURITY: WITHIN THE PAST 12 MONTHS, THE FOOD YOU BOUGHT JUST DIDN'T LAST AND YOU DIDN'T HAVE MONEY TO GET MORE.: NEVER TRUE

## 2023-07-27 SDOH — ECONOMIC STABILITY: TRANSPORTATION INSECURITY
IN THE PAST 12 MONTHS, HAS LACK OF TRANSPORTATION KEPT YOU FROM MEETINGS, WORK, OR FROM GETTING THINGS NEEDED FOR DAILY LIVING?: NO

## 2023-07-27 SDOH — ECONOMIC STABILITY: INCOME INSECURITY: IN THE LAST 12 MONTHS, WAS THERE A TIME WHEN YOU WERE NOT ABLE TO PAY THE MORTGAGE OR RENT ON TIME?: NO

## 2023-07-27 SDOH — ECONOMIC STABILITY: FOOD INSECURITY: WITHIN THE PAST 12 MONTHS, YOU WORRIED THAT YOUR FOOD WOULD RUN OUT BEFORE YOU GOT MONEY TO BUY MORE.: NEVER TRUE

## 2023-07-27 SDOH — ECONOMIC STABILITY: TRANSPORTATION INSECURITY
IN THE PAST 12 MONTHS, HAS THE LACK OF TRANSPORTATION KEPT YOU FROM MEDICAL APPOINTMENTS OR FROM GETTING MEDICATIONS?: NO

## 2023-07-27 ASSESSMENT — ACTIVITIES OF DAILY LIVING (ADL): DEPENDENT_IADLS:: TRANSPORTATION;MEDICATION MANAGEMENT

## 2023-07-27 ASSESSMENT — SOCIAL DETERMINANTS OF HEALTH (SDOH): HOW HARD IS IT FOR YOU TO PAY FOR THE VERY BASICS LIKE FOOD, HOUSING, MEDICAL CARE, AND HEATING?: NOT HARD AT ALL

## 2023-07-27 NOTE — Clinical Note
Prasad Bose, sister in law is going to start setting up his meds and will call if she needs assistance.  Thanks, Sparkle

## 2023-07-27 NOTE — PROGRESS NOTES
Clinic Care Coordination Contact  Clinic Care Coordination Contact  OUTREACH    Referral Information:  Referral Source: PCP    Primary Diagnosis: Psychosocial    Chief Complaint   Patient presents with    Clinic Care Coordination - Initial        Universal Utilization: has not been getting health maintenance.    Clinic Utilization  Difficulty keeping appointments:: No  Compliance Concerns: No  No-Show Concerns: No  No PCP office visit in Past Year: No  Utilization      Hospital Admissions  0             ED Visits  0             No Show Count (past year)  0                    Current as of: 7/25/2023  5:12 PM              Assessment completed with  in law Almonte, on consent to communicate.   Clinical Concerns:  Current Medical Concerns:  73 year old, has Diabetes type 2, has not taken his medications for over a year.  Recent PCP appt.  Hypertension.,   Current Behavioral Concerns: none noted.   Education Provided to patient: reviewed role of care coordination, in home supports, county supports, creating caregiving team.   Pain  Pain (GOAL):: No  Health Maintenance Reviewed: Due/Overdue   Health Maintenance Due   Topic Date Due    ZOSTER IMMUNIZATION (1 of 2) Never done    DTAP/TDAP/TD IMMUNIZATION (2 - Td or Tdap) 01/16/2018       Clinical Pathway: None    Medication Management:  Medication review status: Medications reviewed and no changes reported per patient.        Sister in  worked with [employer] and is getting his medications sent to her house. She will ask him to come over and she will set up his pill boxes to see if he can start taking his meds accurately again.  He used to do it, and she will follow up about blood sugar testing which he used to do as well.     Functional Status:  Dependent ADLs:: Independent, Ambulation-no assistive device  Dependent IADLs:: Transportation, Medication Management  Bed or wheelchair confined:: No  Mobility Status: Independent  Fallen 2 or more times in the past  year?: No  Any fall with injury in the past year?: No    Living Situation:  Current living arrangement:: I live in a private home  Type of residence:: Private home - stairs    Lifestyle & Psychosocial Needs:    Social Determinants of Health     Tobacco Use: Low Risk  (2023)    Patient History     Smoking Tobacco Use: Never     Smokeless Tobacco Use: Never     Passive Exposure: Not on file   Alcohol Use: Not on file   Financial Resource Strain: Low Risk  (2023)    Overall Financial Resource Strain (CARDIA)     Difficulty of Paying Living Expenses: Not hard at all   Food Insecurity: No Food Insecurity (2023)    Hunger Vital Sign     Worried About Running Out of Food in the Last Year: Never true     Ran Out of Food in the Last Year: Never true   Transportation Needs: No Transportation Needs (2023)    PRAPARE - Transportation     Lack of Transportation (Medical): No     Lack of Transportation (Non-Medical): No   Physical Activity: Not on file   Stress: Not on file   Social Connections: Not on file   Intimate Partner Violence: Not on file   Depression: Not at risk (2023)    PHQ-2     PHQ-2 Score: 0   Housing Stability: Low Risk  (2023)    Housing Stability Vital Sign     Unable to Pay for Housing in the Last Year: No     Number of Places Lived in the Last Year: 1     Unstable Housing in the Last Year: No     Diet:: Regular  Inadequate nutrition (GOAL):: No  Tube Feeding: No  Inadequate activity/exercise (GOAL):: No  Significant changes in sleep pattern (GOAL): No  Transportation means:: Regular car, Family, Friend     Methodist or spiritual beliefs that impact treatment:: No  Mental health DX:: No  Mental health management concern (GOAL):: No  Chemical Dependency Status: No Current Concerns  Informal Support system:: Family, Friends     Suzy is patient's sister in law, her , who is , was his brother.  Suzy has been his caregiver as needed for medical appointments.  He is now  needing a lot of support and she is feeling overwhelmed.  Before covid, they would see him socially and go to lunch and vacations.  During covid, it was much more limited.  Patient has lived in his family home his whole life and his mother  7 years ago.  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  He has no savings and limited income. Does have a car that he drives very short distances.  He is paying his own bills, though Suzy is his POA.      He had a neuro psych exam at [facility] in 2017 and she thinks he should have another one to see if he has progressed.  The initial exam showed he had a stroke at birth and that he may have dementia as he aged.  He is not caring for his hygiene, his house is not clean, dishes in the sink, dirty clothes, watches TV all day and doesn't leave his home, drapes drawn.  Suzy's daughter and Suzy's sister have also been involved a bit with his care and they think he should move to assisted living.  He is not losing weight, though she is unsure what he is eating. He denied wanting meals on wheels.  When she brings up getting help at home, he doesn't say anything.    Discussed when family can intervene and that they want to honor his independence as much as possible.      Suzy was very appreciative of the information and will proceed with the goals identified. She will also talk to her family about getting help with his needs.  They recently set up My Chart for him.        Resources and Interventions:  Current Resources:      Community Resources: None  Supplies Currently Used at Home: Diabetic Supplies  Equipment Currently Used at Home: none  Employment Status: retired         Advance Care Plan/Directive  Advanced Care Plans/Directives on file:: No  Advanced Care Plan/Directive Status: Declined Further Information    Referrals Placed: [facility] ([place] Cho[place])       Care Plan:  Care Plan: Help At Home       Problem: Insufficient In-home support       Long-Range  Goal: i will live safely in my home with support as needed.       Start Date: 7/27/2023 Expected End Date: 7/26/2024    Priority: High    Note:     Barriers: may not recognize what is needed.   Strengths: family support.   Patient expressed understanding of goal: sister in law does  Action steps to achieve this goal:  1. Sister in law will talk to patient about supports to help at home.   2. Sister in law will arrange for supports if he is willing.  3. Sister hollis cook will report progress towards this goal at scheduled outreach telephone calls from the CCC team.                                Care Plan: Medication Regimen       Problem: Medication Adherence       Long-Range Goal: Consistently take Medications as Prescribed       Start Date: 7/27/2023 Expected End Date: 7/26/2024    Priority: High    Note:     Barriers: has not been taking meds for over a year.   Strengths: family can set up meds in pill boxes and monitor.  Patient expressed understanding of goal: sister in law does.  Action steps to achieve this goal:  1. Sister in law will work with pharmacy to get medications sent to her home.   2. Sister in law will set up pill boxes with patient.  3. Sister hollis cook will monitor and work with [facility] pharmacist as needed.  4. Sister hollis cook will report progress towards this goal at scheduled outreach telephone calls from the CCC team.                                Care Plan: Specialty Referral       Problem: Patient is in need of specialty care       Goal: I will have neuro-psych appt to assess my cognition.       Start Date: 7/27/2023 Expected End Date: 1/1/2024    Priority: Medium    Note:     Barriers:none noted.   Strengths: have had one done before and can hopefully go back to same clinic.  Family support.   Patient expressed understanding of goal: sister in law does.  Action steps to achieve this goal:  1. Sister in law will schedule appt.and will bring him to appt.   2. Sister hollis cook will report progress towards  this goal at scheduled outreach telephone calls from the CCC team.                                  Care Plan: Community Resources       Problem: Lack of transportation               Problem: Insufficient In-home support       Long-Range Goal: [place] Cho[place] to see if I can get in home supports.       Start Date: 7/27/2023 Expected End Date: 7/26/2024    Priority: Medium    Note:     Barriers: none noted  Strengths: family can help.   Patient expressed understanding of goal: sister in law set up.  Action steps to achieve this goal:  1. Sister in law will [place] and be with patient at assessment.  2. Sister in law will help complete all paper work.  3. Sister in law will report progress towards this goal at scheduled outreach telephone calls from the CCC team.                                  Patient/Caregiver understanding: Suzy enrolled in care coordination.      Outreach Frequency: monthly  Future Appointments                In 3 weeks Cody Pacheco [facility]            Plan: CCC SW will continue to monitor, support patient with current goals and will be available to assist as needs arise. CCC CHW will reach out to patient on a monthly basis to discuss progression of goals.      CCC SW will perform Chart Review in 45 days.

## 2023-07-27 NOTE — LETTER
[facility]  Patient Centered Plan of Care  About Me:        Patient Name:  Chemo Casillas    YOB: 1950  Age:         73 year old   Sara MRN:    [id number] Telephone Information:  Home Phone [phone]   Mobile [phone]       Address:  [address] [place] [address] Email address:  [email or web address]      Emergency Contact(s)    Name Relationship Lgl Grd Work Phone Home Phone Mobile Phone   1. SHRAVAN CLINE Sister-in-Law   [phone]           Primary language:  English     needed? No   [place] [employer] [facility]:  [phone] op. 1  Other communication barriers:Access to technology    Preferred Method of Communication:     Current living arrangement: I live in a private home    Mobility Status/ Medical Equipment: Independent        Health Maintenance  Health Maintenance Reviewed: Due/Overdue   Health Maintenance Due   Topic Date Due    ZOSTER IMMUNIZATION (1 of 2) Never done    DTAP/TDAP/TD IMMUNIZATION (2 - Td or Tdap) 01/16/2018           My Access Plan  Medical Emergency 911   Primary Clinic Line [facility] [phone]   24 Hour Appointment Line [phone] or  [phone]) (toll-free)   24 Hour Nurse Line [phone] (toll-free)   Preferred Urgent Care [facility] [phone]     Preferred Hospital [facility]  [phone]     Preferred Pharmacy [id number] [place] [id number] AT [facility] [address]     Behavioral Health Crisis Line The National Suicide Prevention Lifeline at 1-272.268.2475 or Text/Call 478             My Care Team Members  Patient Care Team         Relationship Specialty Notifications Start End    Cody Pacheco MD PCP - General   2/10/16     Phone: [phone] Fax: [phone]         [address] [facility] [address]    Cody Pacheco MD Assigned PCP   4/3/22     Phone: [phone]  Fax: [phone]         [address] [facility] [address]    Sparkle Salazar LSW Lead Care Coordinator Primary Care - CC Admissions 7/25/23     Phone: [phone]         Farrah Perea Community Health Worker  Admissions 7/27/23               My Care Plans  Self Management and Treatment Plan  Care Plan  Care Plan: Help At Home       Problem: Insufficient In-home support       Long-Range Goal: i will live safely in my home with support as needed.       Start Date: 7/27/2023 Expected End Date: 7/26/2024    Priority: High    Note:     Barriers: may not recognize what is needed.   Strengths: family support.   Patient expressed understanding of goal: sister in law does  Action steps to achieve this goal:  1. Sister in law will talk to patient about supports to help at home.   2. Sister in law will arrange for supports if he is willing.  3. Sister hollis cook will report progress towards this goal at scheduled outreach telephone calls from the CCC team.                                Care Plan: Medication Regimen       Problem: Medication Adherence       Long-Range Goal: Consistently take Medications as Prescribed       Start Date: 7/27/2023 Expected End Date: 7/26/2024    Priority: High    Note:     Barriers: has not been taking meds for over a year.   Strengths: family can set up meds in pill boxes and monitor.  Patient expressed understanding of goal: sister in law does.  Action steps to achieve this goal:  1. Sister in law will work with pharmacy to get medications sent to her home.   2. Sister in law will set up pill boxes with patient.  3. Sister hollis cook will monitor and work with MTM pharmacist as needed.  4. Sister hollis cook will report progress towards this goal at scheduled outreach telephone calls from the CCC team.                                Care Plan: Specialty Referral       Problem: Patient is in need of specialty care       Goal: I will have neuro-psych appt to assess my cognition.       Start Date:  7/27/2023 Expected End Date: 1/1/2024    Priority: Medium    Note:     Barriers:none noted.   Strengths: have had one done before and can hopefully go back to same clinic.  Family support.   Patient expressed understanding of goal: sister in law does.  Action steps to achieve this goal:  1. Sister in law will schedule appt.and will bring him to appt.   2. Sister hollis cook will report progress towards this goal at scheduled outreach telephone calls from the CCC team.                                  Care Plan: Community Resources       Problem: Lack of transportation               Problem: Insufficient In-home support       Long-Range Goal: [place] if I can get in home supports.       Start Date: 7/27/2023 Expected End Date: 7/26/2024    Priority: Medium    Note:     Barriers: none noted  Strengths: family can help.   Patient expressed understanding of goal: sister in law set up.  Action steps to achieve this goal:  1. Sister in law will [place] and be with patient at assessment.  2. Sister hollis cook will help complete all paper work.  3. Sister hollis cook will report progress towards this goal at scheduled outreach telephone calls from the CCC team.                                       Advance Care Plans/Directives Type:   No data recorded    My Medical and Care Information  Problem List   Patient Active Problem List   Diagnosis    Essential hypertension    Type 2 diabetes mellitus with both eyes affected by mild nonproliferative retinopathy without macular edema, without long-term current use of insulin (H)    Mixed hyperlipidemia    Stage 3a chronic kidney disease (H)    Morbid obesity (H)    Diabetic polyneuropathy associated with type 2 diabetes mellitus (H)      Current Medications and Allergies:    Current Outpatient Medications   Medication Instructions    amLODIPine (NORVASC) 10 mg, Oral, DAILY    aspirin 81 mg, Oral, DAILY    atorvastatin (LIPITOR) 20 mg, Oral,  DAILY    blood glucose test (CONTOUR NEXT TEST STRIPS) strips [BLOOD GLUCOSE TEST (CONTOUR NEXT TEST STRIPS) STRIPS] Test each day as directed.    generic lancets [GENERIC LANCETS] Test each day as directed.    lisinopril-hydrochlorothiazide (ZESTORETIC) 20-25 MG tablet 1 tablet, Oral, DAILY    metFORMIN (GLUCOPHAGE) 1,000 mg, Oral, 2 TIMES DAILY WITH MEALS         Care Coordination Start Date: 7/21/2023   Frequency of Care Coordination: monthly     Form Last Updated: 07/27/2023

## 2023-07-27 NOTE — LETTER
[facility] CARE COORDINATION  [facility]  July 27, 2023    Chemo Casillas  [address]  [place] [address]      Dear Chemo/Suzy,        I am a  clinic care coordinator who works with Cody Pacheco MD with the [facility]. I wanted to thank you for spending the time to talk with me.  Below is a description of clinic care coordination and how I can further assist you.       The clinic care coordination team is made up of a registered nurse, , financial resource worker and community health worker who understand the health care system. The goal of clinic care coordination is to help you manage your health and improve access to the health care system. Our team works alongside your provider to assist you in determining your health and social needs. We can help you obtain health care and community resources, providing you with necessary information and education. We can work with you through any barriers and develop a care plan that helps coordinate and strengthen the communication between you and your care team.  Our services are voluntary and are offered without charge to you personally.    Please feel free to contact me with any questions or concerns regarding care coordination and what we can offer.      We are focused on providing you with the highest-quality healthcare experience possible.    Sincerely,     Sparkle Salazar,   [facility]  [phone]      Enclosed: I have enclosed a copy of the Patient Centered Plan of Care. This has helpful information and goals that we have talked about. Please keep this in an easy to access place to use as needed.

## 2023-07-28 ENCOUNTER — MYC MEDICAL ADVICE (OUTPATIENT)
Dept: INTERNAL MEDICINE | Facility: CLINIC | Age: 73
End: 2023-07-28
Payer: MEDICARE

## 2023-07-28 DIAGNOSIS — R41.89 COGNITIVE IMPAIRMENT: Primary | ICD-10-CM

## 2023-08-07 NOTE — TELEPHONE ENCOUNTER
7/27/23 Social Work note    He had a neuro psych exam at [facility] in 2017 and she thinks he should have another one to see if he has progressed. The initial exam showed he had a stroke at birth and that he may have dementia as he aged. He is not caring for his hygiene, his house is not clean, dishes in the sink, dirty clothes, watches TV all day and doesn't leave his home, drapes drawn. Suzy's daughter and Suzy's sister have also been involved a bit with his care and they think he should move to assisted living. He is not losing weight, though she is unsure what he is eating. He denied wanting meals on wheels. When she brings up getting help at home, he doesn't say anything.

## 2023-08-09 NOTE — PROGRESS NOTES
"In person evaluation      [facility]  8/11/2023, in person consultation      73-year-old being evaluated neurologically for:  Memory loss  Watershed had stroke as an infant  Decreased cognitive ability chronic      A.  Memory loss       History of neuropsych testing at [facility] in 2017       Family members feel that patient has progressed             Patient is not caring for his hygiene       Patient's house is not Clean watches TV all day does not leave the home       Family feels he should be in assisted living    Patient had difficulties with household tasks and caring for himself back in 2017 evaluated by Dr. 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  Per family previous testing showed \"stroke at birth\"       Concerned that he would have dementia as he aged    C.  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  Low average intellectual function  Scale levels third-fifth " grade level    MRI brain 8/29/2023 compared to 10/24/2017  1.  No acute/subacute infarcts, mass lesions, hydrocephalus or MRI evidence of intracranial hemorrhage.  2.  Mild diffuse cerebral parenchymal volume loss.         Presumed chronic hypertensive/microvascular ischemic white matter changes.  3.  Redemonstration of symmetric areas of encephalomalacia involving        the bilateral frontoparietal and occipital regions in a watershed type distribution.      Laboratory data                       7/2023  NA/K            140/3.9    BUN/Cr        13.9/0.88    GLU             147    AST              32    WBC/HGB    7.6/15.9    PLTs             191,000    HGBA1C      7.1    HDL/LDL      42/122    TSH              2.9    B12               575      MoCA  8/11/2023,      22 out of 30      Exam    Review of system    Difficulty with memory      No headache no chest pain no shortness of breath no nausea vomiting no diarrhea no fever chills    No diplopia dysarthria dysphagia  No focal visual changes  No focal weakness  No focal numbness    Complains of slow gait  Some balance problems  Slightly slurred speech    No bladder difficulties but does have impotence      Otherwise review systems negative    General exam   Alert and attentive  HEENT adequate hearing  Lungs clear  Heart rate regular  Abdomen soft  Symmetrical pulses    Neurologic exam  Normal prosody of speech  Normal naming  Normal comprehension  Normal repetition  No aphasia  No neglect    MoCA  8/11/2023         22 out of 30    Cranial nerves II through XII  No ophthalmoplegia  No nystagmus  No visual field cut  Speech clear  Face symmetrical  Tongue twisters okay    Upper extremities  No drift  No tremor    Lower extremities  Distal proximal strength okay    Gait  Gets up from the chair okay climbs onto the exam table okay  Walks up and down the baldwin turns reasonably no significant gait apraxia          Assessment/plan    Cognitive difficulties coming on worse  over the last 2 years        Previous difficulty with memory and cognition going on at least prior to 2017    2.   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  Previous neuropsychometric testing in 2017 showed functioning at 1/3/fifth grade level    4.  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  No acute/subacute infarcts, mass lesions, hydrocephalus or MRI evidence of intracranial hemorrhage.  2.  Mild diffuse cerebral parenchymal volume loss.         Presumed chronic hypertensive/microvascular ischemic white matter changes.  3.  Redemonstration of symmetric areas of encephalomalacia involving        the bilateral frontoparietal and occipital regions in a watershed type distribution.  EEG pending  Follow-up visit pending 9/1/2023  Formal neuropsychometric testing pending

## 2023-08-11 ENCOUNTER — OFFICE VISIT (OUTPATIENT)
Dept: NEUROLOGY | Facility: CLINIC | Age: 73
End: 2023-08-11
Attending: INTERNAL MEDICINE
Payer: MEDICARE

## 2023-08-11 VITALS
SYSTOLIC BLOOD PRESSURE: 136 MMHG | HEIGHT: 66 IN | HEART RATE: 113 BPM | BODY MASS INDEX: 32.78 KG/M2 | DIASTOLIC BLOOD PRESSURE: 79 MMHG | WEIGHT: 204 LBS

## 2023-08-11 DIAGNOSIS — I63.9 CEREBRAL ISCHEMIC STROKE DUE TO GLOBAL HYPOPERFUSION WITH WATERSHED INFARCT (H): ICD-10-CM

## 2023-08-11 DIAGNOSIS — R41.89 COGNITIVE IMPAIRMENT: ICD-10-CM

## 2023-08-11 DIAGNOSIS — G31.84 MCI (MILD COGNITIVE IMPAIRMENT): Primary | ICD-10-CM

## 2023-08-11 PROCEDURE — 99205 OFFICE O/P NEW HI 60 MIN: CPT | Performed by: PSYCHIATRY & NEUROLOGY

## 2023-08-11 ASSESSMENT — MONTREAL COGNITIVE ASSESSMENT (MOCA)
8. SERIAL SUBTRACTION OF 7S: 2
VISUOSPATIAL/EXECUTIVE SUBSCORE: 4
6. READ LIST OF DIGITS [FORWARD/BACKWARD]: 1
9. REPEAT EACH SENTENCE: 0
13. ORIENTATION SUBSCORE: 6
11. FOR EACH PAIR OF WORDS, WHAT CATEGORY DO THEY BELONG TO (OUT OF 2): 1
10. [FLUENCY] NAME WORDS STARTING WITH DESIGNATED LETTER: 0
WHAT LEVEL OF EDUCATION WAS ATTAINED: 0
WHAT IS THE TOTAL SCORE (OUT OF 30): 22
12. MEMORY INDEX SCORE: 5
4. NAME EACH OF THE THREE ANIMALS SHOWN: 2
7. [VIGILENCE] TAP WHEN HEARING DESIGNATED LETTER: 1

## 2023-08-11 NOTE — NURSING NOTE
Chief Complaint   Patient presents with    Cognitive decline     Had neuropsych testing in 2017.  Family member denies any memory concerns but state Dr. Pacheco referred him here for neuropsych testing.       Candida Yanes LPN on 8/11/2023 at 12:19 PM

## 2023-08-11 NOTE — NURSING NOTE
FRANKLIN COGNITIVE ASSESSMENT (MOCA)  Version 7.1 Original Version  VISUOSPATIAL/EXECUTIVE               COPY CUBE      [  1  ]                                [   0 ] DRAW CLOCK (Ten past eleven)  (3 points)    [   1 ]                    [ 1   ]               [  1  ]       Contour            Numbers     Hands POINTS                   41/ 5   NAMING    [  1 ]                                                                        [  0  ]                                             [  1  ]  Lialexey Corrigan                                Camel                     2/ 3   MEMORY Read list of words, subject must repeat them. Do 2 trials, even if 1st trial is successful. Do a recall after 5 minutes  FACE VELVET Mandaeism NIKKI RED No Points    1st          2nd         ATTENTION Read list of digits (1 digit/sec) Subject has to repeat in the forward order       [  0  ]   2  1  8  5  4                                [ 1   ] 7 4 2                          1/2   Read list of letters. The subject must tap with his hand at each letter A. No points if > 2 errors.  [   1 ] F B A C M N A A J K L B A F A K D E A A A J A M O F A A B              1/1   Serial 7 subtraction starting at 100          [  1  ] 93         [   1 ] 86          [  0  ] 79          [  0  ] 72         [    0] 65   4 or 5 correct subtractions: 3 points,  2 or 3 correct: 2 points,  1correct: 1 point,   0 correct: 0 points            1/3   LANGUAGE Repeat: I only know that Luke is the one to help today. [   0  ]                                      The cat always hid under the couch when dogs were in the room. [ 0  ]              0 /2   Fluency: Name maximum number of words in one minute that begin with the letter F                                                                                                                    [  0  ] __6_ (N > 11 words)               0/1   ABSTRACTION Similarity  between e.g. banana-orange=fruit                                                                   [  0  ] train-bicycle                      [  1  ] watch-ruler              1/2   DELAYED  RECALL Has to recall words  WITH NO CUE FACE  [   1 ] VELVET  [  1  ] Sikhism  [  1  ]  NIKKI  [  1  ] RED  [  1  ] Points for UNCUED recall only            5/5           OPTIONAL Category cue           Multiple choice cue          ORIENTATION  [  1  ] Date     [   1 ] Month       [   1 ] Year      [  1  ] Day      [  1  ] Place        [  1  ] City         6 /6   TOTAL  Normal > 26/30 Add 1 point if < 12 years education      22 /30

## 2023-08-11 NOTE — LETTER
"    8/11/2023         RE: Chemo Casillas  [address] [place] [address]        Dear Colleague,    Thank you for referring your patient, Chemo Casillas, to the [facility] [place]. Please see a copy of my visit note below.    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  Memory loss       History of neuropsych testing at [facility] in 2017       Family members feel that patient has progressed             Patient is not caring for his hygiene       Patient's house is not Clean watches TV all day does not leave the home       Family feels he should be in assisted living    Patient had difficulties with household tasks and caring for himself back in 2017 evaluated by Dr. 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  Per family previous testing showed \"stroke at birth\"       Concerned that he would have dementia as he aged    C.  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  Low average intellectual function  Scale levels third-fifth grade level    Laboratory data                       7/2023  NA/K            140/3.9    BUN/Cr        13.9/0.88    GLU             147    AST              32    WBC/HGB    7.6/15.9    PLTs             191,000    HGBA1C      7.1    HDL/LDL      42/122    TSH              2.9    B12               575      MoCA  8/11/2023,      22 out of 30      Exam    Review of system    Difficulty with memory      No headache no chest pain no shortness of breath no nausea vomiting no diarrhea no fever chills    No diplopia dysarthria dysphagia  No focal visual changes  No focal weakness  No focal numbness    Complains of slow gait  Some balance problems  Slightly slurred speech    No bladder difficulties but does have impotence      Otherwise review systems negative    General exam   Alert and attentive  HEENT adequate hearing  Lungs clear  Heart rate regular  Abdomen soft  Symmetrical pulses    Neurologic exam  Normal prosody of speech  Normal naming  Normal comprehension  Normal repetition  No aphasia  No neglect    MoCA  8/11/2023         22 out of 30    Cranial nerves II through XII  No ophthalmoplegia  No nystagmus  No visual field cut  Speech clear  Face symmetrical  Tongue twisters okay    Upper extremities  No drift  No tremor    Lower extremities  Distal proximal strength okay    Gait  Gets up from the chair okay climbs onto the exam table okay  Walks up and down the baldwin turns reasonably no significant gait apraxia          Assessment/plan    Cognitive difficulties coming on worse over the last 2 years        Previous difficulty with memory and cognition going on at least prior to 2017    2.   Possible watershed strokes from birth injury with the umbilical cord  around his neck at birth        MRI 2017 per verbal report showed watershed strokes 2017        Cognitive testing in 2017 showed function in the 3rd-5th grade level    3.  Previous neuropsychometric testing in 2017 showed functioning at 1/3/fifth grade level    4.  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        Sincerely,        david Mahmood MD

## 2023-08-18 ENCOUNTER — OFFICE VISIT (OUTPATIENT)
Dept: INTERNAL MEDICINE | Facility: CLINIC | Age: 73
End: 2023-08-18
Payer: MEDICARE

## 2023-08-18 VITALS
BODY MASS INDEX: 31.74 KG/M2 | HEART RATE: 99 BPM | HEIGHT: 66 IN | RESPIRATION RATE: 13 BRPM | OXYGEN SATURATION: 97 % | WEIGHT: 197.5 LBS | TEMPERATURE: 99.2 F | DIASTOLIC BLOOD PRESSURE: 60 MMHG | SYSTOLIC BLOOD PRESSURE: 110 MMHG

## 2023-08-18 DIAGNOSIS — I10 ESSENTIAL HYPERTENSION: ICD-10-CM

## 2023-08-18 DIAGNOSIS — E11.3293 TYPE 2 DIABETES MELLITUS WITH BOTH EYES AFFECTED BY MILD NONPROLIFERATIVE RETINOPATHY WITHOUT MACULAR EDEMA, WITHOUT LONG-TERM CURRENT USE OF INSULIN (H): Primary | ICD-10-CM

## 2023-08-18 DIAGNOSIS — E11.42 DIABETIC POLYNEUROPATHY ASSOCIATED WITH TYPE 2 DIABETES MELLITUS (H): ICD-10-CM

## 2023-08-18 DIAGNOSIS — N18.31 STAGE 3A CHRONIC KIDNEY DISEASE (H): ICD-10-CM

## 2023-08-18 DIAGNOSIS — E78.2 MIXED HYPERLIPIDEMIA: ICD-10-CM

## 2023-08-18 PROBLEM — E66.01 MORBID OBESITY (H): Status: RESOLVED | Noted: 2022-03-25 | Resolved: 2023-08-18

## 2023-08-18 LAB
ANION GAP SERPL CALCULATED.3IONS-SCNC: 16 MMOL/L (ref 7–15)
BUN SERPL-MCNC: 41.2 MG/DL (ref 8–23)
CALCIUM SERPL-MCNC: 9.8 MG/DL (ref 8.8–10.2)
CHLORIDE SERPL-SCNC: 101 MMOL/L (ref 98–107)
CREAT SERPL-MCNC: 1.24 MG/DL (ref 0.67–1.17)
DEPRECATED HCO3 PLAS-SCNC: 23 MMOL/L (ref 22–29)
GFR SERPL CREATININE-BSD FRML MDRD: 61 ML/MIN/1.73M2
GLUCOSE SERPL-MCNC: 126 MG/DL (ref 70–99)
POTASSIUM SERPL-SCNC: 4 MMOL/L (ref 3.4–5.3)
SODIUM SERPL-SCNC: 140 MMOL/L (ref 136–145)

## 2023-08-18 PROCEDURE — 99214 OFFICE O/P EST MOD 30 MIN: CPT | Performed by: INTERNAL MEDICINE

## 2023-08-18 PROCEDURE — 80048 BASIC METABOLIC PNL TOTAL CA: CPT | Performed by: INTERNAL MEDICINE

## 2023-08-18 PROCEDURE — 36415 COLL VENOUS BLD VENIPUNCTURE: CPT | Performed by: INTERNAL MEDICINE

## 2023-08-18 RX ORDER — LISINOPRIL 20 MG/1
20 TABLET ORAL DAILY
Qty: 90 TABLET | Refills: 4 | Status: SHIPPED | OUTPATIENT
Start: 2023-08-18 | End: 2024-09-30

## 2023-08-18 ASSESSMENT — PAIN SCALES - GENERAL: PAINLEVEL: NO PAIN (0)

## 2023-08-18 NOTE — PROGRESS NOTES
"  Office Visit - Follow Up   Chemo Casillas   73 year old male    Date of Visit: 8/18/2023    Chief Complaint   Patient presents with    Follow Up     BP medication and recheck.         Assessment and Plan   1. Type 2 diabetes mellitus with both eyes affected by mild nonproliferative retinopathy without macular edema, without long-term current use of insulin (H)  Continue same    2. Diabetic polyneuropathy associated with type 2 diabetes mellitus (H)  Stable    3. Stage 3a chronic kidney disease (H)  Stable    4. Mixed hyperlipidemia  Continue statin    5. Essential hypertension  Blood pressure is a little bit low, stop hydrochlorothiazide continue lisinopril and amlodipine  - Basic metabolic panel  (Ca, Cl, CO2, Creat, Gluc, K, Na, BUN); Future  - lisinopril (ZESTRIL) 20 MG tablet; Take 1 tablet (20 mg) by mouth daily  Dispense: 90 tablet; Refill: 4  - Basic metabolic panel  (Ca, Cl, CO2, Creat, Gluc, K, Na, BUN)    The following high BMI interventions were performed this visit: encouragement to exercise and lifestyle education regarding diet    Return in about 6 months (around 2/18/2024) for Follow up.     History of Present Illness   This 73 year old man comes in for follow-up.  He now has his medications and is taking them.  Feeling well without complaints.  Reviewed neurology notes.  We will have neuropsych testing done an MRI of the brain       Physical Exam   General Appearance:   No acute distress    /60 (BP Location: Left arm, Patient Position: Sitting, Cuff Size: Adult Large)   Pulse 99   Temp 99.2  F (37.3  C) (Tympanic)   Resp 13   Ht 1.676 m (5' 6\")   Wt 89.6 kg (197 lb 8 oz)   SpO2 97%   BMI 31.88 kg/m      Heart rate controlled rhythm regular lungs clear to auscultation bilaterally     Additional Information   Current Outpatient Medications   Medication Sig Dispense Refill    amLODIPine (NORVASC) 10 MG tablet Take 1 tablet (10 mg) by mouth daily 90 tablet 4    aspirin 81 MG EC tablet " Take 1 tablet (81 mg) by mouth daily 90 tablet 4    atorvastatin (LIPITOR) 20 MG tablet Take 1 tablet (20 mg) by mouth daily 90 tablet 4    lisinopril (ZESTRIL) 20 MG tablet Take 1 tablet (20 mg) by mouth daily 90 tablet 4    metFORMIN (GLUCOPHAGE) 1000 MG tablet Take 1 tablet (1,000 mg) by mouth 2 times daily (with meals) 180 tablet 4       Time:      Cody Pacheco MD

## 2023-08-24 ENCOUNTER — PATIENT OUTREACH (OUTPATIENT)
Dept: CARE COORDINATION | Facility: CLINIC | Age: 73
End: 2023-08-24
Payer: MEDICARE

## 2023-08-24 NOTE — PROGRESS NOTES
Clinic Care Coordination Contact  Community Health Worker Follow Up    Care Gaps:     Health Maintenance Due   Topic Date Due    ZOSTER IMMUNIZATION (1 of 2) Never done    DTAP/TDAP/TD IMMUNIZATION (2 - Td or Tdap) 01/16/2018       Care Gaps Last addressed on 8/24/2023    Care Plan:   Care Plan: Help At Home       Problem: Insufficient In-home support       Long-Range Goal: i will live safely in my home with support as needed.       Start Date: 7/27/2023 Expected End Date: 7/26/2024    This Visit's Progress: 10%    Priority: High    Note:     Barriers: may not recognize what is needed.   Strengths: family support.   Patient expressed understanding of goal: sister in law does  Action steps to achieve this goal:  1. Sister in law will talk to patient about supports to help at home.   2. Sister in law will arrange for supports if he is willing.  3. Sister hollis cook will report progress towards this goal at scheduled outreach telephone calls from the CCC team.    Discussed 8/24 with suzy                            Care Plan: Medication Regimen       Problem: Medication Adherence       Long-Range Goal: Consistently take Medications as Prescribed       Start Date: 7/27/2023 Expected End Date: 7/26/2024    This Visit's Progress: 10%    Priority: High    Note:     Barriers: has not been taking meds for over a year.   Strengths: family can set up meds in pill boxes and monitor.  Patient expressed understanding of goal: sister in law does.  Action steps to achieve this goal:  1. Sister in law will work with pharmacy to get medications sent to her home.   2. Sister in law will set up pill boxes with patient.  3. Sister hollis cook will monitor and work with [facility] pharmacist as needed.  4. Sister hollis cook will report progress towards this goal at scheduled outreach telephone calls from the [facility] team.    8/24 discussed, HUMBERTO Suzy is setting up med box weekly, will be looking into new med box for 2 week duration.                            Care  Plan: Specialty Referral       Problem: Patient is in need of specialty care       Goal: I will have neuro-psych appt to assess my cognition.       Start Date: 7/27/2023 Expected End Date: 1/1/2024    This Visit's Progress: 40%    Priority: Medium    Note:     Barriers:none noted.   Strengths: have had one done before and can hopefully go back to same clinic.  Family support.   Patient expressed understanding of goal: sister in law does.  Action steps to achieve this goal:  1.  in  will schedule appt.and will bring him to appt.   2.  in law will report progress towards this goal at scheduled outreach telephone calls from the [facility] team.    8/24 discussed with HUMBERTO Almonte, was able to get in quick due to cancellation, first appt was on 8/17, needs MRI and EKG follow up appt is scheduled for 9/1/2023                              Care Plan: Community Resources       Problem: Lack of transportation               Problem: Insufficient In-home support       Long-Range Goal: I will have [place] Choices assessment to see if I can get in home supports.       Start Date: 7/27/2023 Expected End Date: 7/26/2024    This Visit's Progress: 10%    Priority: Medium    Note:     Barriers: none noted  Strengths: family can help.   Patient expressed understanding of goal:  in  set up.  Action steps to achieve this goal:  1.  in  will call [place] Cho[place] assessment and be with patient at assessment.  2.  in  will help complete all paper work.  3.  in  will report progress towards this goal at scheduled outreach telephone calls from the CCC team.    Discussed 8/24 with Suzy, scheduled follow up SW call to discuss alternative elderly waiver                               Intervention and Education during outreach: Supportive listening, CHW scheduled follow up CC SW call on 8/31 @ 1pm to discuss [facility]'s questions regarding alternative elderly waiver.    CHW spoke to [facility] Suzy today for monthly CC  outreach call, Suzy states things are going well and was thankful for outreach call today.  Perfect time to call is 1pm as she watches her grandson and that is normal naptime. Chemo recent neurologist appt on 8/17- due to cancellation, patient has MRI and EKG as well as follow up appointment with neurologist for 9/1/2023.  Suzy continues to set up patient's medications in his pill box, she is going to look into 2 week box.  Suzy has questions regarding alternative elderly waiver, Jacob mary called however at time of call patient was taking care of grandson and did not hear at time. She provided her email to lady at [facility] about 5 times however has not received any information that she requested as of this time.  CHW scheduled follow up CC SW call on 8/31/23 @ 1pm to address Suzy's questions regarding the waiver requirements.    CHW Plan: Continue with monthly outreach call to discuss, support and update CC goal progression    Next CHW outreach call: 9/22/2023    Glenis ROSENBAUM  Community Health Worker  [facility]  Clinic Care Coordination  Kat Estrella [email or web address]  [email or web address]  Office: [phone]

## 2023-08-29 ENCOUNTER — HOSPITAL ENCOUNTER (OUTPATIENT)
Dept: MRI IMAGING | Facility: CLINIC | Age: 73
Discharge: HOME | End: 2023-08-29
Attending: PSYCHIATRY & NEUROLOGY | Admitting: PSYCHIATRY & NEUROLOGY
Payer: MEDICARE

## 2023-08-29 DIAGNOSIS — I63.9 CEREBRAL ISCHEMIC STROKE DUE TO GLOBAL HYPOPERFUSION WITH WATERSHED INFARCT (H): ICD-10-CM

## 2023-08-29 DIAGNOSIS — G31.84 MCI (MILD COGNITIVE IMPAIRMENT): ICD-10-CM

## 2023-08-29 PROCEDURE — G1010 CDSM STANSON: HCPCS

## 2023-08-30 NOTE — PROGRESS NOTES
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    Discussed with patient and family member at length about possibility of lowered seizure threshold  Talked about the option of adding Keppra due to the paroxysmal nature of the EEG to see if this would help stabilize things  Discussed at length that he may not have true epilepsy that this would be a trial of medication low-dose Keppra 250 mg twice daily.    Family members help with all of his medicines and the fontanez so that he can remember them        A.  Memory loss       History of neuropsych testing at [facility] in 2017       Family members feel that patient has progressed             Patient is not caring for his hygiene       Patient's house is not Clean watches TV all day does not leave the home       Family feels he should be in assisted living    Patient had difficulties with household tasks and caring for himself back in 2017 evaluated by Dr. 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  Per family previous testing showed \"stroke at birth\"       Concerned that he would have dementia as he aged    C.  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  Low average intellectual function  Scale levels third-fifth grade level    MRI brain 8/29/2023 compared to 10/24/2017  1.  No acute/subacute infarcts, mass lesions, hydrocephalus or MRI evidence of intracranial hemorrhage.  2.  Mild diffuse cerebral parenchymal volume loss.         Presumed chronic hypertensive/microvascular ischemic white matter changes.  3.  Redemonstration of symmetric areas of encephalomalacia involving        the bilateral frontoparietal and occipital regions in a watershed type distribution.  EEG 9/1/2023  Impression moderately abnormal awake EEG due to:  Rare paroxysmal burst of generalized delta slowing lasting 1-2 seconds without intermixed spikes  Clinical correlation  This could be indicative of lowered seizure threshold        Laboratory data                       7/2023  NA/K            140/3.9    BUN/Cr        13.9/0.88    GLU             147    AST              32    WBC/HGB    7.6/15.9    PLTs             191,000    HGBA1C      7.1    HDL/LDL      42/122    TSH              2.9    B12               575      MoCA  8/11/2023,      22 out of 30      Exam    Review of system    Difficulty with " memory      No headache no chest pain no shortness of breath no nausea vomiting no diarrhea no fever chills    No diplopia dysarthria dysphagia  No focal visual changes  No focal weakness  No focal numbness    Complains of slow gait  Some balance problems  Slightly slurred speech    No bladder difficulties but does have impotence      Otherwise review systems negative    General exam  Blood pressure 158/84  Alert and attentive  HEENT adequate hearing  Lungs clear  Heart rate regular  Abdomen soft  Symmetrical pulses    Neurologic exam  Normal prosody of speech  Normal naming  Normal comprehension  Normal repetition  No aphasia  No neglect    MoCA  8/11/2023         22 out of 30    Cranial nerves II through XII  No ophthalmoplegia  No nystagmus  No visual field cut  Speech clear  Face symmetrical  Tongue twisters okay    Upper extremities  No drift  No tremor    Lower extremities  Distal proximal strength okay    Gait  Gets up from the chair okay climbs onto the exam table okay  Walks up and down the baldwin turns reasonably no significant gait apraxia          Assessment/plan    Cognitive difficulties coming on worse over the last 2 years        Previous difficulty with memory and cognition going on at least prior to 2017    2.   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  Previous neuropsychometric testing in 2017 showed functioning at 1/3/fifth grade level    4.  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    Family members help with all of his medicines and the fontanez so that he can remember them  Repeat neuropsych's compared to previous scheduled February 21, 2024    Keppra 250 mg p.o. twice daily empiric trial to see if there is any change in sensorium over the long run  We will get his repeat neuropsych testing in February 2024  Follow-up March 2024    Discussed with patient and patient's sister-in-law  Reviewed the actual MRI scan pictures with patient and sister-in-law    32 minutes total care time today    Addendum 3/8/2024  Formal neuropsychometric test 2/21/2024 reviewed  Moderate impairment and nonverbal learning and memory  Otherwise basic attention/speed of thinking/spatial skills/verbal learning/verbal memory stable.  As compared to testing 7 years ago there are declines and measures of nonverbal learning otherwise scores are remarkably stable to improved.  No evidence neurodegenerative process  Cognitive lapses in daily life best explained by normal EEG on a brain that has already been compromised.  Will discuss further with patient 4/17/2024.  Follow-up.

## 2023-08-31 ENCOUNTER — PATIENT OUTREACH (OUTPATIENT)
Dept: NURSING | Facility: CLINIC | Age: 73
End: 2023-08-31
Payer: MEDICARE

## 2023-08-31 ASSESSMENT — ACTIVITIES OF DAILY LIVING (ADL): DEPENDENT_IADLS:: TRANSPORTATION;MEDICATION MANAGEMENT

## 2023-08-31 NOTE — PROGRESS NOTES
Clinic Care Coordination Contact  Follow Up Progress Note      Assessment: Called sister in law Almonte who was with her daughter Mel.  Suzy has been communicating with [facility] Aydin about alternative care and elderly waiver.  They had questions about the purpose and whether they should proceed.  They think he makes about $3,000 per month in income.  Unsure of assets. Suzy is now helping with his bill paying and they have gathered up some information.  Discussed spend down and how that works.  Suzy will contact Aydin at [facility] to see if he would qualify for AC. Also discussed the lien that would be put on his house if he uses services through AC.  Discussed private pay options as well as what Medicare pays for.  They would like him to get out more. Reviewed Neighbor to Neighbor program through [employer] and they will review and call if interested.  They may want to also hire housecleaning. He has said no to meals in the past, but they can ask him again.  They are trying to limit how much Suzy has to do for him.  Discussed private pay case management as well.  They appreciated the information.  They will work on this and start slowly with making changes.      Care Gaps:    Health Maintenance Due   Topic Date Due    ZOSTER IMMUNIZATION (1 of 2) Never done    DTAP/TDAP/TD IMMUNIZATION (2 - Td or Tdap) 01/16/2018       Postponed to next pcp appt     Care Plans  Care Plan: Help At Home       Problem: Insufficient In-home support       Long-Range Goal: i will live safely in my home with support as needed.       Start Date: 7/27/2023 Expected End Date: 7/26/2024    This Visit's Progress: 10%    Priority: High    Note:     Barriers: may not recognize what is needed.   Strengths: family support.   Patient expressed understanding of goal: sister in law does  Action steps to achieve this goal:  1. Sister in law will talk to patient about supports to help at home.   2. Sister in law will arrange for  supports if he is willing.  3. Sister hollis cook will report progress towards this goal at scheduled outreach telephone calls from the [facility] team.    Discussed 8/24 with cole                            Care Plan: Medication Regimen       Problem: Medication Adherence       Long-Range Goal: Consistently take Medications as Prescribed       Start Date: 7/27/2023 Expected End Date: 7/26/2024    This Visit's Progress: 10%    Priority: High    Note:     Barriers: has not been taking meds for over a year.   Strengths: family can set up meds in pill boxes and monitor.  Patient expressed understanding of goal: sister in law does.  Action steps to achieve this goal:  1. Sister in law will work with pharmacy to get medications sent to her home.   2. Sister in law will set up pill boxes with patient.  3. Sister hollis cook will monitor and work with [facility] pharmacist as needed.  4. Sister hollis cook will report progress towards this goal at scheduled outreach telephone calls from the [facility] team.    8/24 discussed, HUMBERTO Almonte is setting up med box weekly, will be looking into new med box for 2 week duration.                            Care Plan: Specialty Referral       Problem: Patient is in need of specialty care       Goal: I will have neuro-psych appt to assess my cognition.       Start Date: 7/27/2023 Expected End Date: 1/1/2024    This Visit's Progress: 40%    Priority: Medium    Note:     Barriers:none noted.   Strengths: have had one done before and can hopefully go back to same clinic.  Family support.   Patient expressed understanding of goal: sister in law does.  Action steps to achieve this goal:  1. Sister in law will schedule appt.and will bring him to appt.   2. Sister hollis cook will report progress towards this goal at scheduled outreach telephone calls from the [facility] team.    8/24 discussed with HUMBERTO Almonte, was able to get in quick due to cancellation, first appt was on 8/17, needs MRI and EKG follow up appt is scheduled for 9/1/2023                               Care Plan: Community Resources       Problem: Lack of transportation               Problem: Insufficient In-home support       Long-Range Goal: I [place] to see if I can get in home supports.       Start Date: 7/27/2023 Expected End Date: 7/26/2024    This Visit's Progress: 10%    Priority: Medium    Note:     Barriers: none noted  Strengths: family can help.   Patient expressed understanding of goal: sister in law set up.  Action steps to achieve this goal:  1. Sister in law will [place] and be with patient at assessment.  2. Sister in law will help complete all paper work.  3. Sister in law will report progress towards this goal at scheduled outreach telephone calls from the [facility] team.    Discussed 8/24 with Suzy, scheduled follow up SW call to discuss alternative elderly waiver                               Intervention/Education provided during outreach: support and reviewed in home supports.      Outreach Frequency: monthly      Plan:   [facility] SW will continue to monitor, support patient with current goals and will be available to assist as needs arise. [facility] CHW will reach out to patient on a monthly basis to discuss progression of goals.      [facility] SW will perform Chart Review in 45 days.

## 2023-09-01 ENCOUNTER — OFFICE VISIT (OUTPATIENT)
Dept: NEUROLOGY | Facility: CLINIC | Age: 73
End: 2023-09-01
Payer: MEDICARE

## 2023-09-01 ENCOUNTER — ANCILLARY PROCEDURE (OUTPATIENT)
Dept: NEUROLOGY | Facility: CLINIC | Age: 73
End: 2023-09-01
Attending: PSYCHIATRY & NEUROLOGY
Payer: MEDICARE

## 2023-09-01 VITALS
HEIGHT: 66 IN | SYSTOLIC BLOOD PRESSURE: 158 MMHG | WEIGHT: 197 LBS | BODY MASS INDEX: 31.66 KG/M2 | DIASTOLIC BLOOD PRESSURE: 84 MMHG

## 2023-09-01 DIAGNOSIS — R41.89 COGNITIVE IMPAIRMENT: ICD-10-CM

## 2023-09-01 DIAGNOSIS — G31.84 MCI (MILD COGNITIVE IMPAIRMENT): Primary | ICD-10-CM

## 2023-09-01 DIAGNOSIS — G31.84 MCI (MILD COGNITIVE IMPAIRMENT): ICD-10-CM

## 2023-09-01 DIAGNOSIS — I63.9 CEREBRAL ISCHEMIC STROKE DUE TO GLOBAL HYPOPERFUSION WITH WATERSHED INFARCT (H): ICD-10-CM

## 2023-09-01 PROCEDURE — 99214 OFFICE O/P EST MOD 30 MIN: CPT | Performed by: PSYCHIATRY & NEUROLOGY

## 2023-09-01 PROCEDURE — 95816 EEG AWAKE AND DROWSY: CPT | Performed by: PSYCHIATRY & NEUROLOGY

## 2023-09-01 RX ORDER — LEVETIRACETAM 250 MG/1
250 TABLET ORAL 2 TIMES DAILY
Qty: 180 TABLET | Refills: 3 | Status: SHIPPED | OUTPATIENT
Start: 2023-09-01 | End: 2023-09-06

## 2023-09-01 NOTE — NURSING NOTE
Chief Complaint   Patient presents with    Results     Discuss MRI and EEG results.     Candida Yanes LPN on 9/1/2023 at 1:42 PM

## 2023-09-01 NOTE — LETTER
"    9/1/2023         RE: Chemo Casillas  [address]  [place] [address]        Dear Colleague,    Thank you for referring your patient, Chemo Casillas, to the [facility] [place]. Please see a copy of my visit note below.    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    Discussed with patient and family member at length about possibility of lowered seizure threshold  Talked about the option of adding Keppra due to the paroxysmal nature of the EEG to see if this would help stabilize things  Discussed at length that he may not have true epilepsy that this would be a trial of medication low-dose Keppra 250 mg twice daily.    Family members help with all of his medicines and the fontanez so that he can remember them        A.  Memory loss       History of neuropsych testing at [facility] in 2017       Family members feel that patient has progressed             Patient is not caring for his hygiene       Patient's house is not Clean watches TV all day does not leave the home       Family feels he should be in assisted living    Patient had difficulties with household tasks and caring for himself back in 2017 evaluated by Dr. 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  Per family previous testing showed \"stroke at birth\"       Concerned that he would have dementia as he aged    C.  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  Low average intellectual function  Scale levels third-fifth grade level    MRI brain 8/29/2023 compared to 10/24/2017  1.  No acute/subacute infarcts, mass lesions, hydrocephalus or MRI evidence of intracranial hemorrhage.  2.  Mild diffuse cerebral parenchymal volume loss.         Presumed chronic hypertensive/microvascular ischemic white matter changes.  3.  Redemonstration of symmetric areas of encephalomalacia involving        the bilateral frontoparietal and occipital regions in a watershed type distribution.  EEG 9/1/2023  Impression moderately abnormal awake EEG due to:  Rare paroxysmal burst of generalized delta slowing lasting 1-2 seconds without intermixed spikes  Clinical correlation  This could be indicative of lowered seizure threshold        Laboratory data                       7/2023  NA/K            140/3.9    BUN/Cr        13.9/0.88    GLU             " 147    AST              32    WBC/HGB    7.6/15.9    PLTs             191,000    HGBA1C      7.1    HDL/LDL      42/122    TSH              2.9    B12               575      MoCA  8/11/2023,      22 out of 30      Exam    Review of system    Difficulty with memory      No headache no chest pain no shortness of breath no nausea vomiting no diarrhea no fever chills    No diplopia dysarthria dysphagia  No focal visual changes  No focal weakness  No focal numbness    Complains of slow gait  Some balance problems  Slightly slurred speech    No bladder difficulties but does have impotence      Otherwise review systems negative    General exam  Blood pressure 158/84  Alert and attentive  HEENT adequate hearing  Lungs clear  Heart rate regular  Abdomen soft  Symmetrical pulses    Neurologic exam  Normal prosody of speech  Normal naming  Normal comprehension  Normal repetition  No aphasia  No neglect    MoCA  8/11/2023         22 out of 30    Cranial nerves II through XII  No ophthalmoplegia  No nystagmus  No visual field cut  Speech clear  Face symmetrical  Tongue twisters okay    Upper extremities  No drift  No tremor    Lower extremities  Distal proximal strength okay    Gait  Gets up from the chair okay climbs onto the exam table okay  Walks up and down the baldwin turns reasonably no significant gait apraxia          Assessment/plan    Cognitive difficulties coming on worse over the last 2 years        Previous difficulty with memory and cognition going on at least prior to 2017    2.   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  Previous  neuropsychometric testing in 2017 showed functioning at 1/3/fifth grade level    4.  Vascular risk factors        Hypertension/hyperlipidemia/diabetes/cardiomyopathy/CKD    Diagnosis  Mild cognitive impairment  Pre-existing cognitive difficulties anoxic injury at birth  Vascular disease as above    Discussed with patient and family member at length about possibility of lowered seizure threshold  Talked about the option of adding Keppra due to the paroxysmal nature of the EEG to see if this would help stabilize things  Discussed at length that he may not have true epilepsy that this would be a trial of medication low-dose Keppra 250 mg twice daily.    Family members help with all of his medicines and the fontanez so that he can remember them  Repeat neuropsych's compared to previous scheduled February 21, 2024    Keppra 250 mg p.o. twice daily empiric trial to see if there is any change in sensorium over the long run  We will get his repeat neuropsych testing in February 2024  Follow-up March 2024    Discussed with patient and patient's sister-in-law  Reviewed the actual MRI scan pictures with patient and sister-in-law    32 minutes total care time today      Again, thank you for allowing me to participate in the care of your patient.        Sincerely,        david Mahmood MD

## 2023-09-06 ENCOUNTER — OFFICE VISIT (OUTPATIENT)
Dept: INTERNAL MEDICINE | Facility: CLINIC | Age: 73
End: 2023-09-06
Payer: MEDICARE

## 2023-09-06 VITALS
HEIGHT: 66 IN | SYSTOLIC BLOOD PRESSURE: 138 MMHG | BODY MASS INDEX: 33.23 KG/M2 | TEMPERATURE: 97.8 F | RESPIRATION RATE: 15 BRPM | WEIGHT: 206.8 LBS | HEART RATE: 84 BPM | OXYGEN SATURATION: 95 % | DIASTOLIC BLOOD PRESSURE: 74 MMHG

## 2023-09-06 DIAGNOSIS — Z12.5 SCREENING FOR PROSTATE CANCER: ICD-10-CM

## 2023-09-06 DIAGNOSIS — R35.0 BENIGN PROSTATIC HYPERPLASIA WITH URINARY FREQUENCY: Primary | ICD-10-CM

## 2023-09-06 DIAGNOSIS — R94.01 NONSPECIFIC ABNORMAL ELECTROENCEPHALOGRAM (EEG): ICD-10-CM

## 2023-09-06 DIAGNOSIS — N40.1 BENIGN PROSTATIC HYPERPLASIA WITH URINARY FREQUENCY: Primary | ICD-10-CM

## 2023-09-06 DIAGNOSIS — I10 ESSENTIAL HYPERTENSION: ICD-10-CM

## 2023-09-06 DIAGNOSIS — R41.89 COGNITIVE IMPAIRMENT: ICD-10-CM

## 2023-09-06 LAB
ANION GAP SERPL CALCULATED.3IONS-SCNC: 14 MMOL/L (ref 7–15)
BUN SERPL-MCNC: 18.6 MG/DL (ref 8–23)
CALCIUM SERPL-MCNC: 9.6 MG/DL (ref 8.8–10.2)
CHLORIDE SERPL-SCNC: 106 MMOL/L (ref 98–107)
CREAT SERPL-MCNC: 0.84 MG/DL (ref 0.67–1.17)
DEPRECATED HCO3 PLAS-SCNC: 24 MMOL/L (ref 22–29)
EGFRCR SERPLBLD CKD-EPI 2021: >90 ML/MIN/1.73M2
GLUCOSE SERPL-MCNC: 103 MG/DL (ref 70–99)
POTASSIUM SERPL-SCNC: 4.2 MMOL/L (ref 3.4–5.3)
PSA SERPL DL<=0.01 NG/ML-MCNC: 3.67 NG/ML (ref 0–6.5)
SODIUM SERPL-SCNC: 144 MMOL/L (ref 136–145)

## 2023-09-06 PROCEDURE — 80048 BASIC METABOLIC PNL TOTAL CA: CPT | Performed by: INTERNAL MEDICINE

## 2023-09-06 PROCEDURE — 90662 IIV NO PRSV INCREASED AG IM: CPT | Performed by: INTERNAL MEDICINE

## 2023-09-06 PROCEDURE — 36415 COLL VENOUS BLD VENIPUNCTURE: CPT | Performed by: INTERNAL MEDICINE

## 2023-09-06 PROCEDURE — G0008 ADMIN INFLUENZA VIRUS VAC: HCPCS | Performed by: INTERNAL MEDICINE

## 2023-09-06 PROCEDURE — G0103 PSA SCREENING: HCPCS | Performed by: INTERNAL MEDICINE

## 2023-09-06 PROCEDURE — 99214 OFFICE O/P EST MOD 30 MIN: CPT | Mod: 25 | Performed by: INTERNAL MEDICINE

## 2023-09-06 RX ORDER — TAMSULOSIN HYDROCHLORIDE 0.4 MG/1
0.4 CAPSULE ORAL DAILY
Qty: 90 CAPSULE | Refills: 4 | Status: SHIPPED | OUTPATIENT
Start: 2023-09-06

## 2023-09-06 RX ORDER — FINASTERIDE 5 MG/1
5 TABLET, FILM COATED ORAL DAILY
Qty: 90 TABLET | Refills: 4 | Status: SHIPPED | OUTPATIENT
Start: 2023-09-06

## 2023-09-06 ASSESSMENT — PAIN SCALES - GENERAL: PAINLEVEL: NO PAIN (0)

## 2023-09-06 NOTE — PROGRESS NOTES
Office Visit - Follow Up   Chemo Casillas   73 year old male    Date of Visit: 9/6/2023    Chief Complaint   Patient presents with    Follow Up     Neuro follow up    Recheck Medication        Assessment and Plan   1. Benign prostatic hyperplasia with urinary frequency  Not mention below, he has had some BPH symptoms we will start medications as below  - tamsulosin (FLOMAX) 0.4 MG capsule; Take 1 capsule (0.4 mg) by mouth daily  Dispense: 90 capsule; Refill: 4  - finasteride (PROSCAR) 5 MG tablet; Take 1 tablet (5 mg) by mouth daily  Dispense: 90 tablet; Refill: 4    2. Screening for prostate cancer  - PSA, screen; Future  - PSA, screen    3. Essential hypertension  - Basic metabolic panel  (Ca, Cl, CO2, Creat, Gluc, K, Na, BUN); Future  - Basic metabolic panel  (Ca, Cl, CO2, Creat, Gluc, K, Na, BUN)    4. Nonspecific abnormal electroencephalogram (EEG)  5. Cognitive impairment  We reviewed in detail neurology assessment and recommendations.  It seems very reasonable to try Keppra but the patient and his family are hesitant.  I also think it is reasonable to not start Keppra given his improvements with some organization and control of his blood pressure and other medical problems.  At this time they are going to hold off on starting Keppra but are not opposed to reconsidering this at some point in the future.    Return in about 6 months (around 3/6/2024) for Follow up.     History of Present Illness   This 73 year old man comes in for follow-up.  Recently saw neurology and there is some abnormality on his EEG which is infrequent and nonspecific but suggestive of a low seizure threshold.  He had watershed strokes at the time of his birth.  He has had some cognitive decline although now that his family is taking a more active role in helping him with managing his life things have improved somewhat.  He and his family are somewhat hesitant to start Keppra.       Physical Exam   General Appearance:   No acute  "distress    /74 (BP Location: Left arm, Patient Position: Sitting, Cuff Size: Adult Large)   Pulse 84   Temp 97.8  F (36.6  C)   Resp 15   Ht 1.676 m (5' 6\")   Wt 93.8 kg (206 lb 12.8 oz)   SpO2 95%   BMI 33.38 kg/m           Additional Information   Current Outpatient Medications   Medication Sig Dispense Refill    amLODIPine (NORVASC) 10 MG tablet Take 1 tablet (10 mg) by mouth daily 90 tablet 4    aspirin 81 MG EC tablet Take 1 tablet (81 mg) by mouth daily 90 tablet 4    atorvastatin (LIPITOR) 20 MG tablet Take 1 tablet (20 mg) by mouth daily 90 tablet 4    finasteride (PROSCAR) 5 MG tablet Take 1 tablet (5 mg) by mouth daily 90 tablet 4    lisinopril (ZESTRIL) 20 MG tablet Take 1 tablet (20 mg) by mouth daily 90 tablet 4    metFORMIN (GLUCOPHAGE) 1000 MG tablet Take 1 tablet (1,000 mg) by mouth 2 times daily (with meals) 180 tablet 4    tamsulosin (FLOMAX) 0.4 MG capsule Take 1 capsule (0.4 mg) by mouth daily 90 capsule 4       Time:      Cody Pacheco MD  "

## 2023-10-03 ENCOUNTER — PATIENT OUTREACH (OUTPATIENT)
Dept: CARE COORDINATION | Facility: CLINIC | Age: 73
End: 2023-10-03
Payer: MEDICARE

## 2023-10-03 NOTE — PROGRESS NOTES
Clinic Care Coordination Contact  [facility]/Voicemail       Clinical Data: Care Coordinator Outreach  Outreach attempted x 1.  Left message on  patient's HUMBERTO Suzy  voicemail with call back information and requested return call.  Plan: Care Coordinator CHW to update and discuss current CC goal progression    Care Coordinator will try to reach patient again in 2 weeks= 10/16/2023.    CC Goals:  In home support/[place] assmt  Medication Compliance  Neuro Psych testing= any update?        Glenis ROSENBAUM  Community Health Worker  [facility] Care Coordination  Kat [email or web address] [email or web address]  Office: [phone]

## 2023-10-12 ENCOUNTER — PATIENT OUTREACH (OUTPATIENT)
Dept: CARE COORDINATION | Facility: CLINIC | Age: 73
End: 2023-10-12
Payer: MEDICARE

## 2023-10-12 NOTE — LETTER
[facility] CARE COORDINATION  [facility]  October 12, 2023        Chemo Casillas  [address]  [place] [address]          Dear Benito Mclain is an updated Complex Care Plan for your continued enrollment in Care Coordination. Please let us know if you have additional questions, concerns, or goals that we can assist with.    Sincerely,    Sparkle Salazar,   [facility]  [phone]        [facility]  Patient Centered Plan of Care  About Me:        Patient Name:  Chemo Casillas    YOB: 1950  Age:         73 year old   [place] MRN:    [id number] Telephone Information:  Home Phone [phone]   Mobile [phone]       Address:  [address]  [place] [address] Email address:  [email or web address]      Emergency Contact(s)    Name Relationship Lgl Grd Work Phone Home Phone Mobile Phone   1. SHRAVAN CLINE Sister-in-Law   [phone]           Primary language:  English     needed? No   [place]:  [phone] op. 1  Other communication barriers:Access to technology    Preferred Method of Communication:     Current living arrangement: I live in a private home    Mobility Status/ Medical Equipment: Independent        Health Maintenance  Health Maintenance Reviewed: Due/Overdue   Health Maintenance Due   Topic Date Due    ZOSTER IMMUNIZATION (1 of 2) Never done    HEPATITIS B IMMUNIZATION (1 of 3 - Risk 3-dose series) Never done    RSV VACCINE 60+ (1 - 1-dose 60+ series) Never done    DTAP/TDAP/TD IMMUNIZATION (2 - Td or Tdap) 01/16/2018    COVID-19 Vaccine (7 - 2023-24 season) 09/15/2023           My Access Plan  Medical Emergency 911   Primary Clinic Line [facility] - [phone]   24 Hour Appointment Line [phone] or  [phone]) (toll-free)   24 Hour Nurse Line [phone] (toll-free)   Preferred Urgent Care [facility]  [phone]     Preferred Hospital [facility]  [phone]     Preferred Pharmacy [id number] [place] [id number] AT [facility] [address]     Behavioral Health Crisis Line The National Suicide Prevention Lifeline at 1-192.369.5576 or Text/Call 988             My Care Team Members  Patient Care Team         Relationship Specialty Notifications Start End    Cody Pacheco MD PCP - General   2/10/16     Phone: [phone] Fax: [phone]         [address] [facility] [address]    Cody Pacheco MD Assigned PCP   4/3/22     Phone: [phone] Fax: [phone] [address] [facility] [address]    Sparkle Salazar LSW Lead Care Coordinator Primary Care - CC Admissions 7/25/23     Phone: [phone]         Farrah Perea Community Health Worker  Admissions 7/27/23     Cody Mahmood MD MD Neurology  8/8/23     Phone: [phone] Fax: [phone]         [address] [facility] [address]    Cody Mahmood MD Assigned Neuroscience Provider   8/12/23     Phone: [phone] Fax: [phone]         [address] [facility] [address]              My Care Plans  Self Management and Treatment Plan  Care Plan  Care Plan: Help At Home       Problem: Insufficient In-home support       Long-Range Goal: i will live safely in my home with support as needed.       Start Date: 7/27/2023 Expected End Date: 7/26/2024    This Visit's Progress: 10%    Priority: High    Note:     Barriers: may not recognize what is needed.   Strengths: family support.   Patient expressed understanding of goal: sister in law does  Action steps to achieve this goal:  1. Sister in law will talk to patient about supports to help at home.   2. Sister in law will arrange for supports if he is willing.  3. Sister in law will report progress towards this goal at scheduled outreach telephone calls from the CCC team.    Discussed 8/24 with  cole                            Care Plan: Medication Regimen       Problem: Medication Adherence       Long-Range Goal: Consistently take Medications as Prescribed       Start Date: 7/27/2023 Expected End Date: 7/26/2024    This Visit's Progress: 10%    Priority: High    Note:     Barriers: has not been taking meds for over a year.   Strengths: family can set up meds in pill boxes and monitor.  Patient expressed understanding of goal: sister in law does.  Action steps to achieve this goal:  1. Sister in law will work with pharmacy to get medications sent to her home.   2. Sister in law will set up pill boxes with patient.  3. Sister hollis cook will monitor and work with [facility] pharmacist as needed.  4. Sister hollis cook will report progress towards this goal at scheduled outreach telephone calls from the [facility] team.    8/24 discussed, HUMBERTO Almonte is setting up med box weekly, will be looking into new med box for 2 week duration.                            Care Plan: Specialty Referral       Problem: Patient is in need of specialty care       Goal: I will have neuro-psych appt to assess my cognition.       Start Date: 7/27/2023 Expected End Date: 1/1/2024    This Visit's Progress: 40%    Priority: Medium    Note:     Barriers:none noted.   Strengths: have had one done before and can hopefully go back to same clinic.  Family support.   Patient expressed understanding of goal: sister in law does.  Action steps to achieve this goal:  1. Sister in law will schedule appt.and will bring him to appt.   2. Sister hollis cook will report progress towards this goal at scheduled outreach telephone calls from the [facility] team.    8/24 discussed with HUBMERTO Almonte, was able to get in quick due to cancellation, first appt was on 8/17, needs MRI and EKG follow up appt is scheduled for 9/1/2023                              Care Plan: Community Resources       Problem: Lack of transportation               Problem: Insufficient In-home support       Long-Range Goal:  [place] if I can get in home supports.       Start Date: 7/27/2023 Expected End Date: 7/26/2024    This Visit's Progress: 10%    Priority: Medium    Note:     Barriers: none noted  Strengths: family can help.   Patient expressed understanding of goal:  in  set up.  Action steps to achieve this goal:  1.  in law [place] and be with patient at assessment.  2.  in law will help complete all paper work.  3.  in law will report progress towards this goal at scheduled outreach telephone calls from the CCC team.    Discussed 8/24 with Suzy, scheduled follow up SW call to discuss alternative elderly waiver                                  Advance Care Plans/Directives Type:   No data recorded    My Medical and Care Information  Problem List   Patient Active Problem List   Diagnosis    Essential hypertension    Type 2 diabetes mellitus with both eyes affected by mild nonproliferative retinopathy without macular edema, without long-term current use of insulin (H)    Mixed hyperlipidemia    Stage 3a chronic kidney disease (H)    Diabetic polyneuropathy associated with type 2 diabetes mellitus (H)      Current Medications and Allergies:   Current Outpatient Medications   Medication Instructions    amLODIPine (NORVASC) 10 mg, Oral, DAILY    aspirin 81 mg, Oral, DAILY    atorvastatin (LIPITOR) 20 mg, Oral, DAILY    finasteride (PROSCAR) 5 mg, Oral, DAILY    lisinopril (ZESTRIL) 20 mg, Oral, DAILY    metFORMIN (GLUCOPHAGE) 1,000 mg, Oral, 2 TIMES DAILY WITH MEALS    tamsulosin (FLOMAX) 0.4 mg, Oral, DAILY         Care Coordination Start Date: 7/21/2023   Frequency of Care Coordination: monthly     Form Last Updated: 10/12/2023

## 2023-10-12 NOTE — PROGRESS NOTES
Care Coordination Clinician Chart Review    Situation: Patient chart reviewed by Care Coordinator.       Background: Care Coordination Program started: 7/21/2023. Initial assessment completed and patient-centered care plan(s) were developed with participation from patient. Lead CC handed patient off to CHW for continued outreaches.       Assessment: Per chart review, patient outreach completed by CC CHW on 10-3 leaving VM x1.  Patient is actively working to accomplish goal(s). Patient's goal(s) appropriate and relevant at this time. Patient is due for updated Plan of Care.  Assessments will be completed annually or as needed/with change of patient status.      Care Plan: Help At Home       Problem: Insufficient In-home support       Long-Range Goal: i will live safely in my home with support as needed.       Start Date: 7/27/2023 Expected End Date: 7/26/2024    This Visit's Progress: 10%    Priority: High    Note:     Barriers: may not recognize what is needed.   Strengths: family support.   Patient expressed understanding of goal: sister in law does  Action steps to achieve this goal:  1. Sister in law will talk to patient about supports to help at home.   2. Sister in law will arrange for supports if he is willing.  3. Sister hollis cook will report progress towards this goal at scheduled outreach telephone calls from the CCC team.    Discussed 8/24 with cole                            Care Plan: Medication Regimen       Problem: Medication Adherence       Long-Range Goal: Consistently take Medications as Prescribed       Start Date: 7/27/2023 Expected End Date: 7/26/2024    This Visit's Progress: 10%    Priority: High    Note:     Barriers: has not been taking meds for over a year.   Strengths: family can set up meds in pill boxes and monitor.  Patient expressed understanding of goal: sister in law does.  Action steps to achieve this goal:  1. Sister in law will work with pharmacy to get medications sent to her home.    2. Sister in law will set up pill boxes with patient.  3. Sister hollis cook will monitor and work with [facility] pharmacist as needed.  4. Sister hollis cook will report progress towards this goal at scheduled outreach telephone calls from the [facility] team.    8/24 discussed, HUMBERTO Almonte is setting up med box weekly, will be looking into new med box for 2 week duration.                            Care Plan: Specialty Referral       Problem: Patient is in need of specialty care       Goal: I will have neuro-psych appt to assess my cognition.       Start Date: 7/27/2023 Expected End Date: 1/1/2024    This Visit's Progress: 40%    Priority: Medium    Note:     Barriers:none noted.   Strengths: have had one done before and can hopefully go back to same clinic.  Family support.   Patient expressed understanding of goal: sister in law does.  Action steps to achieve this goal:  1. Sister in law will schedule appt.and will bring him to appt.   2. Sister hollis cook will report progress towards this goal at scheduled outreach telephone calls from the [facility] team.    8/24 discussed with HUMBERTO Almonte, was able to get in quick due to cancellation, first appt was on 8/17, needs MRI and EKG follow up appt is scheduled for 9/1/2023                              Care Plan: Community Resources       Problem: Lack of transportation               Problem: Insufficient In-home support       Long-Range Goal: [place] if I can get in home supports.       Start Date: 7/27/2023 Expected End Date: 7/26/2024    This Visit's Progress: 10%    Priority: Medium    Note:     Barriers: none noted  Strengths: family can help.   Patient expressed understanding of goal: sister in law set up.  Action steps to achieve this goal:  1. Sister in law will [place] and be with patient at assessment.  2. Sister hollis cook will help complete all paper work.  3. Sister hollis cook will report progress towards this goal at scheduled outreach telephone  calls from the CCC team.    Discussed 8/24 with Suzy, scheduled follow up SW call to discuss alternative elderly waiver                                  Plan/Recommendations: The patient will continue working with Care Coordination to achieve goal(s) as above. CHW will continue outreaches at minimum every 30 days and will involve Lead CC as needed or if patient is ready to move to Maintenance. Lead CC will continue to monitor CHW outreaches and patient's progress to goal(s) every 6 weeks.     Plan of Care updated and sent to patient: Yes, via [employer]

## 2023-10-16 ENCOUNTER — PATIENT OUTREACH (OUTPATIENT)
Dept: CARE COORDINATION | Facility: CLINIC | Age: 73
End: 2023-10-16
Payer: MEDICARE

## 2023-10-16 NOTE — PROGRESS NOTES
Clinic Care Coordination Contact  [facility]/Voicemail       Clinical Data: Care Coordinator Outreach  Outreach attempted x 1.  NO answer at time of CHW call today  Plan: Care Coordinator CHW to discuss and update current CC goal progression  Care Coordinator will try to reach patient again in 2 weeks= 10/30/2023.    Glenis ROSENBAUM  Community Health Worker  [facility] Care Coordination  [place] ValliantNasima [email or web address]  Office: [phone]

## 2023-10-31 ENCOUNTER — PATIENT OUTREACH (OUTPATIENT)
Dept: CARE COORDINATION | Facility: CLINIC | Age: 73
End: 2023-10-31
Payer: MEDICARE

## 2023-10-31 NOTE — LETTER
[facility] CARE COORDINATION  [address] [facility] [address]    October 31, 2023    Chemo Casillas  [address] [place] [address]      Dear Chemo and Suzy,    I have been attempting to reach you since our last contact. I would like to continue to work with you and provide any additional support you may need on achieving your health care related goals. I would appreciate if you would give me a call at [phone] to let me know if you would like to continue working together. I know that there are many things that can affect our ability to communicate and I hope we can continue to work together.    All of us at the [facility] are invested in your health and are here to assist you in meeting your goals.     Sincerely,    Glenis ROSENBAUM  Community Health Worker  [facility] Care Coordination  [email or web address]  Office: [phone]

## 2023-10-31 NOTE — PROGRESS NOTES
Clinic Care Coordination Contact  [facility]/Voicemail    Clinical Data: Care Coordinator Outreach    Outreach Documentation Number of Outreach Attempt   10/31/2023   1:48 PM 2       Left message on  patient's HUMBERTO Suzy's  voicemail with call back information and requested return call.    Plan: Care Coordinator will send unable to contact letter with care coordinator contact information via [employer]. Care Coordinator will try to reach patient again in 3 weeks= 11/21/2023.    CC Goals:  In home support  Med Mgmt/set-up  Neuro evaluation completed appts: 8/17 and 9/1/23  [place] Cho[place] Ass[place]ly d[place] ROSENBAUM  Community Health Worker  [facility] Care Coordination  [email or web address]  Office: [phone]

## 2023-11-21 ENCOUNTER — PATIENT OUTREACH (OUTPATIENT)
Dept: CARE COORDINATION | Facility: CLINIC | Age: 73
End: 2023-11-21
Payer: MEDICARE

## 2023-11-21 NOTE — PROGRESS NOTES
Clinic Care Coordination Contact  Community Health Worker Follow Up    Care Gaps:     Health Maintenance Due   Topic Date Due    ZOSTER IMMUNIZATION (1 of 2) Never done    RSV VACCINE (Pregnancy & 60+) (1 - 1-dose 60+ series) Never done    DTAP/TDAP/TD IMMUNIZATION (2 - Td or Tdap) 01/16/2018    COVID-19 Vaccine (7 - 2023-24 season) 09/15/2023       Care Gaps Last addressed on 11/21/23    Care Plan:   Care Plan: Help At Home       Problem: Insufficient In-home support       Long-Range Goal: i will live safely in my home with support as needed.       Start Date: 7/27/2023 Expected End Date: 7/26/2024    This Visit's Progress: 20% Recent Progress: 10%    Priority: High    Note:     Barriers: may not recognize what is needed.   Strengths: family support.   Patient expressed understanding of goal: sister in law does  Action steps to achieve this goal:  1. Sister in law will talk to patient about supports to help at home.   2. Sister in law will arrange for supports if he is willing.  3. Sister hollis cook will report progress towards this goal at scheduled outreach telephone calls from the CCC team.    Discussed 11/21 with [facility], family has come together to help with bills, de cluttering, organizing his house  Discussed 8/24 with [facility]                            Care Plan: Medication Regimen       Problem: Medication Adherence       Long-Range Goal: Consistently take Medications as Prescribed       Start Date: 7/27/2023 Expected End Date: 7/26/2024    This Visit's Progress: 30% Recent Progress: 10%    Priority: High    Note:     Barriers: has not been taking meds for over a year.   Strengths: family can set up meds in pill boxes and monitor.  Patient expressed understanding of goal: sister in law does.  Action steps to achieve this goal:  1. Sister in law will work with pharmacy to get medications sent to her home.   2. Sister in law will set up pill boxes with patient.  3. Sister hollis cook will monitor and work with [facility] pharmacist  as needed.  4. Sister hollis cook will report progress towards this goal at scheduled outreach telephone calls from the [facility] team.    11/21- discussed with Suzy- She is setting up medication box every Sunday 8/24 discussed, HUMBERTO Almonte is setting up med box weekly, will be looking into new med box for 2 week duration.                            Care Plan: Specialty Referral       Problem: Patient is in need of specialty care       Goal: I will have neuro-psych appt to assess my cognition.       Start Date: 7/27/2023 Expected End Date: 1/1/2024    This Visit's Progress: 60% Recent Progress: 40%    Priority: Medium    Note:     Barriers:none noted.   Strengths: have had one done before and can hopefully go back to same clinic.  Family support.   Patient expressed understanding of goal: sister in law does.  Action steps to achieve this goal:  1. Sister in law will schedule appt.and will bring him to appt.   2. Sister hollis cook will report progress towards this goal at scheduled outreach telephone calls from the CCC team.    11/21 completed eval, follow up appt scheduled 2/21/2024 8/24 discussed with HUMBERTO Almonte, was able to get in quick due to cancellation, first appt was on 8/17, needs MRI and EKG follow up appt is scheduled for 9/1/2023                              Care Plan: Community Resources       Problem: Lack of transportation               Problem: Insufficient In-home support       Long-Range Goal: [place] if I can get in home supports.       Start Date: 7/27/2023 Expected End Date: 7/26/2024    Recent Progress: 10%    Priority: Medium    Note:     Barriers: none noted  Strengths: family can help.   Patient expressed understanding of goal: sister in law set up.  Action steps to achieve this goal:  1. Sister in law will [place] and be with patient at assessment.  2. Sister hollis cook will help complete all paper work.  3. Sister hollis cook will report progress towards this goal at  scheduled outreach telephone calls from the CCC team.    Discussed 11/21 no update  Discussed 8/24 with Suzy, scheduled follow up SW call to discuss alternative elderly waiver                               Intervention and Education during outreach: Supportive Listening and Encouragement.  Next PCP appointment scheduled=3/4/2024    CHW spoke to Sandeep Almonte today for monthly CC outreach call, Suzy reports things are going very well.  Suzy informed CHW that family has really come together since Labor day to help Chemo- Family was able to help him with getting bills up to date, cleaning and organizing house. Family found out that no maintenance has been done to patient's car in over 3 years- family was able to get it all tuned up for him now.  HUMBERTO Suzy continues to setup medications every Sunday for patient. CHW goals were discussed and updated today. Denies any concerns or questions at this time.    CHW Plan: Continue with monthly outreach call to discuss, support and update CC goal progression    Next CHW outreach call: 12/20/2023    Glenis ROSENBAUM  Community Health Worker  [facility] Care Coordination  Kat Estrella [email or web address]  Office: [phone]

## 2023-11-21 NOTE — PROGRESS NOTES
Care Coordination Clinician Chart Review    Situation: Patient chart reviewed by Care Coordinator.       Background: Care Coordination Program started: 7/21/2023. Initial assessment completed and patient-centered care plan(s) were developed with participation from patient. Lead CC handed patient off to CHW for continued outreaches.       Assessment: Per chart review, patient outreach completed by CC CHW on 10- leaving VM x2.  Patient is actively working to accomplish goal(s). Patient's goal(s) appropriate and relevant at this time. Patient is not due for updated Plan of Care.  Assessments will be completed annually or as needed/with change of patient status.      Care Plan: Help At Home       Problem: Insufficient In-home support       Long-Range Goal: i will live safely in my home with support as needed.       Start Date: 7/27/2023 Expected End Date: 7/26/2024    This Visit's Progress: 10%    Priority: High    Note:     Barriers: may not recognize what is needed.   Strengths: family support.   Patient expressed understanding of goal: sister in law does  Action steps to achieve this goal:  1. Sister in law will talk to patient about supports to help at home.   2. Sister in law will arrange for supports if he is willing.  3. Sister hollis cook will report progress towards this goal at scheduled outreach telephone calls from the CCC team.    Discussed 8/24 with [facility]                            Care Plan: Medication Regimen       Problem: Medication Adherence       Long-Range Goal: Consistently take Medications as Prescribed       Start Date: 7/27/2023 Expected End Date: 7/26/2024    This Visit's Progress: 10%    Priority: High    Note:     Barriers: has not been taking meds for over a year.   Strengths: family can set up meds in pill boxes and monitor.  Patient expressed understanding of goal: sister in law does.  Action steps to achieve this goal:  1. Sister in law will work with pharmacy to get medications sent to  her home.   2. Sister in law will set up pill boxes with patient.  3. Sister hollis coko will monitor and work with [facility] pharmacist as needed.  4. Sister hollis cook will report progress towards this goal at scheduled outreach telephone calls from the [facility] team.    8/24 discussed, HUMBERTO Almonte is setting up med box weekly, will be looking into new med box for 2 week duration.                            Care Plan: Specialty Referral       Problem: Patient is in need of specialty care       Goal: I will have neuro-psych appt to assess my cognition.       Start Date: 7/27/2023 Expected End Date: 1/1/2024    This Visit's Progress: 40%    Priority: Medium    Note:     Barriers:none noted.   Strengths: have had one done before and can hopefully go back to same clinic.  Family support.   Patient expressed understanding of goal: sister in law does.  Action steps to achieve this goal:  1. Sister in law will schedule appt.and will bring him to appt.   2. Sister hollis cook will report progress towards this goal at scheduled outreach telephone calls from the [facility] team.    8/24 discussed with HUMBERTO Almonte, was able to get in quick due to cancellation, first appt was on 8/17, needs MRI and EKG follow up appt is scheduled for 9/1/2023                              Care Plan: Community Resources       Problem: Lack of transportation               Problem: Insufficient In-home support       Long-[place] Goal: [place] if [place] can get in home supports.       Start Date: 7/27/2023 Expected End Date: 7/26/2024    This Visit's Progress: 10%    Priority: Medium    Note:     Barriers: none noted  Strengths: family can help.   Patient expressed understanding of goal: sister in law set up.  Action steps to achieve this goal:  1. Sister in law will [place] and be with patient at assessment.  2. Sister hollis cook will help complete all paper work.  3. Sister hollis cook will report progress towards this goal at scheduled outreach  telephone calls from the CCC team.    Discussed 8/24 with Suzy, scheduled follow up SW call to discuss alternative elderly waiver                                  Plan/Recommendations: The patient will continue working with Care Coordination to achieve goal(s) as above. CHW will continue outreaches at minimum every 30 days and will involve Lead CC as needed or if patient is ready to move to Maintenance. Lead CC will continue to monitor CHW outreaches and patient's progress to goal(s) every 6 weeks.     Plan of Care updated and sent to patient: No

## 2023-12-20 ENCOUNTER — PATIENT OUTREACH (OUTPATIENT)
Dept: CARE COORDINATION | Facility: CLINIC | Age: 73
End: 2023-12-20
Payer: MEDICARE

## 2023-12-20 NOTE — PROGRESS NOTES
Clinic Care Coordination Contact  [facility]/Voicemail    Clinical Data: Care Coordinator Outreach    Outreach Documentation Number of Outreach Attempt   10/31/2023   1:48 PM 2   12/20/2023  10:44 AM 1       Left message on  patient's HUMBERTO Suzy  voicemail with call back information and requested return call.    Plan: Care Coordinator CHW to update and discuss current CC goal progression  Care Coordinator will try to reach patient again in 2 weeks due to holiday= 1/4/2024.    Glenis ROSENBAUM  Community Health Worker  [facility] Care Coordination  [email or web address]  Office: [phone]

## 2024-01-02 ENCOUNTER — PATIENT OUTREACH (OUTPATIENT)
Dept: CARE COORDINATION | Facility: CLINIC | Age: 74
End: 2024-01-02
Payer: MEDICARE

## 2024-01-02 NOTE — PROGRESS NOTES
Care Coordination Clinician Chart Review    Situation: Patient chart reviewed by Care Coordinator.       Background: Care Coordination Program started: 7/21/2023. Initial assessment completed and patient-centered care plan(s) were developed with participation from patient. Lead CC handed patient off to CHW for continued outreaches.       Assessment: Per chart review, patient outreach completed by CC CHW on 12- leaving VM x1.  Patient is actively working to accomplish goal(s). Patient's goal(s) appropriate and relevant at this time. Patient is due for updated Plan of Care.  Assessments will be completed annually or as needed/with change of patient status.      Care Plan: Help At Home       Problem: Insufficient In-home support       Long-Range Goal: i will live safely in my home with support as needed.       Start Date: 7/27/2023 Expected End Date: 7/26/2024    This Visit's Progress: 20% Recent Progress: 10%    Priority: High    Note:     Barriers: may not recognize what is needed.   Strengths: family support.   Patient expressed understanding of goal:  in law does  Action steps to achieve this goal:  1. Sister in law will talk to patient about supports to help at home.   2. Sister in law will arrange for supports if he is willing.  3. Sister in law will report progress towards this goal at scheduled outreach telephone calls from the CCC team.    Discussed 11/21 with [facility], family has come together to help with bills, de cluttering, organizing his house  Discussed 8/24 with [facility]                            Care Plan: Medication Regimen       Problem: Medication Adherence       Long-Range Goal: Consistently take Medications as Prescribed       Start Date: 7/27/2023 Expected End Date: 7/26/2024    This Visit's Progress: 30% Recent Progress: 10%    Priority: High    Note:     Barriers: has not been taking meds for over a year.   Strengths: family can set up meds in pill boxes and monitor.  Patient expressed  understanding of goal: sister in law does.  Action steps to achieve this goal:  1. Sister in law will work with pharmacy to get medications sent to her home.   2. Sister in law will set up pill boxes with patient.  3. Sister hollis cook will monitor and work with [facility] pharmacist as needed.  4. Sister hollis cook will report progress towards this goal at scheduled outreach telephone calls from the [facility] team.    11/21- discussed with Suzy- She is setting up medication box every Sunday 8/24 discussed, HUMBERTO Almonte is setting up med box weekly, will be looking into new med box for 2 week duration.                            Care Plan: Specialty Referral       Problem: Patient is in need of specialty care       Goal: I will have neuro-psych appt to assess my cognition.       Start Date: 7/27/2023 Expected End Date: 1/1/2024    This Visit's Progress: 60% Recent Progress: 40%    Priority: Medium    Note:     Barriers:none noted.   Strengths: have had one done before and can hopefully go back to same clinic.  Family support.   Patient expressed understanding of goal: sister in law does.  Action steps to achieve this goal:  1. Sister in law will schedule appt.and will bring him to appt.   2. Sister hollis cook will report progress towards this goal at scheduled outreach telephone calls from the CCC team.    11/21 completed eval, follow up appt scheduled 2/21/2024 8/24 discussed with HUMBERTO Almonte, was able to get in quick due to cancellation, first appt was on 8/17, needs MRI and EKG follow up appt is scheduled for 9/1/2023                              Care Plan: Community Resources       Problem: Lack of transportation               Problem: Insufficient In-home support       Long-Range Goal: [place] if [place] can get in home supports.       Start Date: 7/27/2023 Expected End Date: 7/26/2024    Recent Progress: 10%    Priority: Medium    Note:     Barriers: none noted  Strengths: family can help.   Patient expressed  understanding of goal:  in  set up.  Action steps to achieve this goal:  1.  in law [place] and be with patient at assessment.  2.  in law will help complete all paper work.  3.  in  will report progress towards this goal at scheduled outreach telephone calls from the CCC team.    Discussed 11/21 no update  Discussed 8/24 with Suzy, scheduled follow up SW call to discuss alternative elderly waiver                                  Plan/Recommendations: The patient will continue working with Care Coordination to achieve goal(s) as above. CHW will continue outreaches at minimum every 30 days and will involve Lead CC as needed or if patient is ready to move to Maintenance. Lead CC will continue to monitor CHW outreaches and patient's progress to goal(s) every 6 weeks.     Plan of Care updated and sent to patient: Yes, via [employer]

## 2024-01-02 NOTE — LETTER
[facility] CARE COORDINATION  [facility]  January 2, 2024        Chemo Casillas  [address]  [place] [address]          Dear Chemo,     Benito is an updated Complex Care Plan for your continued enrollment in Care Coordination. Please let us know if you have additional questions, concerns, or goals that we can assist with.    Sincerely,    Sparkle Salazar,   [facility]  [phone]        [facility]  Patient Centered Plan of Care  About Me:        Patient Name:  Chemo Casillas    YOB: 1950  Age:         73 year old   [place] MRN:    [id number] Telephone Information:  Home Phone [phone]   Mobile [phone]       Address:  [address]  [place] [address] Email address:  [email or web address]      Emergency Contact(s)    Name Relationship Lgl Grd Work Phone Home Phone Mobile Phone   1. SHRAVAN CLINE Sister-in-Law   [phone]           Primary language:  English     needed? No   [facility] [employer]:  [phone] op. 1  Other communication barriers:Access to technology    Preferred Method of Communication:     Current living arrangement: I live in a private home    Mobility Status/ Medical Equipment: Independent        Health Maintenance  Health Maintenance Reviewed: Due/Overdue   Health Maintenance Due   Topic Date Due    ZOSTER IMMUNIZATION (1 of 2) Never done    RSV VACCINE (Pregnancy & 60+) (1 - 1-dose 60+ series) Never done    DTAP/TDAP/TD IMMUNIZATION (2 - Td or Tdap) 01/16/2018    COVID-19 Vaccine (7 - 2023-24 season) 09/15/2023    PHQ-2 (once per calendar year)  01/01/2024           My Access Plan  Medical Emergency 911   Primary Clinic Line [facility] - [phone]   24 Hour Appointment Line [phone] or  [phone]) (toll-free)   24 Hour Nurse Line [phone] (toll-free)   Preferred Urgent Care [facility] [phone]      Preferred Hospital [facility]  [phone]     Preferred Pharmacy [id number] - [facility] [address] AT [facility] [address]     Behavioral Health Crisis Line The National Suicide Prevention Lifeline at 1-414.331.7880 or Text/Call 988           My Care Team Members  Patient Care Team         Relationship Specialty Notifications Start End    Cody Pacheco MD PCP - General   2/10/16     Phone: [phone] Fax: [phone] [address] [facility] [address]    Cody Pacheco MD Assigned PCP   4/3/22     Phone: [phone] Fax: [phone] [address] [facility] [address]    Sparkle Salazar LSW Lead Care Coordinator Primary Care - CC Admissions 7/25/23     Phone: [phone]         Farrah Perea CHW Community Health Worker  Admissions 7/27/23     Cody Mahmood MD MD Neurology  8/8/23     Phone: [phone] Fax: [phone] [address] [facility] [address]    Cody Mahmood MD Assigned Neuroscience Provider   8/12/23     Phone: [phone] Fax: [phone]         [address] [facility] [address]                My Care Plans  Self Management and Treatment Plan    Care Plan  Care Plan: Help At Home       Problem: Insufficient In-home support       Long-Range Goal: i will live safely in my home with support as needed.       Start Date: 7/27/2023 Expected End Date: 7/26/2024    This Visit's Progress: 20% Recent Progress: 10%    Priority: High    Note:     Barriers: may not recognize what is needed.   Strengths: family support.   Patient expressed understanding of goal: sister in law does  Action steps to achieve this goal:  1. Sister in law will talk to patient about supports to help at home.   2. Sister in law will arrange for supports if he is willing.  3. Sister in law will report progress towards this goal at scheduled outreach telephone calls from the CCC  team.    Discussed 11/21 with Suzy, family has come together to help with bills, de cluttering, organizing his house  Discussed 8/24 with suzy                            Care Plan: Medication Regimen       Problem: Medication Adherence       Long-Range Goal: Consistently take Medications as Prescribed       Start Date: 7/27/2023 Expected End Date: 7/26/2024    This Visit's Progress: 30% Recent Progress: 10%    Priority: High    Note:     Barriers: has not been taking meds for over a year.   Strengths: family can set up meds in pill boxes and monitor.  Patient expressed understanding of goal: sister in law does.  Action steps to achieve this goal:  1. Sister in law will work with pharmacy to get medications sent to her home.   2. Sister in law will set up pill boxes with patient.  3. Sister hollis cook will monitor and work with [place] pharmacist as needed.  4. Sister hollis cook will report progress towards this goal at scheduled outreach telephone calls from the [facility] team.    11/21- discussed with Suzy- She is setting up medication box every Sunday 8/24 discussed, HUMBERTO Suzy is setting up med box weekly, will be looking into new med box for 2 week duration.                            Care Plan: Specialty Referral       Problem: Patient is in need of specialty care       Goal: I will have neuro-psych appt to assess my cognition.       Start Date: 7/27/2023 Expected End Date: 1/1/2024    This Visit's Progress: 60% Recent Progress: 40%    Priority: Medium    Note:     Barriers:none noted.   Strengths: have had one done before and can hopefully go back to same clinic.  Family support.   Patient expressed understanding of goal: sister in law does.  Action steps to achieve this goal:  1. Sister in law will schedule appt.and will bring him to appt.   2. Sister hollis cook will report progress towards this goal at scheduled outreach telephone calls from the CCC team.    11/21 completed eval, follow up appt scheduled 2/21/2024 8/24 discussed with  HUMBERTO Suzy, was able to get in quick due to cancellation, first appt was on 8/17, needs MRI and EKG follow up appt is scheduled for 9/1/2023                              Care Plan: Community Resources       Problem: Lack of transportation               Problem: Insufficient In-home support       Long-Range Goal: [place] if [place] can get in home supports.       Start Date: 7/27/2023 Expected End Date: 7/26/2024    Recent Progress: 10%    Priority: Medium    Note:     Barriers: none noted  Strengths: family can help.   Patient expressed understanding of goal: sister in law set up.  Action steps to achieve this goal:  1. Sister in law will [place] and be with patient at assessment.  2.  in law will help complete all paper work.  3.  in law will report progress towards this goal at scheduled outreach telephone calls from the CCC team.    Discussed 11/21 no update  Discussed 8/24 with Suzy, scheduled follow up SW call to discuss alternative elderly waiver                               Advance Care Plans/Directives:   Advanced Care Plan/Directives on file:   No    Discussed with patient/caregiver(s): No data recorded           My Medical and Care Information  Problem List   Patient Active Problem List   Diagnosis    Essential hypertension    Type 2 diabetes mellitus with both eyes affected by mild nonproliferative retinopathy without macular edema, without long-term current use of insulin (H)    Mixed hyperlipidemia    Stage 3a chronic kidney disease (H)    Diabetic polyneuropathy associated with type 2 diabetes mellitus (H)      Current Medications and Allergies:    Current Outpatient Medications   Medication Instructions    amLODIPine (NORVASC) 10 mg, Oral, DAILY    aspirin 81 mg, Oral, DAILY    atorvastatin (LIPITOR) 20 mg, Oral, DAILY    finasteride (PROSCAR) 5 mg, Oral, DAILY    lisinopril (ZESTRIL) 20 mg, Oral, DAILY    metFORMIN (GLUCOPHAGE) 1,000 mg, Oral, 2  TIMES DAILY WITH MEALS    tamsulosin (FLOMAX) 0.4 mg, Oral, DAILY         Care Coordination Start Date: 7/21/2023   Frequency of Care Coordination: monthly, more frequently as needed     Form Last Updated: 01/02/2024

## 2024-01-04 ENCOUNTER — PATIENT OUTREACH (OUTPATIENT)
Dept: CARE COORDINATION | Facility: CLINIC | Age: 74
End: 2024-01-04
Payer: MEDICARE

## 2024-01-04 NOTE — LETTER
[facility] CARE COORDINATION  [address] [facility] [address]    January 4, 2024    Chemo Casillas  [address] [place] [address]      Dear Chemo and Suzy,    I have been attempting to reach you since our last contact. I would like to continue to work with you and provide any additional support you may need on achieving your health care related goals. I would appreciate if you would give me a call at [phone] to let me know if you would like to continue working together. I know that there are many things that can affect our ability to communicate and I hope we can continue to work together.    All of us at the [facility] are invested in your health and are here to assist you in meeting your goals.     Sincerely,    Glenis ROSENBAUM  Community Health Worker  [facility] Care Coordination  [email or web address]  Office: [phone]

## 2024-01-04 NOTE — PROGRESS NOTES
Clinic Care Coordination Contact  [facility]/Voicemail    Clinical Data: Care Coordinator Outreach    Outreach Documentation Number of Outreach Attempt   10/31/2023   1:48 PM 2   12/20/2023  10:44 AM 1   1/4/2024  12:51 PM 2       Left message on  patient's HUMBERTO Suzy's  voicemail with call back information and requested return call.    Plan: Care Coordinator will send unable to contact letter with care coordinator contact information via [employer]. Care Coordinator will try to reach patient again in 3 weeks= 1/31/2024.    Glenis ROSENBAUM  Community Health Worker  [facility] Care Coordination  [email or web address]  Office: [phone]

## 2024-02-05 ENCOUNTER — PATIENT OUTREACH (OUTPATIENT)
Dept: CARE COORDINATION | Facility: CLINIC | Age: 74
End: 2024-02-05
Payer: MEDICARE

## 2024-02-05 NOTE — PROGRESS NOTES
Clinic Care Coordination Contact  Community Health Worker Follow Up    Care Gaps:     Health Maintenance Due   Topic Date Due    ZOSTER IMMUNIZATION (1 of 2) Never done    RSV VACCINE (Pregnancy & 60+) (1 - 1-dose 60+ series) Never done    DTAP/TDAP/TD IMMUNIZATION (2 - Td or Tdap) 01/16/2018    COVID-19 Vaccine (7 - 2023-24 season) 09/15/2023    PHQ-2 (once per calendar year)  01/01/2024    A1C  01/21/2024       Care Gaps Last addressed on 2/5/2024    Care Plan:   Care Plan: Help At Home       Problem: Insufficient In-home support       Long-Range Goal: i will live safely in my home with support as needed.       Start Date: 7/27/2023 Expected End Date: 7/26/2024    This Visit's Progress: 30% Recent Progress: 20%    Priority: High    Note:     Barriers: may not recognize what is needed.   Strengths: family support.   Patient expressed understanding of goal: sister in law does  Action steps to achieve this goal:  1. Sister in law will talk to patient about supports to help at home.   2. Sister in law will arrange for supports if he is willing.  3. Sister hollis cook will report progress towards this goal at scheduled outreach telephone calls from the CCC team.    Discussed 2/5 with [facility] [place] [facility], family continues to help Chemo with house work etc.  Discussed 11/21 with [facility] [place] [facility], family has come together to help with bills, de cluttering, organizing his house  Discussed 8/24 with [facility]                            Care Plan: Medication Regimen       Problem: Medication Adherence       Long-Range Goal: Consistently take Medications as Prescribed       Start Date: 7/27/2023 Expected End Date: 7/26/2024    This Visit's Progress: 40% Recent Progress: 30%    Priority: High    Note:     Barriers: has not been taking meds for over a year.   Strengths: family can set up meds in pill boxes and monitor.  Patient expressed understanding of goal: sister in law does.  Action steps to achieve this goal:  1. Sister in law will work with pharmacy to get  medications sent to her home.   2. Sister in law will set up pill boxes with patient.  3. Sister hollis cook will monitor and work with [facility] pharmacist as needed.  4. Sister hollis cook will report progress towards this goal at scheduled outreach telephone calls from the [facility] team.    2/5/2024- discussed, Suzy continues to set up patient medications, patient remains compliant  11/21- discussed with Suzy- She is setting up medication box every Sunday 8/24 discussed, HUMBERTO Almonte is setting up med box weekly, will be looking into new med box for 2 week duration.                            Care Plan: Specialty Referral       Problem: Patient is in need of specialty care       Goal: I will have neuro-psych appt to assess my cognition.       Start Date: 7/27/2023 Expected End Date: 1/1/2024    This Visit's Progress: 70% Recent Progress: 60%    Priority: Medium    Note:     Barriers:none noted.   Strengths: have had one done before and can hopefully go back to same clinic.  Family support.   Patient expressed understanding of goal: sister in law does.  Action steps to achieve this goal:  1. Sister in law will schedule appt.and will bring him to appt.   2. Sister hollis cook will report progress towards this goal at scheduled outreach telephone calls from the [facility] team.    2/5/2024- follow up neuro appt on 2/21/2024 and 4/17/2024 11/21 completed eval, follow up appt scheduled 2/21/2024 8/24 discussed with HUMBERTO Almonte, was able to get in quick due to cancellation, first appt was on 8/17, needs MRI and EKG follow up appt is scheduled for 9/1/2023                              Care Plan: Community Resources       Problem: Lack of transportation               Problem: Insufficient In-home support       Long-Range Goal: [place] see if I can get in home supports.       Start Date: 7/27/2023 Expected End Date: 7/26/2024    Recent Progress: 10%    Priority: Medium    Note:     Barriers: none noted  Strengths: family can help.    Patient expressed understanding of goal:  in law set up.  Action steps to achieve this goal:  1.  in law will [place] and be with patient at assessment.  2.  in law will help complete all paper work.  3.  in law will report progress towards this goal at scheduled outreach telephone calls from the CCC team.    Dicussed- no update at this time  Discussed 11/21 no update  Discussed 8/24 with Suzy, scheduled follow up SW call to discuss alternative elderly waiver                               Intervention and Education during outreach: Supportive Listening and encouragement    CHW spoke to Chemo's sister in law Suzy today for monthly CC outreach call  Suzy states patient is doing very well, family continues to help Chemo with his house and was able to get his car fixed so he is able to to run his errands. Suzy continues to set up patient's medications, patient continues to be compliant.  Neurology appointment is scheduled for 2/21/2024.  Next PCP appointment is scheduled 3/4/2024 @ 10:20 am    CHW Plan: Continue with monthly outreach call to discuss, support and update CC goal progression    Next CHW outreach call: 3/12/2024   (Due to CHW out of the office)    Glenis ROSENBAUM  Community Health Worker  [facility] Care Coordination  Kat Estrella [email or web address] [email or web address]  Office: [phone]

## 2024-02-12 ENCOUNTER — PATIENT OUTREACH (OUTPATIENT)
Dept: CARE COORDINATION | Facility: CLINIC | Age: 74
End: 2024-02-12
Payer: MEDICARE

## 2024-02-12 NOTE — PROGRESS NOTES
Care Coordination Clinician Chart Review    Situation: Patient chart reviewed by Care Coordinator.       Background: Care Coordination Program started: 7/21/2023. Initial assessment completed and patient-centered care plan(s) were developed with participation from patient. Lead CC handed patient off to CHW for continued outreaches.       Assessment: Per chart review, patient outreach completed by CC CHW on 2-5-2024.  Patient is actively working to accomplish goal(s). Patient's goal(s) appropriate and relevant at this time. Patient is not due for updated Plan of Care.  Assessments will be completed annually or as needed/with change of patient status.      Care Plan: Help At Home       Problem: Insufficient In-home support       Long-Range Goal: i will live safely in my home with support as needed.       Start Date: 7/27/2023 Expected End Date: 7/26/2024    This Visit's Progress: 30% Recent Progress: 20%    Priority: High    Note:     Barriers: may not recognize what is needed.   Strengths: family support.   Patient expressed understanding of goal:  in  does  Action steps to achieve this goal:  1.  in  will talk to patient about supports to help at home.   2.  in  will arrange for supports if he is willing.  3.  in  will report progress towards this goal at scheduled outreach telephone calls from the CCC team.    Discussed 2/5 with [facility], family continues to help Chemo with house work etc.  Discussed 11/21 with [facility], family has come together to help with bills, de cluttering, organizing his house  Discussed 8/24 with [facility]                            Care Plan: Medication Regimen       Problem: Medication Adherence       Long-Range Goal: Consistently take Medications as Prescribed       Start Date: 7/27/2023 Expected End Date: 7/26/2024    This Visit's Progress: 40% Recent Progress: 30%    Priority: High    Note:     Barriers: has not been taking meds for over a year.   Strengths: family  can set up meds in pill boxes and monitor.  Patient expressed understanding of goal: sister in law does.  Action steps to achieve this goal:  1. Sister in law will work with pharmacy to get medications sent to her home.   2. Sister in law will set up pill boxes with patient.  3. Sister hollis cook will monitor and work with [facility] pharmacist as needed.  4. Sister hollis cook will report progress towards this goal at scheduled outreach telephone calls from the [facility] team.    2/5/2024- discussed, Suzy continues to set up patient medications, patient remains compliant  11/21- discussed with Suzy- She is setting up medication box every Sunday 8/24 discussed, HUMBERTO Almonte is setting up med box weekly, will be looking into new med box for 2 week duration.                            Care Plan: Specialty Referral       Problem: Patient is in need of specialty care       Goal: I will have neuro-psych appt to assess my cognition.       Start Date: 7/27/2023 Expected End Date: 1/1/2024    This Visit's Progress: 70% Recent Progress: 60%    Priority: Medium    Note:     Barriers:none noted.   Strengths: have had one done before and can hopefully go back to same clinic.  Family support.   Patient expressed understanding of goal: sister in law does.  Action steps to achieve this goal:  1. Sister in law will schedule appt.and will bring him to appt.   2. Sister hollis cook will report progress towards this goal at scheduled outreach telephone calls from the [facility] team.    2/5/2024- follow up neuro appt on 2/21/2024 and 4/17/2024 11/21 completed eval, follow up appt scheduled 2/21/2024 8/24 discussed with HUMBERTO Almonte, was able to get in quick due to cancellation, first appt was on 8/17, needs MRI and EKG follow up appt is scheduled for 9/1/2023                              Care Plan: Community Resources       Problem: Lack of transportation               Problem: Insufficient In-home support       Long-Range Goal: [place] if [place]  can get in home supports.       Start Date: 7/27/2023 Expected End Date: 7/26/2024    Recent Progress: 10%    Priority: Medium    Note:     Barriers: none noted  Strengths: family can help.   Patient expressed understanding of goal:  in law set up.  Action steps to achieve this goal:  1. Sister in law will [place] and be with patient at assessment.  2. Sister in law will help complete all paper work.  3. Sister in law will report progress towards this goal at scheduled outreach telephone calls from the CCC team.    Dicussed- no update at this time  Discussed 11/21 no update  Discussed 8/24 with Suzy, scheduled follow up SW call to discuss alternative elderly waiver                                  Plan/Recommendations: The patient will continue working with Care Coordination to achieve goal(s) as above. CHW will continue outreaches at minimum every 30 days and will involve Lead CC as needed or if patient is ready to move to Maintenance. Lead CC will continue to monitor CHW outreaches and patient's progress to goal(s) every 6 weeks.     Plan of Care updated and sent to patient: Sowmya

## 2024-02-21 ENCOUNTER — OFFICE VISIT (OUTPATIENT)
Dept: NEUROPSYCHOLOGY | Facility: CLINIC | Age: 74
End: 2024-02-21
Attending: PSYCHIATRY & NEUROLOGY
Payer: MEDICARE

## 2024-02-21 DIAGNOSIS — G31.84 MCI (MILD COGNITIVE IMPAIRMENT): ICD-10-CM

## 2024-02-21 DIAGNOSIS — I63.9 CEREBRAL ISCHEMIC STROKE DUE TO GLOBAL HYPOPERFUSION WITH WATERSHED INFARCT (H): ICD-10-CM

## 2024-02-21 DIAGNOSIS — F01.A0 MAJOR NEUROCOGNITIVE DISORDER, DUE TO VASCULAR DISEASE, WITHOUT BEHAVIORAL DISTURBANCE, MILD (H): Primary | ICD-10-CM

## 2024-02-21 PROCEDURE — 96133 NRPSYC TST EVAL PHYS/QHP EA: CPT | Performed by: CLINICAL NEUROPSYCHOLOGIST

## 2024-02-21 PROCEDURE — 96132 NRPSYC TST EVAL PHYS/QHP 1ST: CPT | Performed by: CLINICAL NEUROPSYCHOLOGIST

## 2024-02-21 PROCEDURE — 96116 NUBHVL XM PHYS/QHP 1ST HR: CPT | Performed by: CLINICAL NEUROPSYCHOLOGIST

## 2024-02-21 PROCEDURE — 96139 PSYCL/NRPSYC TST TECH EA: CPT | Performed by: CLINICAL NEUROPSYCHOLOGIST

## 2024-02-21 PROCEDURE — 96138 PSYCL/NRPSYC TECH 1ST: CPT | Performed by: CLINICAL NEUROPSYCHOLOGIST

## 2024-02-21 NOTE — LETTER
2/21/2024       RE: Chemo Casillas  [address] [place] [address]     Dear Colleague,    Thank you for referring your patient, Chemo Casillas, to the [facility] NEUROPSYCHOLOGY [facility] at [facility]. Please see a copy of my visit note below.    NEUROPSYCHOLOGY CONSULT  [facility]    NAME: Chemo Casillas    YOB: 1950   AGE: 74  EDU: 12  DATE OF EVALUATION: 2/21/2024    REASON FOR REFERRAL:  Mr. Casillas is a 74 year old, left-handed, White male presenting with concerns about cognitive functioning in the context of Hypertension, Hyperlipidemia, Diabetes, Cardiomyopathy, CKD and 2017 Neuropsychological evaluation notable for borderline intellectual functioning. He was referred for updated neurocognitive evaluation by his neurologist, Dr. Mahmood from [facility] to assist with differential diagnosis and care planning.     DIAGNOSTIC SUMMARY:  Due to recent increases in COVID-19 cases this assessment was conducted using face-to-face methods with PPE worn by the provider and examiner (but not by the patient). The standard administration of these tests involves in-person, direct face-to-face methods. The full impact of applying non-standard administration methods with PPE is not fully appreciated at this time. The diagnostic conclusions and recommendations provided in this report are being advanced with caution.    Results of testing indicate that Mr. Casillas is a gentleman of estimated borderline premorbid intellectual functioning whose performance across most cognitive domains assessed is largely consistent with his intellectual abilities including on measures of basic attention, cognitive efficiency, language, visual-spatial skills, verbal learning, verbal memory, and executive functioning.  Nonverbal learning and memory do represent a  relative weakness (moderate impairment).  Also of note, significant variability was evident on measures of cognitive efficiency (ranging from low average to moderately impaired), and executive functioning ranging from intact to severely impaired).  On a questionnaire designed to assess his awareness of a variety of health and safety scenarios, he scored in the mildly impaired range.  On self-report questionnaires assessing emotional functioning, he denies any significant symptoms of depression or anxiety.    As compared to previous testing conducted in 2017, I saw evidence of minimal changes and actually some evidence of improvement.  There were some declines in aspects of executive functioning but stability to improvement in others.  Performance on a measure of verbal learning actually improved although performance on a measure of nonverbal learning and memory declined.  While variability was evident both within the current evaluation and as compared to performances 7 years ago, for the most part, Mr. Casillas's cognitive profile is remarkably stable over time.    I do not see any compelling evidence to support the presence of a neurodegenerative condition.  Cognitive deficits are stable over the last 7 years.  I do not have a clear explanation for Mr. Casillas's declines in adaptive functioning as reported by his family, other than normal aging of an already compromised brain.    That said, Dr. Mahmood did recommend a trial of Keppra that could be considered now. 's family had said that they did not want to move forward with this as they were first addressing issues of incontinence last fall which has since improved significantly with medication.  His family did share today that there have been ongoing concerns with regular bathing and in general indicate that the level of independence he had several years ago including self-care, managing finances, managing medications have declined and they are now  providing support.  I again shared that my best explanation at this point would be normal aging of an already compromised brain.  I do not see any significant declines in memory or executive skills that could explain this decline in adaptive functioning.  Nor do I see any evidence of significant mood disturbance that could explain this decline. It is possible that the EEG abnormalities are picking up some type of spell that could be contributing to lapses in attention and focus that are not detectable on my cognitive assessment and this could be addressed with Keppra, but I feel that this is highly speculative at best.  But given that this recommendation for Keppra trial was given, they could consider doing that now before they see Dr. Mahmood again in April and see if there is any changes.    Assuming that Keppra does not help, this is the time to start thinking about continued supports for Mr. Casillas's day-to-day functioning.  His family are currently providing significant support by making sure bills are paid, setting up medications, and checking in on him regularly.  At this time he would likely benefit from a more structured living environment such as assisted living where these activities could be managed for him onsite.  In addition, such an environment would have the benefit of having regular meals provided for him, providing regular transportation so that he no longer has to worry about driving, as well as recreational and social opportunities.  Assisted living could also assist with bathing if that is a concern and provide increasing support as need arises as Mr. Casillas continues to age.    Mr. Casillas's family and providers should be reassured that I am not seeing evidence of a neurodegenerative process but he does have a pre-existing compromised brain.  He was previously diagnosed with borderline intellectual functioning, but given neuroimaging findings and history of stroke I think that his  presentation is best described as a Vascular Neurocognitive Disorder.  Given recent declines in ADLs, I would now describe this as a Major Vascular Neurocognitive Disorder;.  However, it is very important to note that this is not a new deficit but rather a longstanding compromise of cognitive functioning related to likely early neurologic insult.  The change in adaptive functioning does warrant a diagnosis of Major Neurocognitive Disorder which would be a change from 2017 (when adaptive functioning was intact) but I do not see a neurodegenerative process. I do think repeat evaluation in 1 to 2 years would be appropriate to monitor Mr. Casillas cognitive and adaptive functioning over time.     Summary for Patients  DIAGNOSTIC IMPRESSIONS (from 2/21/2024 Neuropsychology Consult):    Results  Moderate impairments in nonverbal learning and memory  Otherwise basic attention, speed of thinking, language, spatial skills, verbal learning, verbal memory, and problem solving skills are all consistent with his intellectual abilities  As compared to testing 7 years ago, there are declines on a measure of nonverbal learning and memory but otherwise scores are remarkably stable to improved (including on measures of verbal learning and memory)  There is no evidence of a neurodegenerative process such as Alzheimer's  Cognitive lapses in daily life best explained by normal aging on a brain that has already been compromised (history of early neurologic insult/ stroke)    Diagnosis  Major Vascular Neurocognitive Disorder   Borderline intellectual functioning    RECOMMENDATIONS:  Driving and Activities of Daily Living  Mr. Casillas would benefit from continued help in his daily activities particularly in terms of managing medications (i.e., having a pillbox set up for him and having someone make sure that he is taking his medications regularly and as prescribed) and with managing his finances.   It is strongly recommended that a  family member or close friend accompany Mr. Casillas to all appointments to ensure that accurate information is given to providers and instructions are followed. It will be helpful to present the information in brief, repeated segments and have him repeat back the information in his own words to ensure understanding.   If not already done, completion of paperwork for advance directives and assignment of healthcare and financial power of  should be considered at this time.  Mr. Casillas would benefit from increased supervision and support in his daily life so as to help him manage daily tasks such as cooking and cleaning as well as keeping track of medications and to ensure his personal safety. An assisted living facility would be ideal so as to allow him some independence while also providing a structured and supportive environment.    Physical and Emotional Health  Mr. Casillas is encouraged to move forward with the trial of Keppra as recommended by his neurologist  It is important that Mr. Casillas continue to adhere to his medication treatment regimen and follow a healthy diet so as to maintain his physical health, as this can have a significant impact on his physical, emotional, and cognitive functioning.   Under the guidance of his physician, It is recommended that regular exercise be integrated into Mr. Casillas's routine as it will likely benefit him cognitively and emotionally as well as physically.   Mr. Casillas does not appear to be struggling with any significant emotional symptoms. Behavioral activation techniques such as regular exercise (under the guidance of his physician), recreational activities and regular social interaction would likely be effective in helping Mr. Casillas to continue to maintain his mood.   Memory and Organization  Mr. Casillas is encouraged to continue to engage in stimulating activities, (i.e., card games, puzzles, projects) to keep him cognitively active.   In  his daily life, Mr. Casillas will continue to benefit from the use of compensation techniques. That is, he may find it helpful to post reminder notes around the house, make lists, and carry a small calendar so that he can feel more comfortable and confident in his ability to remember information. A daily planner could also be used as a memory book where important information is recorded and organized for future reference.   Mr. Casillas could also create a system to establish set locations for certain items (i.e., keys) such that he always knows where to put them upon entering the house and where to look when he needs them. If he would like to keep certain items out of sight (i.e., a wallet), he could set up a specific hidden place to keep items and use that same place so as to ensure he can find the required item when needed.   Follow-up  Re-evaluation in 1-2 years is recommended. The current test data can be used as an updated baseline to which future comparisons can be made.  (An order will need to be entered by his neurologist or primary care provider.)    FEEDBACK:  I was able to share the above results, impressions and recommendations with Mr. Casillas and his family (sister in law Suzy and sister Johana) on the day of testing. I provided the opportunity for them to ask questions about the evaluation and results. At the end of our meeting, they indicated that they understood the results and that I had answered all of their questions. They were encouraged to reach out to me (contact information included in the AVS) should any further questions or concerns arise in the future. (Mr. Casillas provided verbal consent for me to talk to Suzy or Johana if they should call with questions.) I explained that I would send the impressions and recommendations from the report as part of the After Visit Summary (which will be directed to clinic staff to print and send by mail) and that Dr. Mahmood would be receiving the full  "report as the consulting provider as would his PCP.     Thank you for allowing me to participate in Mr. Casillas's care.  Please contact me with any questions regarding the content of this report.      --------------------------------EXTENDED REPORT--------------------------------  Verbal consent for neuropsychological testing was received following the provision of information about the nature of the evaluation, and the opportunity to ask questions.     HISTORY OF PRESENTING PROBLEM:    Relevant History  Mr. Casillas completed a previous neuropsychological evaluation with Dr. Dalton Adorno on 11/08/17.  His report concluded that scores did not necessarily suggest acquired deficits but were most consistent with borderline intellectual functioning. He did state that he could not completely rule out a neurodegenerative process, but did feel that a lot of deficits on testing were more reflective of longstanding dysfunction (possibly associated with early neurologic insult).    More recently, Mr. Casillas was seen in Neurology by Dr. Mahmood on 8/11/23 due to concerns about cognitive decline. \"Family members feel that patient has progressed. Patient is not caring for his hygiene. Patient's house is not Clean watches TV all day does not leave the home. Family feels he should be in assisted living.\" During this visit, he obtained a score of 22/30 on the MoCA. Dr. Mahmood recommended updated MRI, EEG and repeat Neuropsychological testing.     In a follow-up with Dr. Mahmood on 9/1/23, he went over abnormal EEG results and \"Discussed with patient and family member at length about possibility of lowered seizure threshold. Talked about the option of adding Keppra due to the paroxysmal nature of the EEG to see if this would help stabilize things. Discussed at length that he may not have true epilepsy that this would be a trial of medication low-dose Keppra 250 mg twice daily.\"     In a 9/6/23 primary care appointment, \"We " "reviewed in detail neurology assessment and recommendations.  It seems very reasonable to try Keppra but the patient and his family are hesitant.  I also think it is reasonable to not start Keppra given his improvements with some organization and control of his blood pressure and other medical problems.  At this time they are going to hold off on starting Keppra but are not opposed to reconsidering this at some point in the future.\"    Current Interview  Mr. Casillas was accompanied by his sister in law Suzy and her sister Johana (who both see Mr. Casillas regularly)  and was an adequate historian. Together they provided the following information.     Mr. Casillas reports that his memory is good and denies any significant concerns noting that \"I enjoy what I am doing.\"  He acknowledges occasional forgetfulness for conversations but generally did not feel that he has had any significant changes in his day-to-day thinking skills.  He was aware of the ongoing COVID pandemic (although was not quite clear on how to say the name \"covat... cov-19\".  He was able to state it is a virus and knew that it had emerged in 2020.    His family shared that Mr. Casillas has been fairly independent and they had not been as involved until approximately last summer.  They realized ,after attending a primary care appointment, that he had not been taking his medications for about 1-1/2 years.  They had noticed changes in him in that he was not as active and seemed almost down without energy and seemed to be more confused.  His house has been increasingly cluttered and they also learned around this time that his cable had been turned off and his property taxes were not being paid and generally he was not paying his bills.  In addition, apparently his washer and dryer had not been working for a couple of years so his clothes were not being cleaned regularly.  They also found that he was not regularly servicing his car and it needed a lot " of repairs and basic services.  After that time his family became much more involved such that now they have set up all his bills on autopay, Suzy sets up a pillbox for him and checks each week and he appears to be taking them regularly.  They made a big effort to come in and declutter his house and get his car fixed all of this has been helpful and they feel he has been doing a lot better since that time. They do note that he seemed to be having more cognitive difficulties last summer before all these changes were made and now seems to be closer to his baseline.    With regard to the activities of daily living, Mr. Casillas reported that he currently lives in a home in [place] where he has spent his whole life.  He was able to report that he previously lived with his mother who passed in 2011.  He indicated that he does his own cooking, noting that he often makes pizza, potatoes, and vegetables and uses the stove or oven without difficulty.  His family later shared that he does cook his own meals but is very repetitive with what he cooks including pizza, chicken, and Swedish sausage often with potatoes but while he says it, they feel that he rarely eats vegetables.  When asked about his medications, Mr. Casillas indicated he takes them regularly and Suzy added that she sets up his pillbox for him each week and it appears that he now is taking them regularly.  He stated that his bills are set up on autopay and he continues to drive with no recent tickets, accidents, or incidents of becoming lost.  His family did share that they noticed in the last few months he was not going to the grocery store regularly and there was no consistent food in the house.  They have since learned that there was a change in the gas pump at the gas station that he could not figure out how to use properly.  Since they have addressed this issue by helping him learn it, he is now going to the store more regularly.    MEDICAL HISTORY:    Vinny's current problem list includes   Patient Active Problem List   Diagnosis    Essential hypertension    Type 2 diabetes mellitus with both eyes affected by mild nonproliferative retinopathy without macular edema, without long-term current use of insulin (H)    Mixed hyperlipidemia    Stage 3a chronic kidney disease (H)    Diabetic polyneuropathy associated with type 2 diabetes mellitus (H)     Mr. Casillas denied any history of stroke, seizure or head injury with loss of consciousness.  His family shared that since the 2017 workup, they now have come to understand that he likely experienced a stroke, possibly at birth, but are not aware of any new incidence of stroke, seizure or head injury with loss of consciousness.  His family shared he has not experienced COVID.    Mr. Casillas denies any significant sensory changes or disturbance in appetite or sleep.    Diagnostic studies:  An MRI of the brain dated 8/29/23 revealed:                IMPRESSION:  1.  No acute/subacute infarcts, mass lesions, hydrocephalus or MRI evidence of intracranial hemorrhage.  2.  Mild diffuse cerebral parenchymal volume loss. Presumed chronic hypertensive/microvascular ischemic white matter changes.  3.  Redemonstration of symmetric areas of encephalomalacia involving the bilateral frontoparietal and occipital regions in a watershed type distribution.     EEG 9/1/2023  Impression moderately abnormal awake EEG due to:  Rare paroxysmal burst of generalized delta slowing lasting 1-2 seconds without intermixed spikes  Clinical correlation  This could be indicative of lowered seizure threshold    Past Surgical History:   Procedure Laterality Date    NO PAST SURGERIES       Current medications include (per medical record):   Current Outpatient Medications:     amLODIPine (NORVASC) 10 MG tablet, Take 1 tablet (10 mg) by mouth daily, Disp: 90 tablet, Rfl: 4    aspirin 81 MG EC tablet, Take 1 tablet (81 mg) by mouth daily, Disp: 90  tablet, Rfl: 4    atorvastatin (LIPITOR) 20 MG tablet, Take 1 tablet (20 mg) by mouth daily, Disp: 90 tablet, Rfl: 4    finasteride (PROSCAR) 5 MG tablet, Take 1 tablet (5 mg) by mouth daily, Disp: 90 tablet, Rfl: 4    lisinopril (ZESTRIL) 20 MG tablet, Take 1 tablet (20 mg) by mouth daily, Disp: 90 tablet, Rfl: 4    metFORMIN (GLUCOPHAGE) 1000 MG tablet, Take 1 tablet (1,000 mg) by mouth 2 times daily (with meals), Disp: 180 tablet, Rfl: 4    tamsulosin (FLOMAX) 0.4 MG capsule, Take 1 capsule (0.4 mg) by mouth daily, Disp: 90 capsule, Rfl: 4.    RELEVANT FAMILY MEDICAL HISTORY:   They were unaware of any neurologic or neurodegenerative conditions in the family.  Mr. Casillas is left-handed and shared that his father was left-handed as are several members of his father's family.  His niece is also left-handed.    Other family medical history is positive for the following:  Family History   Problem Relation Age of Onset    Aortic Valve Replacement Mother              Melanoma Father              Mitral Valve Replacement Brother         , staph endocarditis     PSYCHIATRIC AND SUBSTANCE USE HISTORY:  With regard to his psychiatric history, Mr. Casillas denied a history of past psychiatric conditions or mental health treatment and his family agreed.  At the current time, he described his mood as good.  He denied any suicidal ideation, plan or intent or hallucinations or delusions.     With regard to substance use, Mr. Casillas denied a history of problematic alcohol use and his family agreed.  His family indicated that at most he has a beer a couple of times a year.  He denied any history of tobacco or recreational drug use and his family agreed.    SOCIAL HISTORY:  Mr. Casillas was born and raised in [place]. He shared that he was told that the umbilical cord was wrapped around his neck when he was born but was unaware of any further information.  He stated that school was initially a  challenge for him and he was held back in the first grade.  He indicated that he did attend speech therapy in elementary school but otherwise denied any formal interventions.  He stated that he graduated high school with a C average.  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  He worked there for a total of 15 years.  His family shared that a lot of this was part-time and he agreed.  He estimated that he retired somewhere between ages 61 and 63 but was not sure and neither was his family.    Mr. Casillas confirmed today that he has never been  and has no children.    BEHAVIORAL OBSERVATIONS:   Mr. Casillas arrived  25 minutes early and accompanied by his sister in law Suzy and her sister Johana  to today's appointment. He was appropriately dressed and groomed. He was alert and engaged during the interview. Gait was unremarkable. His mood was euthmyic and his affect was appropriately reactive. Rapport was easily established and eye contact was unremarkable. He was pleasant and cooperative. Rate, prosody, and content of speech were grossly normal. No significant word finding difficulties or paraphasic errors were evident. There was no evidence of a mika thought disorder; no hallucinations or delusions were apparent. Judgment and insight appeared fair.       Mr. Casillas appeared adequately motivated and engaged easily in the testing component of the evaluation. He attempted all tasks presented to him and worked at a slow but steady pace.  He often second-guesses his answers and apologized frequently when tasks were difficult or challenging and he did not always have a response. But he appeared to respond appropriately to encouragement from the examiner.  No significant  barriers to testing were observed and the following is judged to be a valid representation of Mr. Casillas's current cognitive strengths and weaknesses.    LIMITATIONS:  Due to recent rises in COVID-19 cases, this assessment was conducted using face-to-face testing with the examiner wearing [employer] designated PPE (but the patient did not wear a face mask). The standard administration of these procedures involves in-person, face-to-face methods without PPE. The impact of applying non-standard administration methods has been evaluated only in part by scientific research. While every effort was made to simulate standard assessment practices, the diagnostic conclusions and recommendations for treatment provided in this report are being advanced with these limitations in mind.    TESTS ADMINISTERED:   [place] Naming Test (BNT), DKEFS Verbal Fluency, Clock Drawing, Generalized Anxiety Disorder-7 (TAINA-7), Geriatric Depression Scale 30 (GDS), [facility] Health and Safety, Jesús Auditory Verbal Learning Test (RAVLT), Jesús-Osterrieth Complex Figure Test (RCFT), Stroop Color and Word Test, MEDRANO Tokens Form B, Wechsler Adult Intelligence Scale--Fourth Edition (WAIS-IV), WMS-IV Logical Memory, WRAT-5 Word Reading (blue), [place] Card Sorting Test-2 deck (WCST) and WMS-III Information and Orientation.    MOANS norms were used for BNT, RAVLT  (Raw scores in parentheses)    An attempt was made to match the battery administered in 2017.     DESCRIPTIVE PERFORMANCE KEY:    Labels for tests with Normal Distributions  Score Label Standard Score %ile Rank   Exceptionally high score  > 130 > 98   Above average score 120-129 91-97   High average score 110-119 75-90   Average score  25-74   Low average score 80-89 9-24   Below average score 70-79 2-8   Exceptionally low score < 70 < 2     Labels for tests with Non-Normal Distributions  Score Label %ile Rank   Within normal expectations/ limits score (WNL) > 24   Low average score  9-24   Below average score 2-8   Exceptionally low score < 2     The following test results utilize score labels as adapted from Cody Menard, Jan Samaniego, Whit Good, GEO Pimentel, Paige Lundberg, Zackary Prasad & Conference Participants (2020): American Academy of Clinical Neuropsychology consensus conference statement on uniform labeling of performance test scores, The Clinical Neuropsychologist, DOI: [email or web address]    All scores contain some measure of error; scores are reported here as they are obtained by the individual (without reference to the range of error). These are meant as labels and not interpretation of performance. While other relevant comments regarding task performance are provided below, please see the Assessment, Impressions and Diagnostic Summary sections of this report for interpretation of the scores and the cognitive profile as a whole, including what does and does not constitute impairment.    OPTIMAL PREMORBID INTELLECT:  Optimal premorbid intellectual abilities were estimated as falling in the below average (FSIQ = 76) range based on Mr. Casillas's educational and occupational histories and performance on tasks least likely to be affected by acquired brain dysfunction (i.e.,  hold tests ).  Of note overall IQ is very consistent with 2017 testing (FSIQ = 74).    SUMMARY OF TEST RESULTS:     Orientation, Attention and Processing Speed  Mental status exam was measured as a within normal limits score for his age (13). He was oriented to person, city, time, and date and was able to correctly name the current and previous presidents.  He knew that he was at a [facility] just not the specific name.  In 2017, he actually earned a below average score on this task and lost 3 points but information was not provided as to what he lost points for.    Overall working memory skills were assessed as an exceptionally low score (WMI =  66). Performance on a measure of basic attention and working memory was assessed as an exceptionally low score (12). This reflected a low average score for basic attention skills (LDF = 4) and working memory scores that ranged from a low average score (LDB = 3) to an exceptionally low score (LDS = 2).  While 2017 performance on the same task was also assessed as an exceptionally low score, performance was almost an entire standard deviation weaker.  Current performance on a mental arithmetic task was assessed as a below average score (7) and consistent with previous testing.    Overall performance on a measure of speeded processing was assessed as a below average score (PSI = 74).  This reflected an exceptionally low score on a symbol search task (6) but a low average score on a digit symbol substitution task (30).  In 2017 performance was slightly stronger (PSI = 81) reflecting a below average score on the symbol search task and an average score on the digit symbol substitution task.     Overall, as compared to testing in 2017, orientation has improved slightly, attention and working memory are stable to slightly improved and cognitive efficiency has declined slightly.    Language  Sight word reading skills (45) were assessed as a below average score and consistent with previous testing.  Overall verbal reasoning skills were assessed as a low average score (VCI = 85).  This reflects an average score for fund of general knowledge (15), a low average score for expressive vocabulary (19), and a below average score for verbal abstraction (11).  Performance on these 3 measures is largely stable as compared to 2017 testing.  His performance on a measure of semantic fluency was assessed as a low average score (24) representing a slight improvement from a previous below average score in 2017. Confrontation naming was measured as a within normal limits score (51) representing slight improvement from a previous below average  to low average score (48) in 2017.  His ability to follow a series of increasingly complex commands was assessed as an average score (43) and consistent with previous testing.    Overall, language skills are stable to slightly improved as compared to testing 7 years ago.     Visuospatial Skills  Nonverbal reasoning skills were overall as a low end of average score (LAURA = 90).  This reflects an average score for both a block construction task (24) and a pattern completion task (10), and a low average score for a visual puzzles task (6).  In 2017 the block construction task had also been assessed as an average score and the visual puzzles task as a low average score, but the pattern completion task had been slightly weaker and measured as a low average score.    Nonverbal reasoning skills have improved very slightly as compared to previous testing.    Learning and Memory  On a list learning task, overall learning for a 15 item word list was measured as an average score (2,4,2,6,8), albeit with a variable learning curve. After a brief delay, he was able to retain 6 words (an average score) for immediate recall performance. After a longer 30 minute delay, he was able to retain 7 words (an average score) for delayed free recall performance. Good benefit was obtained from recognition cues as he correctly identified 15 words and made just 1 false positive error.  In 2017, performance was very similar with overall average learning, immediate recall and delayed recall.  However recognition performance at that time was notable for also identifying all of the correct words but making 8 false positive errors.    Immediate memory for two short stories was measured as an average score (24). Delayed recall of these stories resulted in an average score (18, 90% retention). Recognition memory was measured as an average score (18/23).  In 2017, immediate recall for the same stories was assessed as a below average score but with an  average score for delayed recall.  Recognition memory had been slightly weaker at that time and assessed as a low average score.    Immediate recall of a complex figure was assessed as a below average score (3.5).  Delayed recall of this figure was assessed as an exceptionally low score (3.5), although notable for 100% retention of the limited information he did learn.  Of note, his copy of the figure was notable for poor planning, significant difficulty integrating detail, and a piecemeal approach which likely interfered with encoding.  Recognition memory was assessed as an exceptionally low score (13).  In 2017, immediate and delayed recall of the same figure was assessed as an average score and a low average score respectively.  Of note, his copy was stronger at that time although still well below expectation for his age.    Overall, as compared to 2017, rote verbal learning and verbal memory are stable, and prose learning has improved substantially.  Nonverbal learning and memory have declined but this was likely impacted by his original difficulties copying a complex figure which likely interfered with encoding.    Executive Functioning  Working memory skills ranged from a low average score (LDB = 3) to an exceptionally low score (LDS = 2) on a digit repetition task and were assessed as a below average score on a mental arithmetic task.  In 2017, working memory scores also ranged from an exceptionally low score to a below average score. His performance on a measure of phonemic fluency resulted in an exceptionally low score (6) representing a decline from a previous below average score in 2017. On a measure of deductive reasoning and problem-solving, overall performance was assessed as a within normal limits score for his age and education in terms of the number of categories learned (2). His performance was characterized by a highly error prone response style (exceptionally low score, NPE= 47, ND= 34). In 2017,  he also learned categories but was disrupted by both an error prone and perseverative response style. His copy of a complex figure was performed as an exceptionally low score for his age (13.5) and notable for poor planning, significant difficulty integrating detail, and a piecemeal approach.  In 2017 he also earned an exceptionally low score on this task, but with much stronger raw score performance (23.5). His drawing of a clock was unremarkable.     Performance on measures of executive functioning were stable to slightly reduced as compared to 2017 testing.    Adaptive and Emotional Functioning  On the ILS health and safety questions, he earned a below average score (30) and his responses were most notable for being somewhat vague at times and not being able to elaborate more fully, but not necessarily an overtly incorrect or inappropriate response.  He earned the exact same score in 2017.    On the Geriatric Depression Scale-30 (GDS), a self report measure of depressive symptomatology, he obtained a score of 3, placing him in the range of no significant symptoms of depression.     On the Generalized Anxiety Disorder-7, a self-report measure of anxiety, he obtained a score of 0, placing him in the range of minimal anxiety.     EVALUATION SERVICES AND TIME:   A clinical interview/neurobehavioral status examination was conducted with the patient and documented. I thoroughly reviewed the medical record, selected the neuropsychological test battery, provided supervision to the individual who administered and scored the neuropsychological test battery, interpreted/integrated patient data and test results, engaged in clinical decision making, treatment planning, report writing/preparation, and provided and documented interactive feedback of test results on the day of testing . A trained examiner/technician directly administered and scored 2+ neuropsychological tests. Please see below for a breakdown of time spent and  the associated codes billed for these services.     Services   Time Spent  CPT Codes   Neurobehavioral Status Exam:  (e.g., face-to-face, interpretation, report) 60 minutes 1 x 96116   Neuropsychological Evaluation Services:   (e.g., integration, interpretation, treatment planning, clinical decision making, feedback)   185 minutes   1 x 96132  2 x 96133        Neuropsychological Testing by Trained Examiner/Technician:  (e.g., test administration, scoring, 2+ tests administered)   275 minutes   1 x 96138  8 x 96139     Diagnosis:  Major Vascular Neurocognitive Disorder   Borderline intellectual functioning    For diagnostic and coding purposes, Mr. 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 Feedback of results was provided on the day of testing.       Coco Pacheco, PhD, LP, [facility]  Clinical Neuropsychologist, LP#[id number]  Board Certified in Clinical Neuropsychology    [facility], [address]  [place] [address]  Phone:  [phone]    The patient was seen for a neuropsychological evaluation for the purposes of diagnostic clarification and treatment planning. 185 minutes of face-to-face testing were provided by this writer. An additional 90 minutes were spent scoring and compiling test results. The patient was cooperative with testing. No concerns were brought to my attention. Please see Dr. Pacheco's report for a detailed description of the charges and interpretation and integration of the findings.

## 2024-02-21 NOTE — PROGRESS NOTES
NEUROPSYCHOLOGY CONSULT  [facility]    NAME: Chemo Casillas    YOB: 1950   AGE: 74  EDU: 12  DATE OF EVALUATION: 2/21/2024    REASON FOR REFERRAL:  Mr. Casillas is a 74 year old, left-handed, White male presenting with concerns about cognitive functioning in the context of Hypertension, Hyperlipidemia, Diabetes, Cardiomyopathy, CKD and 2017 Neuropsychological evaluation notable for borderline intellectual functioning. He was referred for updated neurocognitive evaluation by his neurologist, Dr. Mahmood from [facility] to assist with differential diagnosis and care planning.     DIAGNOSTIC SUMMARY:  Due to recent increases in COVID-19 cases this assessment was conducted using face-to-face methods with PPE worn by the provider and examiner (but not by the patient). The standard administration of these tests involves in-person, direct face-to-face methods. The full impact of applying non-standard administration methods with PPE is not fully appreciated at this time. The diagnostic conclusions and recommendations provided in this report are being advanced with caution.    Results of testing indicate that Mr. Casillas is a gentleman of estimated borderline premorbid intellectual functioning whose performance across most cognitive domains assessed is largely consistent with his intellectual abilities including on measures of basic attention, cognitive efficiency, language, visual-spatial skills, verbal learning, verbal memory, and executive functioning.  Nonverbal learning and memory do represent a relative weakness (moderate impairment).  Also of note, significant variability was evident on measures of cognitive efficiency (ranging from low average to moderately impaired), and executive functioning ranging from intact to severely impaired).  On a questionnaire designed to assess his awareness of a variety of health and safety  scenarios, he scored in the mildly impaired range.  On self-report questionnaires assessing emotional functioning, he denies any significant symptoms of depression or anxiety.    As compared to previous testing conducted in 2017, I saw evidence of minimal changes and actually some evidence of improvement.  There were some declines in aspects of executive functioning but stability to improvement in others.  Performance on a measure of verbal learning actually improved although performance on a measure of nonverbal learning and memory declined.  While variability was evident both within the current evaluation and as compared to performances 7 years ago, for the most part, Mr. Casillas's cognitive profile is remarkably stable over time.    I do not see any compelling evidence to support the presence of a neurodegenerative condition.  Cognitive deficits are stable over the last 7 years.  I do not have a clear explanation for Mr. Casillas's declines in adaptive functioning as reported by his family, other than normal aging of an already compromised brain.    That said, Dr. Mahmood did recommend a trial of Keppra that could be considered now. 's family had said that they did not want to move forward with this as they were first addressing issues of incontinence last fall which has since improved significantly with medication.  His family did share today that there have been ongoing concerns with regular bathing and in general indicate that the level of independence he had several years ago including self-care, managing finances, managing medications have declined and they are now providing support.  I again shared that my best explanation at this point would be normal aging of an already compromised brain.  I do not see any significant declines in memory or executive skills that could explain this decline in adaptive functioning.  Nor do I see any evidence of significant mood disturbance that could explain  this decline. It is possible that the EEG abnormalities are picking up some type of spell that could be contributing to lapses in attention and focus that are not detectable on my cognitive assessment and this could be addressed with Keppra, but I feel that this is highly speculative at best.  But given that this recommendation for Keppra trial was given, they could consider doing that now before they see Dr. Mahmood again in April and see if there is any changes.    Assuming that Keppra does not help, this is the time to start thinking about continued supports for Mr. Casillas's day-to-day functioning.  His family are currently providing significant support by making sure bills are paid, setting up medications, and checking in on him regularly.  At this time he would likely benefit from a more structured living environment such as assisted living where these activities could be managed for him onsite.  In addition, such an environment would have the benefit of having regular meals provided for him, providing regular transportation so that he no longer has to worry about driving, as well as recreational and social opportunities.  Assisted living could also assist with bathing if that is a concern and provide increasing support as need arises as Mr. Casillas continues to age.    Mr. Casillas's family and providers should be reassured that I am not seeing evidence of a neurodegenerative process but he does have a pre-existing compromised brain.  He was previously diagnosed with borderline intellectual functioning, but given neuroimaging findings and history of stroke I think that his presentation is best described as a Vascular Neurocognitive Disorder.  Given recent declines in ADLs, I would now describe this as a Major Vascular Neurocognitive Disorder;.  However, it is very important to note that this is not a new deficit but rather a longstanding compromise of cognitive functioning related to likely early  neurologic insult.  The change in adaptive functioning does warrant a diagnosis of Major Neurocognitive Disorder which would be a change from 2017 (when adaptive functioning was intact) but I do not see a neurodegenerative process. I do think repeat evaluation in 1 to 2 years would be appropriate to monitor Mr. Casillas cognitive and adaptive functioning over time.     Summary for Patients  DIAGNOSTIC IMPRESSIONS (from 2/21/2024 Neuropsychology Consult):    Results  Moderate impairments in nonverbal learning and memory  Otherwise basic attention, speed of thinking, language, spatial skills, verbal learning, verbal memory, and problem solving skills are all consistent with his intellectual abilities  As compared to testing 7 years ago, there are declines on a measure of nonverbal learning and memory but otherwise scores are remarkably stable to improved (including on measures of verbal learning and memory)  There is no evidence of a neurodegenerative process such as Alzheimer's  Cognitive lapses in daily life best explained by normal aging on a brain that has already been compromised (history of early neurologic insult/ stroke)    Diagnosis  Major Vascular Neurocognitive Disorder   Borderline intellectual functioning    RECOMMENDATIONS:  Driving and Activities of Daily Living  Mr. Casillas would benefit from continued help in his daily activities particularly in terms of managing medications (i.e., having a pillbox set up for him and having someone make sure that he is taking his medications regularly and as prescribed) and with managing his finances.   It is strongly recommended that a family member or close friend accompany Mr. Casillas to all appointments to ensure that accurate information is given to providers and instructions are followed. It will be helpful to present the information in brief, repeated segments and have him repeat back the information in his own words to ensure understanding.   If not  already done, completion of paperwork for advance directives and assignment of healthcare and financial power of  should be considered at this time.  Mr. Casillas would benefit from increased supervision and support in his daily life so as to help him manage daily tasks such as cooking and cleaning as well as keeping track of medications and to ensure his personal safety. An assisted living facility would be ideal so as to allow him some independence while also providing a structured and supportive environment.    Physical and Emotional Health  Mr. Casillas is encouraged to move forward with the trial of Keppra as recommended by his neurologist  It is important that Mr. Casillas continue to adhere to his medication treatment regimen and follow a healthy diet so as to maintain his physical health, as this can have a significant impact on his physical, emotional, and cognitive functioning.   Under the guidance of his physician, It is recommended that regular exercise be integrated into Mr. Casillas's routine as it will likely benefit him cognitively and emotionally as well as physically.   Mr. Casillas does not appear to be struggling with any significant emotional symptoms. Behavioral activation techniques such as regular exercise (under the guidance of his physician), recreational activities and regular social interaction would likely be effective in helping Mr. Casillas to continue to maintain his mood.   Memory and Organization  Mr. Casillas is encouraged to continue to engage in stimulating activities, (i.e., card games, puzzles, projects) to keep him cognitively active.   In his daily life, Mr. Casillas will continue to benefit from the use of compensation techniques. That is, he may find it helpful to post reminder notes around the house, make lists, and carry a small calendar so that he can feel more comfortable and confident in his ability to remember information. A daily planner could also be  used as a memory book where important information is recorded and organized for future reference.   Mr. Casillas could also create a system to establish set locations for certain items (i.e., keys) such that he always knows where to put them upon entering the house and where to look when he needs them. If he would like to keep certain items out of sight (i.e., a wallet), he could set up a specific hidden place to keep items and use that same place so as to ensure he can find the required item when needed.   Follow-up  Re-evaluation in 1-2 years is recommended. The current test data can be used as an updated baseline to which future comparisons can be made.  (An order will need to be entered by his neurologist or primary care provider.)    FEEDBACK:  I was able to share the above results, impressions and recommendations with Mr. Casillas and his family (sister in law Suzy and sister Johana) on the day of testing. I provided the opportunity for them to ask questions about the evaluation and results. At the end of our meeting, they indicated that they understood the results and that I had answered all of their questions. They were encouraged to reach out to me (contact information included in the AVS) should any further questions or concerns arise in the future. (Mr. Casillas provided verbal consent for me to talk to Suzy or Johana if they should call with questions.) I explained that I would send the impressions and recommendations from the report as part of the After Visit Summary (which will be directed to clinic staff to print and send by mail) and that Dr. Mahmood would be receiving the full report as the consulting provider as would his PCP.     Thank you for allowing me to participate in Mr. Casillas's care.  Please contact me with any questions regarding the content of this report.      --------------------------------EXTENDED REPORT--------------------------------  Verbal consent for neuropsychological testing  "was received following the provision of information about the nature of the evaluation, and the opportunity to ask questions.     HISTORY OF PRESENTING PROBLEM:    Relevant History  Mr. Casillas completed a previous neuropsychological evaluation with Dr. Dalton Adorno on 11/08/17.  His report concluded that scores did not necessarily suggest acquired deficits but were most consistent with borderline intellectual functioning. He did state that he could not completely rule out a neurodegenerative process, but did feel that a lot of deficits on testing were more reflective of longstanding dysfunction (possibly associated with early neurologic insult).    More recently, Mr. Casillas was seen in Neurology by Dr. Mahmood on 8/11/23 due to concerns about cognitive decline. \"Family members feel that patient has progressed. Patient is not caring for his hygiene. Patient's house is not Clean watches TV all day does not leave the home. Family feels he should be in assisted living.\" During this visit, he obtained a score of 22/30 on the MoCA. Dr. Mahmood recommended updated MRI, EEG and repeat Neuropsychological testing.     In a follow-up with Dr. Mahmood on 9/1/23, he went over abnormal EEG results and \"Discussed with patient and family member at length about possibility of lowered seizure threshold. Talked about the option of adding Keppra due to the paroxysmal nature of the EEG to see if this would help stabilize things. Discussed at length that he may not have true epilepsy that this would be a trial of medication low-dose Keppra 250 mg twice daily.\"     In a 9/6/23 primary care appointment, \"We reviewed in detail neurology assessment and recommendations.  It seems very reasonable to try Keppra but the patient and his family are hesitant.  I also think it is reasonable to not start Keppra given his improvements with some organization and control of his blood pressure and other medical problems.  At this time they are going " "to hold off on starting Keppra but are not opposed to reconsidering this at some point in the future.\"    Current Interview  Mr. Casillas was accompanied by his sister in law Suzy and her sister Johana (who both see Mr. Casillas regularly)  and was an adequate historian. Together they provided the following information.     Mr. Casillas reports that his memory is good and denies any significant concerns noting that \"I enjoy what I am doing.\"  He acknowledges occasional forgetfulness for conversations but generally did not feel that he has had any significant changes in his day-to-day thinking skills.  He was aware of the ongoing COVID pandemic (although was not quite clear on how to say the name \"covat... cov-19\".  He was able to state it is a virus and knew that it had emerged in 2020.    His family shared that Mr. Casillas has been fairly independent and they had not been as involved until approximately last summer.  They realized ,after attending a primary care appointment, that he had not been taking his medications for about 1-1/2 years.  They had noticed changes in him in that he was not as active and seemed almost down without energy and seemed to be more confused.  His house has been increasingly cluttered and they also learned around this time that his cable had been turned off and his property taxes were not being paid and generally he was not paying his bills.  In addition, apparently his washer and dryer had not been working for a couple of years so his clothes were not being cleaned regularly.  They also found that he was not regularly servicing his car and it needed a lot of repairs and basic services.  After that time his family became much more involved such that now they have set up all his bills on [employer], Suzy sets up a pillbox for him and checks each week and he appears to be taking them regularly.  They made a big effort to come in and declutter his house and get his car fixed all of this has " been helpful and they feel he has been doing a lot better since that time. They do note that he seemed to be having more cognitive difficulties last summer before all these changes were made and now seems to be closer to his baseline.    With regard to the activities of daily living, Mr. Casillas reported that he currently lives in a home in [place] [facility] [facility] where he has spent his whole life.  He was able to report that he previously lived with his mother who passed in 2011.  He indicated that he does his own cooking, noting that he often makes pizza, potatoes, and vegetables and uses the stove or oven without difficulty.  His family later shared that he does cook his own meals but is very repetitive with what he cooks including pizza, chicken, and Swedish sausage often with potatoes but while he says it, they feel that he rarely eats vegetables.  When asked about his medications, Mr. Casillas indicated he takes them regularly and Suzy added that she sets up his pillbox for him each week and it appears that he now is taking them regularly.  He stated that his bills are set up on [employer] and he continues to drive with no recent tickets, accidents, or incidents of becoming lost.  His family did share that they noticed in the last few months he was not going to the grocery store regularly and there was no consistent food in the house.  They have since learned that there was a change in the gas pump at the gas station that he could not figure out how to use properly.  Since they have addressed this issue by helping him learn it, he is now going to the store more regularly.    MEDICAL HISTORY:  Mr. Casillas's current problem list includes   Patient Active Problem List   Diagnosis    Essential hypertension    Type 2 diabetes mellitus with both eyes affected by mild nonproliferative retinopathy without macular edema, without long-term current use of insulin (H)    Mixed hyperlipidemia    Stage 3a chronic kidney disease (H)     Diabetic polyneuropathy associated with type 2 diabetes mellitus (H)     Mr. Casillas denied any history of stroke, seizure or head injury with loss of consciousness.  His family shared that since the 2017 workup, they now have come to understand that he likely experienced a stroke, possibly at birth, but are not aware of any new incidence of stroke, seizure or head injury with loss of consciousness.  His family shared he has not experienced COVID.    Mr. Casillas denies any significant sensory changes or disturbance in appetite or sleep.    Diagnostic studies:  An MRI of the brain dated 8/29/23 revealed:                IMPRESSION:  1.  No acute/subacute infarcts, mass lesions, hydrocephalus or MRI evidence of intracranial hemorrhage.  2.  Mild diffuse cerebral parenchymal volume loss. Presumed chronic hypertensive/microvascular ischemic white matter changes.  3.  Redemonstration of symmetric areas of encephalomalacia involving the bilateral frontoparietal and occipital regions in a watershed type distribution.     EEG 9/1/2023  Impression moderately abnormal awake EEG due to:  Rare paroxysmal burst of generalized delta slowing lasting 1-2 seconds without intermixed spikes  Clinical correlation  This could be indicative of lowered seizure threshold    Past Surgical History:   Procedure Laterality Date    NO PAST SURGERIES       Current medications include (per medical record):   Current Outpatient Medications:     amLODIPine (NORVASC) 10 MG tablet, Take 1 tablet (10 mg) by mouth daily, Disp: 90 tablet, Rfl: 4    aspirin 81 MG EC tablet, Take 1 tablet (81 mg) by mouth daily, Disp: 90 tablet, Rfl: 4    atorvastatin (LIPITOR) 20 MG tablet, Take 1 tablet (20 mg) by mouth daily, Disp: 90 tablet, Rfl: 4    finasteride (PROSCAR) 5 MG tablet, Take 1 tablet (5 mg) by mouth daily, Disp: 90 tablet, Rfl: 4    lisinopril (ZESTRIL) 20 MG tablet, Take 1 tablet (20 mg) by mouth daily, Disp: 90 tablet, Rfl: 4    metFORMIN  (GLUCOPHAGE) 1000 MG tablet, Take 1 tablet (1,000 mg) by mouth 2 times daily (with meals), Disp: 180 tablet, Rfl: 4    tamsulosin (FLOMAX) 0.4 MG capsule, Take 1 capsule (0.4 mg) by mouth daily, Disp: 90 capsule, Rfl: 4.    RELEVANT FAMILY MEDICAL HISTORY:   They were unaware of any neurologic or neurodegenerative conditions in the family.  Mr. Casillsa is left-handed and shared that his father was left-handed as are several members of his father's family.  His niece is also left-handed.    Other family medical history is positive for the following:  Family History   Problem Relation Age of Onset    Aortic Valve Replacement Mother              Melanoma Father              Mitral Valve Replacement Brother         , staph endocarditis     PSYCHIATRIC AND SUBSTANCE USE HISTORY:  With regard to his psychiatric history, Mr. Casillas denied a history of past psychiatric conditions or mental health treatment and his family agreed.  At the current time, he described his mood as good.  He denied any suicidal ideation, plan or intent or hallucinations or delusions.     With regard to substance use, Mr. Casillas denied a history of problematic alcohol use and his family agreed.  His family indicated that at most he has a beer a couple of times a year.  He denied any history of tobacco or recreational drug use and his family agreed.    SOCIAL HISTORY:  Mr. Casillas was born and raised in [place] [facility] [place] [place] He shared that he was told that the umbilical cord was wrapped around his neck when he was born but was unaware of any further information.  He stated that school was initially a challenge for him and he was held back in the first grade.  He indicated that he did attend speech therapy in elementary school but otherwise denied any formal interventions.  He stated that he graduated high school with a C average.  He then attended a [employer] program for [employer] work and worked for about 5 years in this  field.  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  He worked there for a total of 15 years.  His family shared that a lot of this was part-time and he agreed.  He estimated that he retired somewhere between ages 61 and 63 but was not sure and neither was his family.    Mr. Casillas confirmed today that he has never been  and has no children.    BEHAVIORAL OBSERVATIONS:   Mr. Casillas arrived  25 minutes early and accompanied by his sister in law Suzy and her sister Johana  to today's appointment. He was appropriately dressed and groomed. He was alert and engaged during the interview. Gait was unremarkable. His mood was euthmyic and his affect was appropriately reactive. Rapport was easily established and eye contact was unremarkable. He was pleasant and cooperative. Rate, prosody, and content of speech were grossly normal. No significant word finding difficulties or paraphasic errors were evident. There was no evidence of a mika thought disorder; no hallucinations or delusions were apparent. Judgment and insight appeared fair.       Mr. Casillas appeared adequately motivated and engaged easily in the testing component of the evaluation. He attempted all tasks presented to him and worked at a slow but steady pace.  He often second-guesses his answers and apologized frequently when tasks were difficult or challenging and he did not always have a response. But he appeared to respond appropriately to encouragement from the examiner.  No significant barriers to testing were observed and the following is judged to be a valid representation of Mr. Casillas's current cognitive strengths and weaknesses.    LIMITATIONS:  Due to recent rises in COVID-19 cases, this assessment was conducted using face-to-face testing with the examiner wearing [employer] designated PPE (but  the patient did not wear a face mask). The standard administration of these procedures involves in-person, face-to-face methods without PPE. The impact of applying non-standard administration methods has been evaluated only in part by scientific research. While every effort was made to simulate standard assessment practices, the diagnostic conclusions and recommendations for treatment provided in this report are being advanced with these limitations in mind.    TESTS ADMINISTERED:   Vera Naming Test (BNT), DKEFS Verbal Fluency, Clock Drawing, Generalized Anxiety Disorder-7 (TAINA-7), Geriatric Depression Scale 30 (GDS), [facility] [place] Health and Safety, Jesús Auditory Verbal Learning Test (RAVLT), Jesús-Osterrieth Complex Figure Test (RCFT), Stroop Color and Word Test, MEDRANO Tokens Form B, Wechsler Adult Intelligence Scale--Fourth Edition (WAIS-IV), WMS-IV Logical Memory, WRAT-5 Word Reading (blue), Wisconsin Card Sorting Test-2 deck (WCST) and WMS-III Information and Orientation.    MOANS norms were used for BNT, RAVLT  (Raw scores in parentheses)    An attempt was made to match the battery administered in 2017.     DESCRIPTIVE PERFORMANCE KEY:    Labels for tests with Normal Distributions  Score Label Standard Score %ile Rank   Exceptionally high score  > 130 > 98   Above average score 120-129 91-97   High average score 110-119 75-90   Average score  25-74   Low average score 80-89 9-24   Below average score 70-79 2-8   Exceptionally low score < 70 < 2     Labels for tests with Non-Normal Distributions  Score Label %ile Rank   Within normal expectations/ limits score (WNL) > 24   Low average score 9-24   Below average score 2-8   Exceptionally low score < 2     The following test results utilize score labels as adapted from Cody Menard, Jan Samaniego, Whit Good, GEO Pimentel, Paige Lundberg, Luke Romano, Zackary Chappell & Conference Participants (2020): American Academy of Clinical  Neuropsychology consensus conference statement on uniform labeling of performance test scores, The Clinical Neuropsychologist, DOI: [email or web address]    All scores contain some measure of error; scores are reported here as they are obtained by the individual (without reference to the range of error). These are meant as labels and not interpretation of performance. While other relevant comments regarding task performance are provided below, please see the Assessment, Impressions and Diagnostic Summary sections of this report for interpretation of the scores and the cognitive profile as a whole, including what does and does not constitute impairment.    OPTIMAL PREMORBID INTELLECT:  Optimal premorbid intellectual abilities were estimated as falling in the below average (FSIQ = 76) range based on Mr. Casillas's educational and occupational histories and performance on tasks least likely to be affected by acquired brain dysfunction (i.e.,  hold tests ).  Of note overall IQ is very consistent with 2017 testing (FSIQ = 74).    SUMMARY OF TEST RESULTS:     Orientation, Attention and Processing Speed  Mental status exam was measured as a within normal limits score for his age (13). He was oriented to person, city, time, and date and was able to correctly name the current and previous presidents.  He knew that he was at a [facility] just not the specific name.  In 2017, he actually earned a below average score on this task and lost 3 points but information was not provided as to what he lost points for.    Overall working memory skills were assessed as an exceptionally low score (WMI = 66). Performance on a measure of basic attention and working memory was assessed as an exceptionally low score (12). This reflected a low average score for basic attention skills (LDF = 4) and working memory scores that ranged from a low average score (LDB = 3) to an exceptionally low score (LDS = 2).  While 2017 performance  on the same task was also assessed as an exceptionally low score, performance was almost an entire standard deviation weaker.  Current performance on a mental arithmetic task was assessed as a below average score (7) and consistent with previous testing.    Overall performance on a measure of speeded processing was assessed as a below average score (PSI = 74).  This reflected an exceptionally low score on a symbol search task (6) but a low average score on a digit symbol substitution task (30).  In 2017 performance was slightly stronger (PSI = 81) reflecting a below average score on the symbol search task and an average score on the digit symbol substitution task.     Overall, as compared to testing in 2017, orientation has improved slightly, attention and working memory are stable to slightly improved and cognitive efficiency has declined slightly.    Language  Sight word reading skills (45) were assessed as a below average score and consistent with previous testing.  Overall verbal reasoning skills were assessed as a low average score (VCI = 85).  This reflects an average score for fund of general knowledge (15), a low average score for expressive vocabulary (19), and a below average score for verbal abstraction (11).  Performance on these 3 measures is largely stable as compared to 2017 testing.  His performance on a measure of semantic fluency was assessed as a low average score (24) representing a slight improvement from a previous below average score in 2017. Confrontation naming was measured as a within normal limits score (51) representing slight improvement from a previous below average to low average score (48) in 2017.  His ability to follow a series of increasingly complex commands was assessed as an average score (43) and consistent with previous testing.    Overall, language skills are stable to slightly improved as compared to testing 7 years ago.     Visuospatial Skills  Nonverbal reasoning skills  were overall as a low end of average score (LAURA = 90).  This reflects an average score for both a block construction task (24) and a pattern completion task (10), and a low average score for a visual puzzles task (6).  In 2017 the block construction task had also been assessed as an average score and the visual puzzles task as a low average score, but the pattern completion task had been slightly weaker and measured as a low average score.    Nonverbal reasoning skills have improved very slightly as compared to previous testing.    Learning and Memory  On a list learning task, overall learning for a 15 item word list was measured as an average score (2,4,2,6,8), albeit with a variable learning curve. After a brief delay, he was able to retain 6 words (an average score) for immediate recall performance. After a longer 30 minute delay, he was able to retain 7 words (an average score) for delayed free recall performance. Good benefit was obtained from recognition cues as he correctly identified 15 words and made just 1 false positive error.  In 2017, performance was very similar with overall average learning, immediate recall and delayed recall.  However recognition performance at that time was notable for also identifying all of the correct words but making 8 false positive errors.    Immediate memory for two short stories was measured as an average score (24). Delayed recall of these stories resulted in an average score (18, 90% retention). Recognition memory was measured as an average score (18/23).  In 2017, immediate recall for the same stories was assessed as a below average score but with an average score for delayed recall.  Recognition memory had been slightly weaker at that time and assessed as a low average score.    Immediate recall of a complex figure was assessed as a below average score (3.5).  Delayed recall of this figure was assessed as an exceptionally low score (3.5), although notable for 100%  retention of the limited information he did learn.  Of note, his copy of the figure was notable for poor planning, significant difficulty integrating detail, and a piecemeal approach which likely interfered with encoding.  Recognition memory was assessed as an exceptionally low score (13).  In 2017, immediate and delayed recall of the same figure was assessed as an average score and a low average score respectively.  Of note, his copy was stronger at that time although still well below expectation for his age.    Overall, as compared to 2017, rote verbal learning and verbal memory are stable, and prose learning has improved substantially.  Nonverbal learning and memory have declined but this was likely impacted by his original difficulties copying a complex figure which likely interfered with encoding.    Executive Functioning  Working memory skills ranged from a low average score (LDB = 3) to an exceptionally low score (LDS = 2) on a digit repetition task and were assessed as a below average score on a mental arithmetic task.  In 2017, working memory scores also ranged from an exceptionally low score to a below average score. His performance on a measure of phonemic fluency resulted in an exceptionally low score (6) representing a decline from a previous below average score in 2017. On a measure of deductive reasoning and problem-solving, overall performance was assessed as a within normal limits score for his age and education in terms of the number of categories learned (2). His performance was characterized by a highly error prone response style (exceptionally low score, NPE= 47, AL= 34). In 2017, he also learned categories but was disrupted by both an error prone and perseverative response style. His copy of a complex figure was performed as an exceptionally low score for his age (13.5) and notable for poor planning, significant difficulty integrating detail, and a piecemeal approach.  In 2017 he also earned an  exceptionally low score on this task, but with much stronger raw score performance (23.5). His drawing of a clock was unremarkable.     Performance on measures of executive functioning were stable to slightly reduced as compared to 2017 testing.    Adaptive and Emotional Functioning  On the ILS health and safety questions, he earned a below average score (30) and his responses were most notable for being somewhat vague at times and not being able to elaborate more fully, but not necessarily an overtly incorrect or inappropriate response.  He earned the exact same score in 2017.    On the Geriatric Depression Scale-30 (GDS), a self report measure of depressive symptomatology, he obtained a score of 3, placing him in the range of no significant symptoms of depression.     On the Generalized Anxiety Disorder-7, a self-report measure of anxiety, he obtained a score of 0, placing him in the range of minimal anxiety.     EVALUATION SERVICES AND TIME:   A clinical interview/neurobehavioral status examination was conducted with the patient and documented. I thoroughly reviewed the medical record, selected the neuropsychological test battery, provided supervision to the individual who administered and scored the neuropsychological test battery, interpreted/integrated patient data and test results, engaged in clinical decision making, treatment planning, report writing/preparation, and provided and documented interactive feedback of test results on the day of testing . A trained examiner/technician directly administered and scored 2+ neuropsychological tests. Please see below for a breakdown of time spent and the associated codes billed for these services.     Services   Time Spent  CPT Codes   Neurobehavioral Status Exam:  (e.g., face-to-face, interpretation, report) 60 minutes 1 x 96116   Neuropsychological Evaluation Services:   (e.g., integration, interpretation, treatment planning, clinical decision making, feedback)    185 minutes   1 x 96132  2 x 96133        Neuropsychological Testing by Trained Examiner/Technician:  (e.g., test administration, scoring, 2+ tests administered)   275 minutes   1 x 96138  8 x 96139     Diagnosis:  Major Vascular Neurocognitive Disorder   Borderline intellectual functioning    For diagnostic and coding purposes, Mr. 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 Feedback of results was provided on the day of testing.       Coco Pacheco, PhD, LP, ABPP  Clinical Neuropsychologist, [id number]  Board Certified in Clinical Neuropsychology    [facility]  [address] [place] [address], [address]  [place] [address]  Phone:  [phone]

## 2024-02-22 NOTE — PROGRESS NOTES
The patient was seen for a neuropsychological evaluation for the purposes of diagnostic clarification and treatment planning. 185 minutes of face-to-face testing were provided by this writer. An additional 90 minutes were spent scoring and compiling test results. The patient was cooperative with testing. No concerns were brought to my attention. Please see Dr. Pacheco's report for a detailed description of the charges and interpretation and integration of the findings.

## 2024-02-29 NOTE — PATIENT INSTRUCTIONS
DIAGNOSTIC IMPRESSIONS (from 2/21/2024 Neuropsychology Consult):    Results  Moderate impairments in nonverbal learning and memory  Otherwise basic attention, speed of thinking, language, spatial skills, verbal learning, verbal memory, and problem solving skills are all consistent with his intellectual abilities  As compared to testing 7 years ago, there are declines on a measure of nonverbal learning and memory but otherwise scores are remarkably stable to improved (including on measures of verbal learning and memory)  There is no evidence of a neurodegenerative process such as Alzheimer's  Cognitive lapses in daily life best explained by normal aging on a brain that has already been compromised (history of early neurologic insult/ stroke)    Diagnosis  Major Vascular Neurocognitive Disorder   Borderline intellectual functioning    RECOMMENDATIONS:  Driving and Activities of Daily Living  Mr. Casillas would benefit from continued help in his daily activities particularly in terms of managing medications (i.e., having a pillbox set up for him and having someone make sure that he is taking his medications regularly and as prescribed) and with managing his finances.   It is strongly recommended that a family member or close friend accompany Mr. Casillas to all appointments to ensure that accurate information is given to providers and instructions are followed. It will be helpful to present the information in brief, repeated segments and have him repeat back the information in his own words to ensure understanding.   If not already done, completion of paperwork for advance directives and assignment of healthcare and financial power of  should be considered at this time.  Mr. Casillas would benefit from increased supervision and support in his daily life so as to help him manage daily tasks such as cooking and cleaning as well as keeping track of medications and to ensure his personal safety. An assisted  living facility would be ideal so as to allow him some independence while also providing a structured and supportive environment.    Physical and Emotional Health  Mr. Casillas is encouraged to move forward with the trial of Keppra as recommended by his neurologist  It is important that Mr. Casillas continue to adhere to his medication treatment regimen and follow a healthy diet so as to maintain his physical health, as this can have a significant impact on his physical, emotional, and cognitive functioning.   Under the guidance of his physician, It is recommended that regular exercise be integrated into Mr. Casillas's routine as it will likely benefit him cognitively and emotionally as well as physically.   Mr. Casillas does not appear to be struggling with any significant emotional symptoms. Behavioral activation techniques such as regular exercise (under the guidance of his physician), recreational activities and regular social interaction would likely be effective in helping Mr. Casillas to continue to maintain his mood.   Memory and Organization  Mr. Casillas is encouraged to continue to engage in stimulating activities, (i.e., card games, puzzles, projects) to keep him cognitively active.   In his daily life, Mr. Casillas will continue to benefit from the use of compensation techniques. That is, he may find it helpful to post reminder notes around the house, make lists, and carry a small calendar so that he can feel more comfortable and confident in his ability to remember information. A daily planner could also be used as a memory book where important information is recorded and organized for future reference.   Mr. Casillas could also create a system to establish set locations for certain items (i.e., keys) such that he always knows where to put them upon entering the house and where to look when he needs them. If he would like to keep certain items out of sight (i.e., a wallet), he could set up a  specific hidden place to keep items and use that same place so as to ensure he can find the required item when needed.   Follow-up  Re-evaluation in 1-2 years is recommended. The current test data can be used as an updated baseline to which future comparisons can be made.  (An order will need to be entered by his neurologist or primary care provider.)

## 2024-03-03 ENCOUNTER — HEALTH MAINTENANCE LETTER (OUTPATIENT)
Age: 74
End: 2024-03-03

## 2024-03-04 ENCOUNTER — OFFICE VISIT (OUTPATIENT)
Dept: INTERNAL MEDICINE | Facility: CLINIC | Age: 74
End: 2024-03-04
Payer: MEDICARE

## 2024-03-04 VITALS
OXYGEN SATURATION: 96 % | SYSTOLIC BLOOD PRESSURE: 138 MMHG | TEMPERATURE: 97.5 F | HEART RATE: 79 BPM | WEIGHT: 221.7 LBS | RESPIRATION RATE: 14 BRPM | DIASTOLIC BLOOD PRESSURE: 78 MMHG | HEIGHT: 66 IN | BODY MASS INDEX: 35.63 KG/M2

## 2024-03-04 DIAGNOSIS — E11.42 DIABETIC POLYNEUROPATHY ASSOCIATED WITH TYPE 2 DIABETES MELLITUS (H): Primary | ICD-10-CM

## 2024-03-04 DIAGNOSIS — R41.89 COGNITIVE IMPAIRMENT: ICD-10-CM

## 2024-03-04 DIAGNOSIS — E78.2 MIXED HYPERLIPIDEMIA: ICD-10-CM

## 2024-03-04 DIAGNOSIS — E66.812 CLASS 2 SEVERE OBESITY DUE TO EXCESS CALORIES WITH SERIOUS COMORBIDITY AND BODY MASS INDEX (BMI) OF 35.0 TO 35.9 IN ADULT (H): ICD-10-CM

## 2024-03-04 DIAGNOSIS — E11.3293 TYPE 2 DIABETES MELLITUS WITH BOTH EYES AFFECTED BY MILD NONPROLIFERATIVE RETINOPATHY WITHOUT MACULAR EDEMA, WITHOUT LONG-TERM CURRENT USE OF INSULIN (H): ICD-10-CM

## 2024-03-04 DIAGNOSIS — N18.31 STAGE 3A CHRONIC KIDNEY DISEASE (H): ICD-10-CM

## 2024-03-04 DIAGNOSIS — E66.01 CLASS 2 SEVERE OBESITY DUE TO EXCESS CALORIES WITH SERIOUS COMORBIDITY AND BODY MASS INDEX (BMI) OF 35.0 TO 35.9 IN ADULT (H): ICD-10-CM

## 2024-03-04 DIAGNOSIS — R94.01 NONSPECIFIC ABNORMAL ELECTROENCEPHALOGRAM (EEG): ICD-10-CM

## 2024-03-04 DIAGNOSIS — I10 ESSENTIAL HYPERTENSION: ICD-10-CM

## 2024-03-04 LAB
ANION GAP SERPL CALCULATED.3IONS-SCNC: 12 MMOL/L (ref 7–15)
BUN SERPL-MCNC: 19.9 MG/DL (ref 8–23)
CALCIUM SERPL-MCNC: 9.6 MG/DL (ref 8.8–10.2)
CHLORIDE SERPL-SCNC: 103 MMOL/L (ref 98–107)
CREAT SERPL-MCNC: 0.82 MG/DL (ref 0.67–1.17)
DEPRECATED HCO3 PLAS-SCNC: 25 MMOL/L (ref 22–29)
EGFRCR SERPLBLD CKD-EPI 2021: >90 ML/MIN/1.73M2
GLUCOSE SERPL-MCNC: 107 MG/DL (ref 70–99)
HBA1C MFR BLD: 6.3 % (ref 0–5.6)
POTASSIUM SERPL-SCNC: 4.3 MMOL/L (ref 3.4–5.3)
SODIUM SERPL-SCNC: 140 MMOL/L (ref 135–145)

## 2024-03-04 PROCEDURE — 99214 OFFICE O/P EST MOD 30 MIN: CPT | Performed by: INTERNAL MEDICINE

## 2024-03-04 PROCEDURE — 83036 HEMOGLOBIN GLYCOSYLATED A1C: CPT | Performed by: INTERNAL MEDICINE

## 2024-03-04 PROCEDURE — 36415 COLL VENOUS BLD VENIPUNCTURE: CPT | Performed by: INTERNAL MEDICINE

## 2024-03-04 PROCEDURE — 80048 BASIC METABOLIC PNL TOTAL CA: CPT | Performed by: INTERNAL MEDICINE

## 2024-03-04 RX ORDER — RESPIRATORY SYNCYTIAL VIRUS VACCINE 120MCG/0.5
0.5 KIT INTRAMUSCULAR ONCE
Qty: 1 EACH | Refills: 0 | Status: CANCELLED | OUTPATIENT
Start: 2024-03-04 | End: 2024-03-04

## 2024-03-04 NOTE — PROGRESS NOTES
EG  Answers submitted by the patient for this visit:  General Questionnaire (Submitted on 3/4/2024)  Chief Complaint: Chronic problems general questions HPI Form  What is the reason for your visit today? : general physical  How many servings of fruits and vegetables do you eat daily?: 0-1  On average, how many sweetened beverages do you drink each day (Examples: soda, juice, sweet tea, etc.  Do NOT count diet or artificially sweetened beverages)?: 0  How many minutes a day do you exercise enough to make your heart beat faster?: 9 or less  How many days a week do you exercise enough to make your heart beat faster?: 3 or less  How many days per week do you miss taking your medication?: 0

## 2024-03-04 NOTE — PROGRESS NOTES
"  Office Visit - Follow Up   Chemo Casillas   74 year old male    Date of Visit: 3/4/2024    Chief Complaint   Patient presents with    Results     Neuro test results        Assessment and Plan   1. Diabetic polyneuropathy associated with type 2 diabetes mellitus (H)  Stable  - HEMOGLOBIN A1C; Future  - HEMOGLOBIN A1C    2. Type 2 diabetes mellitus with both eyes affected by mild nonproliferative retinopathy without macular edema, without long-term current use of insulin (H)  Stable continue same  - Basic metabolic panel  (Ca, Cl, CO2, Creat, Gluc, K, Na, BUN); Future  - Basic metabolic panel  (Ca, Cl, CO2, Creat, Gluc, K, Na, BUN)    3. Essential hypertension  Blood pressure okay continues to    4. Mixed hyperlipidemia  Continue statin    5. Stage 3a chronic kidney disease (H)  Continue lisinopril for renal protection, check labs    6. Class 2 severe obesity due to excess calories with serious comorbidity and body mass index (BMI) of 35.0 to 35.9 in adult (H)  Continue to focus on reduction in calories, regular exercise and modest weight loss    7. Nonspecific abnormal electroencephalogram (EEG)  8. Cognitive impairment  Reviewed neuropsych assessment, reviewed neurology recommendations, discussed Keppra, has not started and seems to be doing quite well.  Will be following up with neurology but I think it is reasonable to stay off of this medication for now    Return in about 3 months (around 6/4/2024) for Routine preventive.     History of Present Illness   This 74 year old man comes in for follow-up.  We reviewed his neuro cognitive assessment, overall stable.  He has not started Keppra but with some organization and assistance from family he is doing much better with.       Physical Exam   General Appearance:   No acute distress    /78   Pulse 79   Temp 97.5  F (36.4  C) (Tympanic)   Resp 14   Ht 1.676 m (5' 6\")   Wt 100.6 kg (221 lb 11.2 oz)   SpO2 96%   BMI 35.78 kg/m           Additional " Information   Current Outpatient Medications   Medication Sig Dispense Refill    amLODIPine (NORVASC) 10 MG tablet Take 1 tablet (10 mg) by mouth daily 90 tablet 4    aspirin 81 MG EC tablet Take 1 tablet (81 mg) by mouth daily 90 tablet 4    atorvastatin (LIPITOR) 20 MG tablet Take 1 tablet (20 mg) by mouth daily 90 tablet 4    finasteride (PROSCAR) 5 MG tablet Take 1 tablet (5 mg) by mouth daily 90 tablet 4    lisinopril (ZESTRIL) 20 MG tablet Take 1 tablet (20 mg) by mouth daily 90 tablet 4    metFORMIN (GLUCOPHAGE) 1000 MG tablet Take 1 tablet (1,000 mg) by mouth 2 times daily (with meals) 180 tablet 4    tamsulosin (FLOMAX) 0.4 MG capsule Take 1 capsule (0.4 mg) by mouth daily 90 capsule 4       Time:      Cody Pacheco MD  Answers submitted by the patient for this visit:  General Questionnaire (Submitted on 3/4/2024)  Chief Complaint: Chronic problems general questions HPI Form  What is the reason for your visit today? : general physical  How many servings of fruits and vegetables do you eat daily?: 0-1  On average, how many sweetened beverages do you drink each day (Examples: soda, juice, sweet tea, etc.  Do NOT count diet or artificially sweetened beverages)?: 0  How many minutes a day do you exercise enough to make your heart beat faster?: 9 or less  How many days a week do you exercise enough to make your heart beat faster?: 3 or less  How many days per week do you miss taking your medication?: 0

## 2024-03-12 ENCOUNTER — PATIENT OUTREACH (OUTPATIENT)
Dept: CARE COORDINATION | Facility: CLINIC | Age: 74
End: 2024-03-12
Payer: MEDICARE

## 2024-03-12 NOTE — PROGRESS NOTES
Clinic Care Coordination Contact  [facility]/Voicemail    Clinical Data: Care Coordinator Outreach         No data to display                Left message on  Chemo's HUMBERTO Suzy's  voicemail with call back information and requested return call.    Plan: Care Coordinator CHW to to update and discuss current CC goal progression  Care Coordinator will try to reach patient again in 2 weeks= 3/27/2024.    Neurology appt scheduled= 4/17/2024    Glenis ROSENBAUM  Community Health Worker  [facility] Care Coordination  [place] [email or web address]  Office: [phone]

## 2024-03-26 ENCOUNTER — PATIENT OUTREACH (OUTPATIENT)
Dept: CARE COORDINATION | Facility: CLINIC | Age: 74
End: 2024-03-26
Payer: MEDICARE

## 2024-03-26 ASSESSMENT — ACTIVITIES OF DAILY LIVING (ADL): DEPENDENT_IADLS:: TRANSPORTATION;MEDICATION MANAGEMENT

## 2024-03-26 NOTE — PROGRESS NOTES
Care Coordination Clinician Chart Review    Situation: Patient chart reviewed by Care Coordinator.       Background: Care Coordination Program started: 7/21/2023. Initial assessment completed and patient-centered care plan(s) were developed with participation from patient. Lead CC handed patient off to CHW for continued outreaches.       Assessment: Per chart review, patient outreach completed by CC CHW on 3-.  Patient is actively working to accomplish goal(s). Patient's goal(s) appropriate and relevant at this time. Patient is due for updated Plan of Care.  Assessments will be completed annually or as needed/with change of patient status.      Care Plan: Help At Home       Problem: Insufficient In-home support       Long-Range Goal: i will live safely in my home with support as needed.       Start Date: 7/27/2023 Expected End Date: 7/26/2024    This Visit's Progress: 30% Recent Progress: 20%    Priority: High    Note:     Barriers: may not recognize what is needed.   Strengths: family support.   Patient expressed understanding of goal:  in  does  Action steps to achieve this goal:  1.  in  will talk to patient about supports to help at home.   2.  in  will arrange for supports if he is willing.  3.  in  will report progress towards this goal at scheduled outreach telephone calls from the CCC team.    [place] 2/5 with [facility], family continues to help Chemo with house work etc.  Discussed 11/21 with [facility], family has come together to help with bills, de cluttering, organizing his house  Discussed 8/24 with [facility]                            Care Plan: Medication Regimen       Problem: Medication Adherence       Long-Range Goal: Consistently take Medications as Prescribed       Start Date: 7/27/2023 Expected End Date: 7/26/2024    This Visit's Progress: 40% Recent Progress: 30%    Priority: High    Note:     Barriers: has not been taking meds for over a year.   Strengths: family can  set up meds in pill boxes and monitor.  Patient expressed understanding of goal: sister in law does.  Action steps to achieve this goal:  1. Sister in law will work with pharmacy to get medications sent to her home.   2. Sister in law will set up pill boxes with patient.  3. Sister hollis cook will monitor and work with [place] pharmacist as needed.  4. Sister hollis cook will report progress towards this goal at scheduled outreach telephone calls from the [facility] team.    2/5/2024- discussed, Suzy continues to set up patient medications, patient remains compliant  11/21- discussed with Suzy- She is setting up medication box every Sunday 8/24 discussed, HUMBERTO Almonte is setting up med box weekly, will be looking into new med box for 2 week duration.                            Care Plan: Specialty Referral       Problem: Patient is in need of specialty care       Goal: I will have neuro-psych appt to assess my cognition.       Start Date: 7/27/2023 Expected End Date: 1/1/2024    This Visit's Progress: 70% Recent Progress: 60%    Priority: Medium    Note:     Barriers:none noted.   Strengths: have had one done before and can hopefully go back to same clinic.  Family support.   Patient expressed understanding of goal: sister in law does.  Action steps to achieve this goal:  1. Sister in law will schedule appt.and will bring him to appt.   2. Sister hollis cook will report progress towards this goal at scheduled outreach telephone calls from the [facility] team.    2/5/2024- follow up neuro appt on 2/21/2024 and 4/17/2024 11/21 completed eval, follow up appt scheduled 2/21/2024 8/24 discussed with HUMBERTO Almonte, was able to get in quick due to cancellation, first appt was on 8/17, needs MRI and EKG follow up appt is scheduled for 9/1/2023                              Care Plan: Community Resources       Problem: Lack of transportation               Problem: Insufficient In-home support       Long-Range Goal: [place] if [place] can  get in home supports.       Start Date: 7/27/2023 Expected End Date: 7/26/2024    Recent Progress: 10%    Priority: Medium    Note:     Barriers: none noted  Strengths: family can help.   Patient expressed understanding of goal:  in law set up.  Action steps to achieve this goal:  1. Sister in law will [place] and be with patient at assessment.  2. Sister in law will help complete all paper work.  3. Sister in law will report progress towards this goal at scheduled outreach telephone calls from the CCC team.    Dicussed- no update at this time  Discussed 11/21 no update  [place] 8/24 with [facility] scheduled follow up SW call to discuss alternative elderly waiver                                  Plan/Recommendations: The patient will continue working with Care Coordination to achieve goal(s) as above. CHW will continue outreaches at minimum every 30 days and will involve Lead CC as needed or if patient is ready to move to Maintenance. Lead CC will continue to monitor CHW outreaches and patient's progress to goal(s) every 6 weeks.     Plan of Care updated and sent to patient: Yes, via [employer]

## 2024-03-26 NOTE — LETTER
[facility] CARE COORDINATION  [facility]    March 26, 2024        Chemo Casillas  [address]  [place] [address]          Dear Chemo,     Benito is an updated Complex Care Plan for your continued enrollment in Care Coordination. Please let us know if you have additional questions, concerns, or goals that we can assist with.    Sincerely,    Sparkle Salazar,   [facility]  [phone]        [facility]  Patient Centered Plan of Care  About Me:        Patient Name:  Chemo Casillas    YOB: 1950  Age:         74 year old   [place] MRN:    [id number] Telephone Information:  Home Phone [phone]   Mobile [phone]       Address:  [address]  [place] [address] Email address:  [email or web address]      Emergency Contact(s)    Name Relationship Lgl Grd Work Phone Home Phone Mobile Phone   1. SHRAVAN CLINE Sister-in-Law   [phone]           Primary language:  English     needed? No   [place] [employer]:  [phone] op. 1  Other communication barriers:Access to technology    Preferred Method of Communication:     Current living arrangement: I live in a private home    Mobility Status/ Medical Equipment: Independent        Health Maintenance  Health Maintenance Reviewed: Due/Overdue   Health Maintenance Due   Topic Date Due    ZOSTER IMMUNIZATION (1 of 2) Never done    RSV VACCINE (Pregnancy & 60+) (1 - 1-dose 60+ series) Never done    DTAP/TDAP/TD IMMUNIZATION (2 - Td or Tdap) 01/16/2018           My Access Plan  Medical Emergency 911   Primary Clinic Line [facility] - [phone]   24 Hour Appointment Line [phone] or  [phone]) (toll-free)   24 Hour Nurse Line [phone] (toll-free)   Preferred Urgent Care [facility] [phone]     Preferred Hospital [facility]  [phone]     Preferred Pharmacy [id number] - [place], [place] - [address] AT [facility] [address]     Behavioral Health Crisis Line The National Suicide Prevention Lifeline at 1-575.485.8362 or Text/Call 988           My Care Team Members  Patient Care Team         Relationship Specialty Notifications Start End    Cody Pacheco MD PCP - General   2/10/16     Phone: [phone] Fax: [phone]         [address] [facility] [address]    Cody Pacheco MD Assigned PCP   4/3/22     Phone: [phone] Fax: [phone] [address] [facility] [address]    Saprkle Salazar LSW Lead Care Coordinator Primary Care - CC Admissions 7/25/23     Phone: [phone]         Farrah Perea CHW Community Health Worker  Admissions 7/27/23     Cody Mahmood MD MD Neurology  8/8/23     Phone: [phone] Fax: [phone] [address] [facility] [address]    Cody Mahmood MD Assigned Neuroscience Provider   8/12/23     Phone: [phone] Fax: [phone] [address] [facility] [address]    Coco Pacheco, PhD  Assigned Behavioral Health Provider   3/15/24     Phone: [phone] Fax: [phone]         [facility] [facility] [email or web address]] [address] [facility] [address]                My Care Plans  Self Management and Treatment Plan    Care Plan  Care Plan: Help At Home       Problem: Insufficient In-home support       Long-Range Goal: i will live safely in my home with support as needed.       Start Date: 7/27/2023 Expected End Date: 7/26/2024    This Visit's Progress: 30% Recent Progress: 20%    Priority: High    Note:     Barriers: may not recognize what is needed.   Strengths: family support.   Patient expressed understanding of goal: sister in law does  Action steps to achieve this goal:  1. Sister in law will talk to patient about supports to help at home.   2. Sister in law will arrange for supports if he is willing.  3. Sister in law will report progress towards this  goal at scheduled outreach telephone calls from the CCC team.    Discussed 2/5 with Suzy, family continues to help Chemo with house work etc.  Discussed 11/21 with Suzy, family has come together to help with bills, de cluttering, organizing his house  Discussed 8/24 with suzy                            Care Plan: Medication Regimen       Problem: Medication Adherence       Long-Range Goal: Consistently take Medications as Prescribed       Start Date: 7/27/2023 Expected End Date: 7/26/2024    This Visit's Progress: 40% Recent Progress: 30%    Priority: High    Note:     Barriers: has not been taking meds for over a year.   Strengths: family can set up meds in pill boxes and monitor.  Patient expressed understanding of goal: sister in law does.  Action steps to achieve this goal:  1. Sister in law will work with pharmacy to get medications sent to her home.   2. Sister in law will set up pill boxes with patient.  3. Sister hollis cook will monitor and work with [place] pharmacist as needed.  4. Sister hollis cook will report progress towards this goal at scheduled outreach telephone calls from the [facility] team.    2/5/2024- discussed, Suzy continues to set up patient medications, patient remains compliant  11/21- discussed with Suzy- She is setting up medication box every Sunday 8/24 discussed, HUMBERTO Suzy is setting up med box weekly, will be looking into new med box for 2 week duration.                            Care Plan: Specialty Referral       Problem: Patient is in need of specialty care       Goal: I will have neuro-psych appt to assess my cognition.       Start Date: 7/27/2023 Expected End Date: 1/1/2024    This Visit's Progress: 70% Recent Progress: 60%    Priority: Medium    Note:     Barriers:none noted.   Strengths: have had one done before and can hopefully go back to same clinic.  Family support.   Patient expressed understanding of goal: sister in law does.  Action steps to achieve this goal:  1. Sister in law will schedule  appt.and will bring him to appt.   2.  in law will report progress towards this goal at scheduled outreach telephone calls from the [facility] team.    2/5/2024- follow up neuro appt on 2/21/2024 and 4/17/2024 11/21 completed eval, follow up appt scheduled 2/21/2024 8/24 discussed with HUMBERTO Almonte, was able to get in quick due to cancellation, first appt was on 8/17, needs MRI and EKG follow up appt is scheduled for 9/1/2023                              Care Plan: Community Resources       Problem: Lack of transportation               Problem: Insufficient In-home support       Long-Range Goal: [place] if [place] can get in home supports.       Start Date: 7/27/2023 Expected End Date: 7/26/2024    Recent Progress: 10%    Priority: Medium    Note:     Barriers: none noted  Strengths: family can help.   Patient expressed understanding of goal:  in  set up.  Action steps to achieve this goal:  1.  in  [place] and be with patient at assessment.  2.  in law will help complete all paper work.  3.  in  will report progress towards this goal at scheduled outreach telephone calls from the CCC team.    Dicussed- no update at this time  Discussed 11/21 no update  Discussed 8/24 with Suzy, scheduled follow up SW call to discuss alternative elderly waiver                                    Advance Care Plans/Directives:   Advanced Care Plan/Directives on file:   No    Discussed with patient/caregiver(s): No data recorded           My Medical and Care Information  Problem List   Patient Active Problem List   Diagnosis    Essential hypertension    Type 2 diabetes mellitus with both eyes affected by mild nonproliferative retinopathy without macular edema, without long-term current use of insulin (H)    Mixed hyperlipidemia    Stage 3a chronic kidney disease (H)    Diabetic polyneuropathy associated with type 2 diabetes mellitus (H)    Class 2 severe  obesity due to excess calories with serious comorbidity in adult (H)      Current Medications and Allergies:    Current Outpatient Medications   Medication Instructions    amLODIPine (NORVASC) 10 mg, Oral, DAILY    aspirin 81 mg, Oral, DAILY    atorvastatin (LIPITOR) 20 mg, Oral, DAILY    finasteride (PROSCAR) 5 mg, Oral, DAILY    lisinopril (ZESTRIL) 20 mg, Oral, DAILY    metFORMIN (GLUCOPHAGE) 1,000 mg, Oral, 2 TIMES DAILY WITH MEALS    tamsulosin (FLOMAX) 0.4 mg, Oral, DAILY         Care Coordination Start Date: 7/21/2023   Frequency of Care Coordination: monthly, more frequently as needed     Form Last Updated: 03/26/2024

## 2024-03-27 ENCOUNTER — PATIENT OUTREACH (OUTPATIENT)
Dept: CARE COORDINATION | Facility: CLINIC | Age: 74
End: 2024-03-27
Payer: MEDICARE

## 2024-03-27 NOTE — PROGRESS NOTES
Clinic Care Coordination Contact  [facility]/Voicemail    CHW returning HUMBERTO Almonte's voicemail  Clinical Data: Care Coordinator Outreach    Outreach Documentation Number of Outreach Attempt   3/27/2024  10:28 AM 1       Left message on  patient's HUMBERTO Suzy  voicemail with call back information and requested return call.    Plan: Care Coordinator CHW to update and discuss current CC goal progression   Care Coordinator will try to reach patient again in 10 business days= 4/5/2024    CC Goals:  In home support  Take daily medications  Neuro eval and follow up- next appt 4/17/2024  [place]?    Glenis ROSENBAUM  Community Health Worker  [facility] Care Coordination  Kat [email or web address] [email or web address]  [email or web address]  Office: [phone]

## 2024-04-05 ENCOUNTER — PATIENT OUTREACH (OUTPATIENT)
Dept: CARE COORDINATION | Facility: CLINIC | Age: 74
End: 2024-04-05
Payer: MEDICARE

## 2024-04-05 NOTE — PROGRESS NOTES
Clinic Care Coordination Contact  [facility]/Voicemail    Clinical Data: Care Coordinator Outreach    Outreach Documentation Number of Outreach Attempt   3/27/2024  10:28 AM 1   4/5/2024  10:48 AM 2       Left message on  patient's HUMBERTO Suzy's  voicemail with call back information and requested return call.    Plan: Care Coordinator will send unable to contact letter with care coordinator contact information via [employer]. Care Coordinator will try to reach patient again in 3 weeks= 4/26/2024.    CC Goals:  Live safely in my home  Med Mgmt- HUMBERTO sets up med box  Neuro psych follow up - Next appt 4/17/2024  [place]      Next PCP appointment is scheduled= 5/31/2024 @ 11:00 am    Glenis ROSENBAUM  Community Health Worker  [facility] Care Coordination  [email or web address]  [email or web address]  Office: [phone]\s

## 2024-04-16 NOTE — PROGRESS NOTES
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  Family is now more involved with patient and feel that he has had some \"improvement\"    Patient feels he is stable  Does do crossword puzzles  Watches jeopardy and wheel of [email or web address]    Family with monitoring meds  They are making sure that he is eating properly  He has stabilized    We did discuss that if he had rapid fluctuations in functioning we could reconsider Keppra  We talked about Aricept and its side effects of lightheadedness/heartburn/diarrhea we will hold off on those medicines at this time  We talked about the fact that his MoCA testing could fluctuate over time  Follow-up in a year sooner if there is problems    Review of workup for cognitive difficulties:  EEG 9/1/2023  Impression moderately abnormal awake EEG due to:  Rare paroxysmal burst of generalized delta slowing lasting 1-2 seconds without intermixed spikes  Clinical correlation  This could be indicative of lowered seizure threshold    MRI scan brain 8/29/2023 compared to 10/24/2027 similar  Patient with bilateral watershed ischemic changes.      Formal neuropsychometric test 2/21/2024 reviewed  Moderate impairment and nonverbal learning and memory  Otherwise basic attention/speed of thinking/spatial skills/verbal learning/verbal memory stable.  As compared to testing 7 years ago there are declines and " "measures of nonverbal learning otherwise scores are remarkably stable to improved.  No evidence neurodegenerative process  Cognitive lapses in daily life best explained by normal aging on a brain that has already been compromised.    Discussed with patient and family member at length about possibility of lowered seizure threshold  Talked about the option of adding Keppra due to the paroxysmal nature of the EEG to see if this would help stabilize things  Discussed at length that he may not have true epilepsy that this would be a trial of medication low-dose Keppra 250 mg twice daily.  Family members help with all of his medicines and the medication fontanez so that he can remember them    Previously had discussed empiric Keppra treatment  Had recommended Keppra 250 mg twice daily  Patient chose not to go on medication            A.  Memory loss/infantile water shed strokes/premorbid low IQ        Multifactorial cognitive difficulty         Presentation history:       History of neuropsych testing at [facility] in 2017       Family members feel that patient has progressed             Patient is not caring for his hygiene       Patient's house is not Clean watches TV all day does not leave the home       Family feels he should be in assisted living         Patient had difficulties with household tasks and caring for himself back in 2017 evaluated by Dr. 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  Per family previous testing in the past showed \"stroke at birth\"       Concerned that he would have dementia as he aged    C.  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  Vascular risk factors        Hypertension/hyperlipidemia/diabetes/CKD/cardiomyopathy    Past medical history  History  watershed stroke  Cognitive difficulties  Hypertension  Hyperlipidemia  Diabetes  Cardiomyopathy  CKD  Cataracts    Habits  Non-smoker  Alcohol less than 1/month rare    Family history   Mother diabetes  Father melanoma  Brother endocarditis        Workup  Neuropsych testing 2017  Previous MRI of the head 2017 per verbal report may be showed watershed strokes chronic  Previous neuropsychometric testing   1.  Low average intellectual function  Scale levels third-fifth grade level    MRI brain 2023 compared to 10/24/2017  1.  No acute/subacute infarcts, mass lesions, hydrocephalus or MRI evidence of intracranial hemorrhage.  2.  Mild diffuse cerebral parenchymal volume loss.         Presumed chronic hypertensive/microvascular ischemic white matter changes.  3.  Redemonstration of symmetric areas of encephalomalacia involving        the bilateral frontoparietal and occipital regions in a watershed type distribution.  EEG 2023  Impression moderately abnormal awake EEG due to:  Rare paroxysmal burst of generalized delta slowing lasting 1-2 seconds without intermixed spikes  Clinical correlation  This could be indicative of lowered seizure threshold    Formal neuropsychometric test 2024 reviewed  Moderate impairment and nonverbal learning and memory  Otherwise basic attention/speed of thinking/spatial skills/verbal learning/verbal memory stable.  As compared to testing 7 years " ago there are declines and measures of nonverbal learning otherwise scores are remarkably stable to improved.  No evidence neurodegenerative process  Cognitive lapses in daily life best explained by normal aging on a brain that has already been compromised.      Laboratory data                       7/2023            3/2024  NA/K            140/3.9           140/4.3  BUN/Cr        13.9/0.88        19.9/0.82  GLU             147                 107  AST              32  WBC/HGB    7.6/15.9  PLTs             191,000    HGBA1C      7.1                   6.3  HDL/LDL      42/122  TSH              2.9  B12               575      MoCA  8/11/2023,      22 out of 30  4/17/2024,     19 out of 30      Exam    Review of system    Difficulty with memory    No headache no chest pain no shortness of breath no nausea vomiting no diarrhea no fever chills    No diplopia dysarthria dysphagia  No focal visual changes  No focal weakness  No focal numbness    Complains of slow gait  Some balance problems  Slightly slurred speech    No bladder difficulties but does have impotence    Otherwise review systems negative    General exam  Blood pressure blood pressure 181/95, pulse 109  Alert and attentive  HEENT adequate hearing  Lungs clear  Heart rate regular  Abdomen soft  Symmetrical pulses    Neurologic exam  Normal prosody of speech  Normal naming  Normal comprehension  Normal repetition  No aphasia  No neglect    MoCA  8/11/2023         22 out of 30  4/17/2024,       19 out of 30      Cranial nerves II through XII  No ophthalmoplegia  No nystagmus  No visual field cut  Speech clear  Face symmetrical  Tongue twisters okay    Upper extremities  No drift  No tremor    Lower extremities  Distal proximal strength okay    Gait  Gets up from the chair okay   Walks up and down the baldwin turns reasonably no significant gait apraxia          Assessment/plan    Cognitive difficulties coming on worse over the last 2 years        Previous difficulty  with memory and cognition going on at least prior to 2017    2.   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  Previous neuropsychometric testing in 2017 showed functioning at level third-fifth grade level       Repeat neuropsych testing 2/21/2024       Moderate impairment and nonverbal learning and memory       Otherwise basic attention/speed of thinking/spatial skills/verbal learning/verbal memory stable.        As compared to testing 7 years ago there are declines and measures of nonverbal learning otherwise scores are remarkably stable to improved.       No evidence neurodegenerative process       Cognitive lapses in daily life best explained by normal aging on a brain that has already been compromised.       4.  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  Decided not to go on the Keppra  Family members help with all of his medicines and the fontanez so that he can remember them        Family with monitoring meds  They are making sure that he is eating properly  He has stabilized    We did discuss that if he had rapid  fluctuations in functioning we could reconsider Roberto [facility]  We talked about Aricept and its side effects of lightheadedness/heartburn/diarrhea we will hold off on those medicines at this time  We talked about the fact that his MoCA testing could fluctuate over time  Follow-up in a year sooner if there is problems    Discussed with patient and patient's sister-in-law    Follow-up in 1 year    Total care time today 32 minutes    As part of visit today  Reviewed laboratory data  Reviewed primary MD note 3/4/2024  Reviewed neuropsychometric testing      Addendum 3/8/2024  Formal neuropsychometric test 2/21/2024 reviewed  Moderate impairment and nonverbal learning and memory  Otherwise basic attention/speed of thinking/spatial skills/verbal learning/verbal memory stable.  As compared to testing 7 years ago there are declines and measures of nonverbal learning otherwise scores are remarkably stable to improved.  No evidence neurodegenerative process  Cognitive lapses in daily life best explained by anormal EEG on a brain that has already been compromised.

## 2024-04-17 ENCOUNTER — OFFICE VISIT (OUTPATIENT)
Dept: NEUROLOGY | Facility: CLINIC | Age: 74
End: 2024-04-17
Attending: PSYCHIATRY & NEUROLOGY
Payer: MEDICARE

## 2024-04-17 VITALS
HEART RATE: 109 BPM | HEIGHT: 66 IN | WEIGHT: 227 LBS | SYSTOLIC BLOOD PRESSURE: 165 MMHG | BODY MASS INDEX: 36.48 KG/M2 | DIASTOLIC BLOOD PRESSURE: 98 MMHG

## 2024-04-17 DIAGNOSIS — E11.42 DIABETIC POLYNEUROPATHY ASSOCIATED WITH TYPE 2 DIABETES MELLITUS (H): ICD-10-CM

## 2024-04-17 DIAGNOSIS — I63.9 CEREBRAL ISCHEMIC STROKE DUE TO GLOBAL HYPOPERFUSION WITH WATERSHED INFARCT (H): ICD-10-CM

## 2024-04-17 DIAGNOSIS — G31.84 MCI (MILD COGNITIVE IMPAIRMENT): Primary | ICD-10-CM

## 2024-04-17 PROCEDURE — G2211 COMPLEX E/M VISIT ADD ON: HCPCS | Performed by: PSYCHIATRY & NEUROLOGY

## 2024-04-17 PROCEDURE — 99214 OFFICE O/P EST MOD 30 MIN: CPT | Performed by: PSYCHIATRY & NEUROLOGY

## 2024-04-17 ASSESSMENT — MONTREAL COGNITIVE ASSESSMENT (MOCA)
11. FOR EACH PAIR OF WORDS, WHAT CATEGORY DO THEY BELONG TO (OUT OF 2): 2
13. ORIENTATION SUBSCORE: 6
10. [FLUENCY] NAME WORDS STARTING WITH DESIGNATED LETTER: 0
WHAT LEVEL OF EDUCATION WAS ATTAINED: 0
WHAT IS THE TOTAL SCORE (OUT OF 30): 19
12. MEMORY INDEX SCORE: 2
9. REPEAT EACH SENTENCE: 0
7. [VIGILENCE] TAP WHEN HEARING DESIGNATED LETTER: 1
VISUOSPATIAL/EXECUTIVE SUBSCORE: 4
8. SERIAL SUBTRACTION OF 7S: 1
6. READ LIST OF DIGITS [FORWARD/BACKWARD]: 1
4. NAME EACH OF THE THREE ANIMALS SHOWN: 2

## 2024-04-17 NOTE — NURSING NOTE
Chief Complaint   Patient presents with    Mild cognitive impairment     Neuropsych completed on 2/21/24. Pt feels memory is about the same. He has been doing crossword puzzles and watching Jeopardy and Wheel of Duglas Yanes LPN on 4/17/2024 at 9:59 AM

## 2024-04-17 NOTE — NURSING NOTE
[facility] COGNITIVE ASSESSMENT (MOCA)  Version 7.1 Original Version  VISUOSPATIAL/EXECUTIVE               COPY CUBE      [  1  ]                                [  1  ] DRAW CLOCK (Ten past eleven)  (3 points)    [ 1   ]                    [  1  ]               [  0  ]       Contour            Numbers     Hands POINTS                   4/ 5   NAMING    [  1 ]                                                                        [  0  ]                                             [  1  ]  Lialexey Corrigan                                Camel                     2/ 3   MEMORY Read list of words, subject must repeat them. Do 2 trials, even if 1st trial is successful. Do a recall after 5 minutes  FACE VEL[facility] [employer] NIKKI RED No Points    1st          2nd         ATTENTION Read list of digits (1 digit/sec) Subject has to repeat in the forward order       [ 0   ]   2  1  8  5  4                                [  1  ] 7 4 2                         1 /2   Read list of letters. The subject must tap with his hand at each letter A. No points if > 2 errors.  [  1  ] F B A C M N A A J K L B A F A K D E A A A J A M O F A A B             1 /1   Serial 7 subtraction starting at 100          [  1  ] 93         [   0 ] 86          [  0  ] 79          [  0  ] 72         [  0  ] 65   4 or 5 correct subtractions: 3 points,  2 or 3 correct: 2 points,  1correct: 1 point,   0 correct: 0 points           1 /3   LANGUAGE Repeat: I only know that Luke is the one to help today. [  0   ]                                      The cat always hid under the couch when dogs were in the room. [  0 ]               0/2   Fluency: Name maximum number of words in one minute that begin with the letter F                                                                                                                    [  0  ] _4__ (N > 11 words)              0 /1   ABSTRACTION Similarity  between e.g. banana-orange=fruit                                                                   [  1  ] train-bicycle                      [   1 ] watch-ruler              2/2   DELAYED  RECALL Has to recall words  WITH NO CUE FACE  [  0  ] VELVET  [   1 ] Yazdanism  [ 0   ]  NIKKI  [ 0   ] RED  [ 1   ] Points for UNCUED recall only            2/5           OPTIONAL Category cue           Multiple choice cue          ORIENTATION  [   1 ] Date     [ 1   ] Month       [  1  ] Year      [  1  ] Day      [  1  ] Place        [   1 ] City          6/6   TOTAL  Normal > 26/30 Add 1 point if < 12 years education      19 /30

## 2024-04-17 NOTE — LETTER
"    4/17/2024         RE: Chemo Casillas  [address]  [place] [address]        Dear Colleague,    Thank you for referring your patient, Chemo Casillas, to the [facility] [place]. Please see a copy of my visit note below.    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  Family is now more involved with patient and feel that he has had some \"improvement\"    Patient feels he is stable  Does do crossword puzzles  Watches [employer] and [employer]    Family with monitoring meds  They are making sure that he is eating properly  He has stabilized    We did discuss that if he had rapid fluctuations in functioning we could reconsider Keppra  We talked about Aricept and its side effects of lightheadedness/heartburn/diarrhea we will hold off on those medicines at this time  We talked about the fact that his MoCA testing could fluctuate over time  Follow-up in a year sooner if there is problems    Review of workup for cognitive difficulties:  EEG 9/1/2023  Impression moderately abnormal awake EEG due to:  Rare paroxysmal burst of generalized delta slowing lasting 1-2 seconds without intermixed spikes  Clinical correlation  This could be indicative of lowered seizure threshold    MRI scan brain 8/29/2023 compared to 10/24/2027 similar  Patient with bilateral watershed ischemic " "changes.      Formal neuropsychometric test 2/21/2024 reviewed  Moderate impairment and nonverbal learning and memory  Otherwise basic attention/speed of thinking/spatial skills/verbal learning/verbal memory stable.  As compared to testing 7 years ago there are declines and measures of nonverbal learning otherwise scores are remarkably stable to improved.  No evidence neurodegenerative process  Cognitive lapses in daily life best explained by normal aging on a brain that has already been compromised.    Discussed with patient and family member at length about possibility of lowered seizure threshold  Talked about the option of adding Keppra due to the paroxysmal nature of the EEG to see if this would help stabilize things  Discussed at length that he may not have true epilepsy that this would be a trial of medication low-dose Keppra 250 mg twice daily.  Family members help with all of his medicines and the medication fontanez so that he can remember them    Previously had discussed empiric Keppra treatment  Had recommended Keppra 250 mg twice daily  Patient chose not to go on medication            A.  Memory loss/infantile water shed strokes/premorbid low IQ        Multifactorial cognitive difficulty         Presentation history:       History of neuropsych testing at [facility] in 2017       Family members feel that patient has progressed             Patient is not caring for his hygiene       Patient's house is not Clean watches TV all day does not leave the home       Family feels he should be in assisted living         Patient had difficulties with household tasks and caring for himself back in 2017 evaluated by Dr. 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  Per family previous testing in the past showed \"stroke at birth\"       Concerned that he would have dementia as he aged    C.  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  Vascular risk factors        Hypertension/hyperlipidemia/diabetes/CKD/cardiomyopathy    Past medical history  History  watershed stroke  Cognitive difficulties  Hypertension  Hyperlipidemia  Diabetes  Cardiomyopathy  CKD  Cataracts    Habits  Non-smoker  Alcohol less than 1/month rare    Family history   Mother diabetes  Father melanoma  Brother endocarditis        Workup  Neuropsych testing 2017  Previous MRI of the head 2017 per verbal report may be showed watershed strokes chronic  Previous neuropsychometric testing   1.  Low average intellectual function  Scale levels third-fifth grade level    MRI brain 2023 compared to 10/24/2017  1.  No acute/subacute infarcts, mass lesions, hydrocephalus or MRI evidence of intracranial hemorrhage.  2.  Mild diffuse cerebral parenchymal volume loss.         Presumed chronic hypertensive/microvascular ischemic white matter changes.  3.  Redemonstration of symmetric areas of encephalomalacia involving        the bilateral frontoparietal and occipital regions in a watershed type distribution.  EEG 2023  Impression moderately abnormal awake EEG due to:  Rare paroxysmal burst of generalized delta slowing lasting 1-2 seconds without intermixed spikes  Clinical correlation  This could be " indicative of lowered seizure threshold    Formal neuropsychometric test 2/21/2024 reviewed  Moderate impairment and nonverbal learning and memory  Otherwise basic attention/speed of thinking/spatial skills/verbal learning/verbal memory stable.  As compared to testing 7 years ago there are declines and measures of nonverbal learning otherwise scores are remarkably stable to improved.  No evidence neurodegenerative process  Cognitive lapses in daily life best explained by normal aging on a brain that has already been compromised.      Laboratory data                       7/2023            3/2024  NA/K            140/3.9           140/4.3  BUN/Cr        13.9/0.88        19.9/0.82  GLU             147                 107  AST              32  WBC/HGB    7.6/15.9  PLTs             191,000    HGBA1C      7.1                   6.3  HDL/LDL      42/122  TSH              2.9  B12               575      MoCA  8/11/2023,      22 out of 30  4/17/2024,     19 out of 30      Exam    Review of system    Difficulty with memory    No headache no chest pain no shortness of breath no nausea vomiting no diarrhea no fever chills    No diplopia dysarthria dysphagia  No focal visual changes  No focal weakness  No focal numbness    Complains of slow gait  Some balance problems  Slightly slurred speech    No bladder difficulties but does have impotence    Otherwise review systems negative    General exam  Blood pressure blood pressure 181/95, pulse 109  Alert and attentive  HEENT adequate hearing  Lungs clear  Heart rate regular  Abdomen soft  Symmetrical pulses    Neurologic exam  Normal prosody of speech  Normal naming  Normal comprehension  Normal repetition  No aphasia  No neglect    MoCA  8/11/2023         22 out of 30  4/17/2024,       19 out of 30      Cranial nerves II through XII  No ophthalmoplegia  No nystagmus  No visual field cut  Speech clear  Face symmetrical  Tongue twisters okay    Upper extremities  No drift  No  tremor    Lower extremities  Distal proximal strength okay    Gait  Gets up from the chair okay   Walks up and down the [place] turns reasonably no significant gait apraxia          Assessment/plan    Cognitive difficulties coming on worse over the last 2 years        Previous difficulty with memory and cognition going on at least prior to 2017    2.   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  Previous neuropsychometric testing in 2017 showed functioning at level third-fifth grade level       Repeat neuropsych testing 2/21/2024       Moderate impairment and nonverbal learning and memory       Otherwise basic attention/speed of thinking/spatial skills/verbal learning/verbal memory stable.        As compared to testing 7 years ago there are declines and measures of nonverbal learning otherwise scores are remarkably stable to improved.       No evidence neurodegenerative process       Cognitive lapses in daily life best explained by normal aging on a brain that has already been compromised.       4.  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  Decided not to go on the Keppra  Family members help with all of his medicines and the fontanez so that he can remember them        Family with monitoring meds  They are making sure that he is eating properly  He has stabilized    We did discuss that if he had rapid fluctuations in functioning we could reconsider Keppra  We talked about Aricept and its side effects of lightheadedness/heartburn/diarrhea we will hold off on those medicines at this time  We talked about the fact that his MoCA testing could fluctuate over time  Follow-up in a year sooner if there is problems    Discussed with patient and patient's sister-in-law    Follow-up in 1 year    Total care time today 32 minutes    As part of visit today  Reviewed laboratory data  Reviewed primary MD note 3/4/2024  Reviewed neuropsychometric testing      Addendum 3/8/2024  Formal neuropsychometric test 2/21/2024 reviewed  Moderate impairment and nonverbal learning and memory  Otherwise basic attention/speed of thinking/spatial skills/verbal learning/verbal memory stable.  As compared to testing 7 years ago there are declines and measures of nonverbal learning otherwise scores are remarkably stable to improved.  No evidence neurodegenerative process  Cognitive lapses in daily life best explained by anormal EEG on a brain that has already been compromised.        Again, thank you for allowing me to participate in the care of your patient.        Sincerely,        david Mahmood MD

## 2024-04-26 ENCOUNTER — PATIENT OUTREACH (OUTPATIENT)
Dept: CARE COORDINATION | Facility: CLINIC | Age: 74
End: 2024-04-26
Payer: MEDICARE

## 2024-04-26 NOTE — PROGRESS NOTES
Clinic Care Coordination Contact  Community Health Worker Follow Up    Care Gaps:     Health Maintenance Due   Topic Date Due    ZOSTER IMMUNIZATION (1 of 2) Never done    RSV VACCINE (Pregnancy & 60+) (1 - 1-dose 60+ series) Never done    DTAP/TDAP/TD IMMUNIZATION (2 - Td or Tdap) 01/16/2018    COVID-19 Vaccine (8 - 2023-24 season) 04/08/2024    HEMOGLOBIN  07/21/2024       Care Gaps Last addressed on 4/26/2024    Care Plan:   Care Plan: Help At Home       Problem: Insufficient In-home support       Long-Range Goal: i will live safely in my home with support as needed.       Start Date: 7/27/2023 Expected End Date: 7/26/2024    This Visit's Progress: 40% Recent Progress: 30%    Priority: High    Note:     Barriers: may not recognize what is needed.   Strengths: family support.   Patient expressed understanding of goal: sister in law does  Action steps to achieve this goal:  1. Sister in law will talk to patient about supports to help at home.   2. Sister in law will arrange for supports if he is willing.  3. Sister hollis cook will report progress towards this goal at scheduled outreach telephone calls from the CCC team.    Discussed with Suzy, doing well at this time- no concerns  Discussed 2/5 with Suzy, family continues to help Chemo with house work etc.  Discussed 11/21 with [facility], family has come together to help with bills, de cluttering, organizing his house  Discussed 8/24 with [facility]                            Care Plan: Medication Regimen       Problem: Medication Adherence       Long-Range Goal: Consistently take Medications as Prescribed       Start Date: 7/27/2023 Expected End Date: 7/26/2024    This Visit's Progress: 50% Recent Progress: 40%    Priority: High    Note:     Barriers: has not been taking meds for over a year.   Strengths: family can set up meds in pill boxes and monitor.  Patient expressed understanding of goal: sister in law does.  Action steps to achieve this goal:  1. Sister in law will work with  pharmacy to get medications sent to her home.   2. Sister in law will set up pill boxes with patient.  3. Sister hollsi cook will monitor and work with [facility] pharmacist as needed.  4. Sister hollis cook will report progress towards this goal at scheduled outreach telephone calls from the [facility] team.    4/26- discussed- doing well, Suzy is setting up medications, no concerns  2/5/2024- discussed, Suzy continues to set up patient medications, patient remains compliant  11/21- discussed with Suzy- She is setting up medication box every Sunday 8/24 discussed, HUMBERTO Almonte is setting up med box weekly, will be looking into new med box for 2 week duration.                            Care Plan: Specialty Referral       Problem: Patient is in need of specialty care       Goal: I will have neuro-psych appt to assess my cognition.       Start Date: 7/27/2023 Expected End Date: 1/1/2024    This Visit's Progress: 90% Recent Progress: 70%    Priority: Medium    Note:     Barriers:none noted.   Strengths: have had one done before and can hopefully go back to same clinic.  Family support.   Patient expressed understanding of goal: sister in law does.  Action steps to achieve this goal:  1. Sister in law will schedule appt.and will bring him to appt.   2. Sister hollis cook will report progress towards this goal at scheduled outreach telephone calls from the [facility] team.    4/26- Evaluation completed, next appt scheduled 4/18/25 2/5/2024- follow up neuro appt on 2/21/2024 and 4/17/2024 11/21 completed eval, follow up appt scheduled 2/21/2024 8/24 discussed with HUMBERTO Almonte, was able to get in quick due to cancellation, first appt was on 8/17, needs MRI and EKG follow up appt is scheduled for 9/1/2023                              Care Plan: Community Resources       Problem: Lack of transportation               Problem: Insufficient In-home support       Long-Range Goal: [place] to see if I can get in home supports.       Start Date:  "7/27/2023 Expected End Date: 7/26/2024    This Visit's Progress: On Hold Recent Progress: 10%    Priority: Medium    Note:     Barriers: none noted  Strengths: family can help.   Patient expressed understanding of goal: sister in law set up.  Action steps to achieve this goal:  1. Sister in law will [place] and be with patient at assessment.  2. Sister in law will help complete all paper work.  3. Sister in law will report progress towards this goal at scheduled outreach telephone calls from the CCC team.    Discussed- Eldery waiver- no need at this time per Jacklyn Suzy  Dicussed- no update at this time  Discussed 11/21 no update  Discussed 8/24 with Suzy, scheduled follow up SW call to discuss alternative elderly waiver                               Intervention and Education during outreach: Supportive Listening    CHW spoke to [facility] Suzy today for monthly CC outreach call, suzy states things are well.  Neuro/Psych eval was completed, they have one year follow up appointment scheduled 4/18/2025.  Suzy continues to set up patient's medication, no concerns at this time.  CHW updated status of elderly waiver goal to \"on hold\" per Suzy patient did not qualify- [place] Cho[place]  CC Goal progression updated    Next PCP appointment scheduled= 5/31/2024 @ 11:00 am    CHW Plan: Continue with monthly outreach call to discuss, support and update CC goal progression    Next CHW outreach call: 5/27/2024    Glenis ROSENBAUM  Community Health Worker  [facility]  Clinic Care Coordination  [email or web address] [email or web address]  [email or web address]  Office: [phone]      "

## 2024-05-03 ENCOUNTER — PATIENT OUTREACH (OUTPATIENT)
Dept: CARE COORDINATION | Facility: CLINIC | Age: 74
End: 2024-05-03
Payer: MEDICARE

## 2024-05-03 NOTE — PROGRESS NOTES
Care Coordination Clinician Chart Review    Situation: Patient chart reviewed by Care Coordinator.       Background: Care Coordination Program started: 7/21/2023. Initial assessment completed and patient-centered care plan(s) were developed with participation from patient. Lead CC handed patient off to CHW for continued outreaches.       Assessment: Per chart review, patient outreach completed by CC CHW on 4-.  Patient is actively working to accomplish goal(s). Patient's goal(s) appropriate and relevant at this time. Patient is not due for updated Plan of Care.  Assessments will be completed annually or as needed/with change of patient status.      Care Plan: Help At Home       Problem: Insufficient In-home support       Long-Range Goal: i will live safely in my home with support as needed.       Start Date: 7/27/2023 Expected End Date: 7/26/2024    This Visit's Progress: 40% Recent Progress: 30%    Priority: High    Note:     Barriers: may not recognize what is needed.   Strengths: family support.   Patient expressed understanding of goal:  in  does  Action steps to achieve this goal:  1.  in  will talk to patient about supports to help at home.   2.  in  will arrange for supports if he is willing.  3.  in law will report progress towards this goal at scheduled outreach telephone calls from the CCC team.    Discussed with Suzy, doing well at this time- no concerns  Discussed 2/5 with Suzy, family continues to help Chemo with house work etc.  Discussed 11/21 with [facility], family has come together to help with bills, de cluttering, organizing his house  Discussed 8/24 with [facility]                            Care Plan: Medication Regimen       Problem: Medication Adherence       Long-Range Goal: Consistently take Medications as Prescribed       Start Date: 7/27/2023 Expected End Date: 7/26/2024    This Visit's Progress: 50% Recent Progress: 40%    Priority: High    Note:     Barriers: has  not been taking meds for over a year.   Strengths: family can set up meds in pill boxes and monitor.  Patient expressed understanding of goal: sister in law does.  Action steps to achieve this goal:  1. Sister in law will work with pharmacy to get medications sent to her home.   2. Sister in law will set up pill boxes with patient.  3. Sister hollis cook will monitor and work with [facility] pharmacist as needed.  4. Sister hollis cook will report progress towards this goal at scheduled outreach telephone calls from the [facility] team.    4/26- discussed- doing well, Suzy is setting up medications, no concerns  2/5/2024- discussed, Suzy continues to set up patient medications, patient remains compliant  11/21- discussed with Suzy- She is setting up medication box every Sunday 8/24 discussed, HUMBERTO Almonte is setting up med box weekly, will be looking into new med box for 2 week duration.                            Care Plan: Specialty Referral       Problem: Patient is in need of specialty care       Goal: I will have neuro-psych appt to assess my cognition.       Start Date: 7/27/2023 Expected End Date: 1/1/2024    This Visit's Progress: 90% Recent Progress: 70%    Priority: Medium    Note:     Barriers:none noted.   Strengths: have had one done before and can hopefully go back to same clinic.  Family support.   Patient expressed understanding of goal: sister in law does.  Action steps to achieve this goal:  1. Sister in law will schedule appt.and will bring him to appt.   2. Sister hollis cook will report progress towards this goal at scheduled outreach telephone calls from the [facility] team.    4/26- Evaluation completed, next appt scheduled 4/18/25 2/5/2024- follow up neuro appt on 2/21/2024 and 4/17/2024 11/21 completed eval, follow up appt scheduled 2/21/2024 8/24 discussed with HUMBERTO Almonte, was able to get in quick due to cancellation, first appt was on 8/17, needs MRI and EKG follow up appt is scheduled for 9/1/2023                              Care  Plan: Community Resources       Problem: Lack of transportation               Problem: Insufficient In-home support       Long-Range Goal: [place] to see if I can get in home supports.       Start Date: 7/27/2023 Expected End Date: 7/26/2024    This Visit's Progress: On Hold Recent Progress: 10%    Priority: Medium    Note:     Barriers: none noted  Strengths: family can help.   Patient expressed understanding of goal: sister in law set up.  Action steps to achieve this goal:  1. Sister in law will [place] and be with patient at assessment.  2. Sister in law will help complete all paper work.  3. Sister in law will report progress towards this goal at scheduled outreach telephone calls from the CCC team.    Discussed- Eldery waiver- no need at this time per Jacklyn Duarte  Dicussed- no update at this time  Discussed 11/21 no update  Discussed 8/24 with Suzy, scheduled follow up SW call to discuss alternative elderly waiver                                  Plan/Recommendations: The patient will continue working with Care Coordination to achieve goal(s) as above. CHW will continue outreaches at minimum every 30 days and will involve Lead CC as needed or if patient is ready to move to Maintenance. Lead CC will continue to monitor CHW outreaches and patient's progress to goal(s) every 6 weeks.     Plan of Care updated and sent to patient: No

## 2024-05-29 ENCOUNTER — PATIENT OUTREACH (OUTPATIENT)
Dept: CARE COORDINATION | Facility: CLINIC | Age: 74
End: 2024-05-29
Payer: MEDICARE

## 2024-05-29 NOTE — PROGRESS NOTES
Clinic Care Coordination Contact  [facility]/Voicemail    Clinical Data: Care Coordinator Outreach    Outreach Documentation Number of Outreach Attempt   5/29/2024  12:29 PM 1       Left message on  patient's HUMBERTO Suzy  voicemail with call back information and requested return call.    Plan: Care Coordinator CHW to discuss and update current CC goal progression  Care Coordinator will try to reach patient again in 10 business days= 6/7/2024    CC Goals:  In Home Support  Medication Compliance  Neuro-psych- follow up (eval completed)  [place] Ass[place])    Next PCP appointment= 5/31/2024 @ 11:00 am    Glenis ROSENBAUM  Community Health Worker  [facility] Care Coordination  [email or web address]  [email or web address]  Office: [phone]

## 2024-05-31 ENCOUNTER — OFFICE VISIT (OUTPATIENT)
Dept: INTERNAL MEDICINE | Facility: CLINIC | Age: 74
End: 2024-05-31
Payer: MEDICARE

## 2024-05-31 VITALS
HEART RATE: 101 BPM | DIASTOLIC BLOOD PRESSURE: 70 MMHG | OXYGEN SATURATION: 97 % | HEIGHT: 66 IN | TEMPERATURE: 98.7 F | BODY MASS INDEX: 34.87 KG/M2 | RESPIRATION RATE: 20 BRPM | SYSTOLIC BLOOD PRESSURE: 128 MMHG | WEIGHT: 217 LBS

## 2024-05-31 DIAGNOSIS — E11.42 DIABETIC POLYNEUROPATHY ASSOCIATED WITH TYPE 2 DIABETES MELLITUS (H): ICD-10-CM

## 2024-05-31 DIAGNOSIS — E66.812 CLASS 2 SEVERE OBESITY DUE TO EXCESS CALORIES WITH SERIOUS COMORBIDITY AND BODY MASS INDEX (BMI) OF 35.0 TO 35.9 IN ADULT (H): ICD-10-CM

## 2024-05-31 DIAGNOSIS — I10 ESSENTIAL HYPERTENSION: ICD-10-CM

## 2024-05-31 DIAGNOSIS — N18.31 STAGE 3A CHRONIC KIDNEY DISEASE (H): ICD-10-CM

## 2024-05-31 DIAGNOSIS — E78.2 MIXED HYPERLIPIDEMIA: ICD-10-CM

## 2024-05-31 DIAGNOSIS — E11.3293 TYPE 2 DIABETES MELLITUS WITH BOTH EYES AFFECTED BY MILD NONPROLIFERATIVE RETINOPATHY WITHOUT MACULAR EDEMA, WITHOUT LONG-TERM CURRENT USE OF INSULIN (H): Primary | ICD-10-CM

## 2024-05-31 DIAGNOSIS — E66.01 CLASS 2 SEVERE OBESITY DUE TO EXCESS CALORIES WITH SERIOUS COMORBIDITY AND BODY MASS INDEX (BMI) OF 35.0 TO 35.9 IN ADULT (H): ICD-10-CM

## 2024-05-31 PROCEDURE — G2211 COMPLEX E/M VISIT ADD ON: HCPCS | Performed by: INTERNAL MEDICINE

## 2024-05-31 PROCEDURE — 99214 OFFICE O/P EST MOD 30 MIN: CPT | Performed by: INTERNAL MEDICINE

## 2024-05-31 RX ORDER — RESPIRATORY SYNCYTIAL VIRUS VACCINE 120MCG/0.5
0.5 KIT INTRAMUSCULAR ONCE
Qty: 1 EACH | Refills: 0 | Status: CANCELLED | OUTPATIENT
Start: 2024-05-31 | End: 2024-05-31

## 2024-05-31 ASSESSMENT — PAIN SCALES - GENERAL: PAINLEVEL: NO PAIN (0)

## 2024-05-31 NOTE — PROGRESS NOTES
"  Office Visit - Follow Up   Chemo Casillas   74 year old male    Date of Visit: 5/31/2024    Chief Complaint   Patient presents with    Follow Up     3 month follow up diabetic polyneuropathy associated with type 2 diabetes mellitus.        Assessment and Plan   1. Type 2 diabetes mellitus with both eyes affected by mild nonproliferative retinopathy without macular edema, without long-term current use of insulin (H)  Has been well-controlled, continue same    2. Diabetic polyneuropathy associated with type 2 diabetes mellitus (H)  Excellent diabetic footcare    3. Stage 3a chronic kidney disease (H)  Stable continue lisinopril    4. Mixed hyperlipidemia  Continue statin    5. Essential hypertension  Blood pressure okay continue same    6. Class 2 severe obesity due to excess calories with serious comorbidity and body mass index (BMI) of 35.0 to 35.9 in adult (H)  Discussed importance of reduction in calories, carbohydrates regular exercise and modest weight loss    Return in about 3 months (around 8/31/2024) for Routine preventive.     History of Present Illness   This 74 year old man comes in for follow-up.  We reviewed his neurology visit.  Overall doing well.  Weight down a bit.  Lots of help from family and taking his medications regularly.       Physical Exam   General Appearance:   No acute distress    /70   Pulse 101   Temp 98.7  F (37.1  C) (Tympanic)   Resp 20   Ht 1.676 m (5' 6\")   Wt 98.4 kg (217 lb)   SpO2 97%   BMI 35.02 kg/m           Additional Information   Current Outpatient Medications   Medication Sig Dispense Refill    amLODIPine (NORVASC) 10 MG tablet Take 1 tablet (10 mg) by mouth daily 90 tablet 4    aspirin 81 MG EC tablet Take 1 tablet (81 mg) by mouth daily 90 tablet 4    atorvastatin (LIPITOR) 20 MG tablet Take 1 tablet (20 mg) by mouth daily 90 tablet 4    finasteride (PROSCAR) 5 MG tablet Take 1 tablet (5 mg) by mouth daily 90 tablet 4    lisinopril (ZESTRIL) 20 MG tablet " Take 1 tablet (20 mg) by mouth daily 90 tablet 4    metFORMIN (GLUCOPHAGE) 1000 MG tablet Take 1 tablet (1,000 mg) by mouth 2 times daily (with meals) 180 tablet 4    tamsulosin (FLOMAX) 0.4 MG capsule Take 1 capsule (0.4 mg) by mouth daily 90 capsule 4       Time:     The longitudinal plan of care for the diagnosis(es)/condition(s) as documented were addressed during this visit. Due to the added complexity in care, I will continue to support Chemo in the subsequent management and with ongoing continuity of care.     Cody Pacheco MD  Answers submitted by the patient for this visit:  Hypertension Visit (Submitted on 5/31/2024)  Chief Complaint: Chronic problems general questions HPI Form  Do you check your blood pressure regularly outside of the clinic?: No  Are your blood pressures ever more than 140 on the top number (systolic) OR more than 90 on the bottom number (diastolic)? (For example, greater than 140/90): Yes  Are you following a low salt diet?: Yes  General Questionnaire (Submitted on 5/31/2024)  Chief Complaint: Chronic problems general questions HPI Form  How many servings of fruits and vegetables do you eat daily?: 0-1  On average, how many sweetened beverages do you drink each day (Examples: soda, juice, sweet tea, etc.  Do NOT count diet or artificially sweetened beverages)?: 0  How many minutes a day do you exercise enough to make your heart beat faster?: 9 or less  How many days a week do you exercise enough to make your heart beat faster?: 3 or less  How many days per week do you miss taking your medication?: 0

## 2024-06-10 ENCOUNTER — PATIENT OUTREACH (OUTPATIENT)
Dept: CARE COORDINATION | Facility: CLINIC | Age: 74
End: 2024-06-10
Payer: MEDICARE

## 2024-06-10 NOTE — PROGRESS NOTES
Clinic Care Coordination Contact  [facility]/Voicemail    Clinical Data: Care Coordinator Outreach    Outreach Documentation Number of Outreach Attempt   5/29/2024  12:29 PM 1   6/10/2024   9:59 AM 2       Left message on  patient's HUMBERTO Suzy  voicemail with call back information and requested return call.    Plan: Care Coordinator will send unable to contact letter with care coordinator contact information via [employer]. Care Coordinator will try to reach patient again in 3 weeks= 7/1/2024.    CC Goals:  Med Compliance  In home support  Neuro-psych follow up  [place]    Next PCP appointment scheduled= 9/6/2024    Glenis ROSENBAUM  Community Health Worker  [facility] Care Coordination  Kat [email or web address]  [email or web address]  Office: [phone]

## 2024-06-13 ENCOUNTER — PATIENT OUTREACH (OUTPATIENT)
Dept: CARE COORDINATION | Facility: CLINIC | Age: 74
End: 2024-06-13
Payer: MEDICARE

## 2024-06-13 NOTE — PROGRESS NOTES
Care Coordination Clinician Chart Review    Situation: Patient chart reviewed by Care Coordinator.       Background: Care Coordination Program started: 7/21/2023. Initial assessment completed and patient-centered care plan(s) were developed with participation from patient. Lead CC handed patient off to CHW for continued outreaches.       Assessment: Per chart review, patient outreach completed by CC CHW on 6- leaving VM x2.  Patient is actively working to accomplish goal(s). Patient's goal(s) appropriate and relevant at this time. Patient is due for updated Plan of Care.  Assessments will be completed annually or as needed/with change of patient status.      Care Plan: Help At Home       Problem: Insufficient In-home support       Long-Range Goal: i will live safely in my home with support as needed.       Start Date: 7/27/2023 Expected End Date: 7/26/2024    This Visit's Progress: 40% Recent Progress: 30%    Priority: High    Note:     Barriers: may not recognize what is needed.   Strengths: family support.   Patient expressed understanding of goal:  in  does  Action steps to achieve this goal:  1.  in  will talk to patient about supports to help at home.   2.  in  will arrange for supports if he is willing.  3.  in law will report progress towards this goal at scheduled outreach telephone calls from the CCC team.    Discussed with Suzy, doing well at this time- no concerns  Discussed 2/5 with Suzy, family continues to help Chemo with house work etc.  Discussed 11/21 with [facility], family has come together to help with bills, de cluttering, organizing his house  Discussed 8/24 with [facility]                            Care Plan: Medication Regimen       Problem: Medication Adherence       Long-Range Goal: Consistently take Medications as Prescribed       Start Date: 7/27/2023 Expected End Date: 7/26/2024    This Visit's Progress: 50% Recent Progress: 40%    Priority: High    Note:      Barriers: has not been taking meds for over a year.   Strengths: family can set up meds in pill boxes and monitor.  Patient expressed understanding of goal: sister in law does.  Action steps to achieve this goal:  1. Sister in law will work with pharmacy to get medications sent to her home.   2. Sister in law will set up pill boxes with patient.  3. Sister hollis cook will monitor and work with [place] pharmacist as needed.  4. Sister hollis cook will report progress towards this goal at scheduled outreach telephone calls from the [facility] team.    4/26- discussed- doing well, Suzy is setting up medications, no concerns  2/5/2024- discussed, Suzy continues to set up patient medications, patient remains compliant  11/21- discussed with Suzy- She is setting up medication box every Sunday 8/24 discussed, HUMBERTO Almonte is setting up med box weekly, will be looking into new med box for 2 week duration.                            Care Plan: Specialty Referral       Problem: Patient is in need of specialty care       Goal: I will have neuro-psych appt to assess my cognition.       Start Date: 7/27/2023 Expected End Date: 1/1/2024    This Visit's Progress: 90% Recent Progress: 70%    Priority: Medium    Note:     Barriers:none noted.   Strengths: have had one done before and can hopefully go back to same clinic.  Family support.   Patient expressed understanding of goal: sister in law does.  Action steps to achieve this goal:  1. Sister in law will schedule appt.and will bring him to appt.   2. Sister hollis cook will report progress towards this goal at scheduled outreach telephone calls from the [facility] team.    4/26- Evaluation completed, next appt scheduled 4/18/25 2/5/2024- follow up neuro appt on 2/21/2024 and 4/17/2024 11/21 completed eval, follow up appt scheduled 2/21/2024 8/24 discussed with HUMBERTO Almonte, was able to get in quick due to cancellation, first appt was on 8/17, needs MRI and EKG follow up appt is scheduled for 9/1/2023                               Care Plan: Community Resources       Problem: Lack of transportation               Problem: Insufficient In-home support       Long-[place] Goal: [place] if [place] can get in home supports.       Start Date: 7/27/2023 Expected End Date: 7/26/2024    This Visit's Progress: On Hold Recent Progress: 10%    Priority: Medium    Note:     Barriers: none noted  Strengths: family can help.   Patient expressed understanding of goal: sister in law set up.  Action steps to achieve this goal:  1. Sister in law will [place] and be with patient at assessment.  2. Sister in law will help complete all paper work.  3. Sister in law will report progress towards this goal at scheduled outreach telephone calls from the CCC team.    Discussed- Eldery waiver- no need at this time per Jacklyn Duarte  Dicussed- no update at this time  Discussed 11/21 no update  Discussed 8/24 with Suzy, scheduled follow up SW call to discuss alternative elderly waiver                                  Plan/Recommendations: The patient will continue working with Care Coordination to achieve goal(s) as above. CHW will continue outreaches at minimum every 30 days and will involve Lead CC as needed or if patient is ready to move to Maintenance. Lead CC will continue to monitor CHW outreaches and patient's progress to goal(s) every 6 weeks.     Plan of Care updated and sent to patient: Yes, via [employer]

## 2024-06-13 NOTE — LETTER
[facility] CARE COORDINATION  [facility]    June 13, 2024        Chemo Casillas  [address]  [place] [address]          Dear Chemo,     Benito is an updated Complex Care Plan for your continued enrollment in Care Coordination. Please let us know if you have additional questions, concerns, or goals that we can assist with.    Sincerely,    Sparkle Salazar,   [facility]  [phone]        [facility]  Patient Centered Plan of Care  About Me:        Patient Name:  Chemo Casillas    YOB: 1950  Age:         74 year old   [place] MRN:    [id number] Telephone Information:  Home Phone [phone]   Mobile [phone]       Address:  [address]  [place] [address] Email address:  [email or web address]      Emergency Contact(s)    Name Relationship Lgl Grd Work Phone Home Phone Mobile Phone   1. SHRAVAN CLINE Sister-in-Law   [phone]           Primary language:  English     needed? No   [place] [employer]:  [phone] op. 1  Other communication barriers:Access to technology    Preferred Method of Communication:     Current living arrangement: I live in a private home    Mobility Status/ Medical Equipment: Independent        Health Maintenance  Health Maintenance Reviewed: Due/Overdue   Health Maintenance Due   Topic Date Due    ZOSTER IMMUNIZATION (1 of 2) Never done    RSV VACCINE (Pregnancy & 60+) (1 - 1-dose 60+ series) Never done    DTAP/TDAP/TD IMMUNIZATION (2 - Td or Tdap) 01/16/2018    COVID-19 Vaccine (8 - 2023-24 season) 04/08/2024    EYE EXAM  06/20/2024    HEMOGLOBIN  07/21/2024           My Access Plan  Medical Emergency 911   Primary Clinic Line [facility] - [phone]   24 Hour Appointment Line [phone] or  [phone]) (toll-free)   24 Hour Nurse Line [phone] (toll-free)   Preferred Urgent Care [facility] [phone]      Preferred Hospital [facility]  [phone]     Preferred Pharmacy [id number] - [facility] [address] AT [facility] [address]     Behavioral Health Crisis Line The National Suicide Prevention Lifeline at 1-276.165.5377 or Text/Call 988           My Care Team Members  Patient Care Team         Relationship Specialty Notifications Start End    Cody Pacheco MD PCP - General   2/10/16     Phone: [phone] Fax: [phone] [address] [facility] [address]    Cody Pacheco MD Assigned PCP   4/3/22     Phone: [phone] Fax: [phone] [address] [facility] [address]    Sparkle Salazar LSW Lead Care Coordinator Primary Care - CC Admissions 7/25/23     Phone: [phone]         Farrah Perea CHW Community Health Worker  Admissions 7/27/23     Cody Mahmood MD MD Neurology  8/8/23     Phone: [phone] Fax: [phone] [address] [facility] [address]    Cody Mahmood MD Assigned Neuroscience Provider   8/12/23     Phone: [phone] Fax: [phone]         [address] [facility] [address]    Coco Pacheco, PhD  Assigned Behavioral Health Provider   3/15/24     Phone: [phone] Fax: [phone]         [facility] [address] [facility] [address] [facility] [address]                My Care Plans  Self Management and Treatment Plan    Care Plan  Care Plan: Help At Home       Problem: Insufficient In-home support       Long-Range Goal: i will live safely in my home with support as needed.       Start Date: 7/27/2023 Expected End Date: 7/26/2024    This Visit's Progress: 40% Recent Progress: 30%    Priority: High    Note:     Barriers: may not recognize what is needed.   Strengths: family support.   Patient expressed understanding of goal: sister in law does  Action steps to achieve this goal:  1. Sister in law will talk to patient about supports to help at home.   2. Sister in law  will arrange for supports if he is willing.  3. Sister hollis cook will report progress towards this goal at scheduled outreach telephone calls from the CCC team.    Discussed with Suzy, doing well at this time- no concerns  Discussed 2/5 with Suzy, family continues to help Chemo with house work etc.  Discussed 11/21 with Suzy, family has come together to help with bills, de cluttering, organizing his house  Discussed 8/24 with suzy                            Care Plan: Medication Regimen       Problem: Medication Adherence       Long-Range Goal: Consistently take Medications as Prescribed       Start Date: 7/27/2023 Expected End Date: 7/26/2024    This Visit's Progress: 50% Recent Progress: 40%    Priority: High    Note:     Barriers: has not been taking meds for over a year.   Strengths: family can set up meds in pill boxes and monitor.  Patient expressed understanding of goal: sister in law does.  Action steps to achieve this goal:  1. Sister in law will work with pharmacy to get medications sent to her home.   2. Sister in law will set up pill boxes with patient.  3. Sister hollis cook will monitor and work with [facility] pharmacist as needed.  4. Sister hollis cook will report progress towards this goal at scheduled outreach telephone calls from the [facility] team.    4/26- discussed- doing well, Suzy is setting up medications, no concerns  2/5/2024- discussed, Suzy continues to set up patient medications, patient remains compliant  11/21- discussed with Suzy- She is setting up medication box every Sunday 8/24 discussed, HUMBERTO Suzy is setting up med box weekly, will be looking into new med box for 2 week duration.                            Care Plan: Specialty Referral       Problem: Patient is in need of specialty care       Goal: I will have neuro-psych appt to assess my cognition.       Start Date: 7/27/2023 Expected End Date: 1/1/2024    This Visit's Progress: 90% Recent Progress: 70%    Priority: Medium    Note:     Barriers:none noted.    Strengths: have had one done before and can hopefully go back to same clinic.  Family support.   Patient expressed understanding of goal: sister in law does.  Action steps to achieve this goal:  1. Sister in law will schedule appt.and will bring him to appt.   2. Sister hollis cook will report progress towards this goal at scheduled outreach telephone calls from the [facility] team.    4/26- Evaluation completed, next appt scheduled 4/18/25 2/5/2024- follow up neuro appt on 2/21/2024 and 4/17/2024 11/21 completed eval, follow up appt scheduled 2/21/2024 8/24 discussed with HUMBERTO Almonte, was able to get in quick due to cancellation, first appt was on 8/17, needs MRI and EKG follow up appt is scheduled for 9/1/2023                              Care Plan: Community Resources       Problem: Lack of transportation               Problem: Insufficient In-home support       Long-Range Goal: [place] if [place] can get in home supports.       Start Date: 7/27/2023 Expected End Date: 7/26/2024    This Visit's Progress: On Hold Recent Progress: 10%    Priority: Medium    Note:     Barriers: none noted  Strengths: family can help.   Patient expressed understanding of goal: sister in law set up.  Action steps to achieve this goal:  1.  in  [place] and be with patient at assessment.  2. Sister hollis cook will help complete all paper work.  3.  in  will report progress towards this goal at scheduled outreach telephone calls from the CCC team.    Discussed- Eldery waiver- no need at this time per Humberto Almonte  Dicussed- no update at this time  Discussed 11/21 no update  Discussed 8/24 with Suzy, scheduled follow up SW call to discuss alternative elderly waiver                               Advance Care Plans/Directives:   Advanced Care Plan/Directives on file:   No    Discussed with patient/caregiver(s):   Declined Further Information             My Medical and Care Information  Problem  List   Patient Active Problem List   Diagnosis    Essential hypertension    Type 2 diabetes mellitus with both eyes affected by mild nonproliferative retinopathy without macular edema, without long-term current use of insulin (H)    Mixed hyperlipidemia    Stage 3a chronic kidney disease (H)    Diabetic polyneuropathy associated with type 2 diabetes mellitus (H)    Class 2 severe obesity due to excess calories with serious comorbidity in adult (H)      Current Medications and Allergies:    Current Outpatient Medications   Medication Instructions    amLODIPine (NORVASC) 10 mg, Oral, DAILY    aspirin 81 mg, Oral, DAILY    atorvastatin (LIPITOR) 20 mg, Oral, DAILY    finasteride (PROSCAR) 5 mg, Oral, DAILY    lisinopril (ZESTRIL) 20 mg, Oral, DAILY    metFORMIN (GLUCOPHAGE) 1,000 mg, Oral, 2 TIMES DAILY WITH MEALS    tamsulosin (FLOMAX) 0.4 mg, Oral, DAILY         Care Coordination Start Date: 7/21/2023   Frequency of Care Coordination: monthly, more frequently as needed     Form Last Updated: 06/13/2024

## 2024-07-01 ENCOUNTER — PATIENT OUTREACH (OUTPATIENT)
Dept: CARE COORDINATION | Facility: CLINIC | Age: 74
End: 2024-07-01
Payer: MEDICARE

## 2024-07-01 NOTE — PROGRESS NOTES
Contact   Chart Review     Situation: Patient chart reviewed by .    Background: enrolled in care coordination.     Assessment: unable to reach.     Plan/Recommendations: closing to care coordination, letter sent, pcp notified.     Sparkle Salazar,   [facility]  [phone]

## 2024-07-01 NOTE — Clinical Note
Hi, due to being unable to reach, closed to care coordination.  Will let you know if they call our team back.  Thanks, Sparkle

## 2024-07-01 NOTE — LETTER
[facility] CARE COORDINATION  [facility]  July 1, 2024    Chemo Casillas  [address] [place] [address]      Dear Chemo,    I have been unsuccessful in reaching you since our last contact. At this time the Care Coordination team will make no further attempts to reach you, however this does not change your ability to continue receiving care from your providers at your primary care clinic. If you need additional support from a care coordinator in the future please contact Sparkle at [phone].    All of us at [facility] are invested in your health and are here to assist you in meeting your goals.     Sincerely,    Sparkle Salazar,   [facility]  [phone]

## 2024-07-01 NOTE — PROGRESS NOTES
Clinic Care Coordination Contact  [facility]/Voicemail    Clinical Data: Care Coordinator Outreach    Outreach Documentation Number of Outreach Attempt   5/29/2024  12:29 PM 1   6/10/2024   9:59 AM 2   7/1/2024  11:16 AM 3       Left message on  patient's HUMBERTO Suzy's  voicemail with call back information and requested return call.    Plan: Care Coordinator CHW will send chart review request to [facility] to review enrollment status with [facility], unreachable letter was sent via [employer] on 6/10/2024  Care Coordinator will try to reach patient again in 1 month.    Glenis ROSENBAUM  Community Health Worker  [facility] Care Coordination  Kat [email or web address]  [email or web address]  Office: [phone]

## 2024-09-06 ENCOUNTER — OFFICE VISIT (OUTPATIENT)
Dept: INTERNAL MEDICINE | Facility: CLINIC | Age: 74
End: 2024-09-06
Payer: MEDICARE

## 2024-09-06 VITALS
HEART RATE: 70 BPM | HEIGHT: 67 IN | SYSTOLIC BLOOD PRESSURE: 136 MMHG | DIASTOLIC BLOOD PRESSURE: 78 MMHG | BODY MASS INDEX: 34.89 KG/M2 | TEMPERATURE: 97.3 F | RESPIRATION RATE: 11 BRPM | OXYGEN SATURATION: 97 % | WEIGHT: 222.3 LBS

## 2024-09-06 DIAGNOSIS — Z00.00 ANNUAL PHYSICAL EXAM: Primary | ICD-10-CM

## 2024-09-06 DIAGNOSIS — E66.01 CLASS 2 SEVERE OBESITY DUE TO EXCESS CALORIES WITH SERIOUS COMORBIDITY AND BODY MASS INDEX (BMI) OF 35.0 TO 35.9 IN ADULT (H): ICD-10-CM

## 2024-09-06 DIAGNOSIS — E11.42 DIABETIC POLYNEUROPATHY ASSOCIATED WITH TYPE 2 DIABETES MELLITUS (H): ICD-10-CM

## 2024-09-06 DIAGNOSIS — E66.812 CLASS 2 SEVERE OBESITY DUE TO EXCESS CALORIES WITH SERIOUS COMORBIDITY AND BODY MASS INDEX (BMI) OF 35.0 TO 35.9 IN ADULT (H): ICD-10-CM

## 2024-09-06 DIAGNOSIS — N18.31 STAGE 3A CHRONIC KIDNEY DISEASE (H): ICD-10-CM

## 2024-09-06 DIAGNOSIS — E11.3293 TYPE 2 DIABETES MELLITUS WITH BOTH EYES AFFECTED BY MILD NONPROLIFERATIVE RETINOPATHY WITHOUT MACULAR EDEMA, WITHOUT LONG-TERM CURRENT USE OF INSULIN (H): ICD-10-CM

## 2024-09-06 DIAGNOSIS — E78.2 MIXED HYPERLIPIDEMIA: ICD-10-CM

## 2024-09-06 DIAGNOSIS — Z12.5 SCREENING FOR PROSTATE CANCER: ICD-10-CM

## 2024-09-06 DIAGNOSIS — I10 ESSENTIAL HYPERTENSION: ICD-10-CM

## 2024-09-06 LAB
ERYTHROCYTE [DISTWIDTH] IN BLOOD BY AUTOMATED COUNT: 12.4 % (ref 10–15)
HBA1C MFR BLD: 6.8 % (ref 0–5.6)
HCT VFR BLD AUTO: 44.2 % (ref 40–53)
HGB BLD-MCNC: 14.8 G/DL (ref 13.3–17.7)
MCH RBC QN AUTO: 29.5 PG (ref 26.5–33)
MCHC RBC AUTO-ENTMCNC: 33.5 G/DL (ref 31.5–36.5)
MCV RBC AUTO: 88 FL (ref 78–100)
PLATELET # BLD AUTO: 190 10E3/UL (ref 150–450)
RBC # BLD AUTO: 5.01 10E6/UL (ref 4.4–5.9)
WBC # BLD AUTO: 6.5 10E3/UL (ref 4–11)

## 2024-09-06 PROCEDURE — 83036 HEMOGLOBIN GLYCOSYLATED A1C: CPT | Performed by: INTERNAL MEDICINE

## 2024-09-06 PROCEDURE — 80053 COMPREHEN METABOLIC PANEL: CPT | Performed by: INTERNAL MEDICINE

## 2024-09-06 PROCEDURE — 82570 ASSAY OF URINE CREATININE: CPT | Performed by: INTERNAL MEDICINE

## 2024-09-06 PROCEDURE — G0439 PPPS, SUBSEQ VISIT: HCPCS | Performed by: INTERNAL MEDICINE

## 2024-09-06 PROCEDURE — 36415 COLL VENOUS BLD VENIPUNCTURE: CPT | Performed by: INTERNAL MEDICINE

## 2024-09-06 PROCEDURE — 85027 COMPLETE CBC AUTOMATED: CPT | Performed by: INTERNAL MEDICINE

## 2024-09-06 PROCEDURE — 99213 OFFICE O/P EST LOW 20 MIN: CPT | Mod: 25 | Performed by: INTERNAL MEDICINE

## 2024-09-06 PROCEDURE — 80061 LIPID PANEL: CPT | Performed by: INTERNAL MEDICINE

## 2024-09-06 PROCEDURE — G0103 PSA SCREENING: HCPCS | Performed by: INTERNAL MEDICINE

## 2024-09-06 PROCEDURE — 82043 UR ALBUMIN QUANTITATIVE: CPT | Performed by: INTERNAL MEDICINE

## 2024-09-06 NOTE — PROGRESS NOTES
Preventive Care Visit  [facility]  Cody Pacheco MD, Internal Medicine  Sep 6, 2024      1. Annual physical exam  This is a 74-year-old man with issues as discussed below    2. Screening for prostate cancer  - Prostate Specific Antigen Screen; Future    3. Type 2 diabetes mellitus with both eyes affected by mild nonproliferative retinopathy without macular edema, without long-term current use of insulin (H)  Continue current plan, discussed Jardiance or GLP-1 agonist and he defers for now.  Discussed importance of excellent diabetic footcare and trimming his nails as well as annual diabetic eye exam  - Comprehensive metabolic panel; Future  - Hemoglobin A1c; Future  - Albumin Random Urine Quantitative with Creat Ratio; Future    4. Diabetic polyneuropathy associated with type 2 diabetes mellitus (H)  Stable    5. Essential hypertension  Blood pressure okay continue same  - CBC with platelets; Future    6. Mixed hyperlipidemia  Continue statin  - Lipid panel reflex to direct LDL Fasting; Future    7. Stage 3a chronic kidney disease (H)  As above, continue ACE inhibitor consider SGLT2 inhibitor    8. Class 2 severe obesity due to excess calories with serious comorbidity and body mass index (BMI) of 35.0 to 35.9 in adult (H)  Continue to focus on reduction in calories, carbohydrates regular exercise and modest weight loss    Veronica Mclain is a 74 year old, presenting for the following:  Annual Visit        9/6/2024    10:57 AM   Additional Questions   Roomed by Anatoliy MCGRAW RN   Accompanied by Suzy (Sister in law)         Health Care Directive  Patient does not have a Health Care Directive or Living Will: Discussed advance care planning with patient; information given to patient to review. HUMBERTO Alonso would be his MDM    HPI            9/6/2024   General Health   How would you rate your overall physical health? Good   Feel stress (tense, anxious, or unable to sleep) Only a little      (!)  STRESS CONCERN      9/6/2024   Nutrition   Diet: Low salt    Diabetic    Breakfast skipped       Multiple values from one day are sorted in reverse-chronological order         9/6/2024   Exercise   Days per week of moderate/strenous exercise 1 day      (!) EXERCISE CONCERN      9/6/2024   Social Factors   Frequency of gathering with friends or relatives Once a week   Worry food won't last until get money to buy more No   Food not last or not have enough money for food? No   Do you have housing? (Housing is defined as stable permanent housing and does not include staying ouside in a car, in a tent, in an abandoned building, in an overnight shelter, or couch-surfing.) Yes   Are you worried about losing your housing? No   Lack of transportation? No   Unable to get utilities (heat,electricity)? No            9/6/2024   Fall Risk   Fallen 2 or more times in the past year? No    No   Trouble with walking or balance? No    No       Multiple values from one day are sorted in reverse-chronological order          9/6/2024   Activities of Daily Living- Home Safety   Needs help with the following daily activites Transportation    Shopping    Housework    Medication administration    Money management   Safety concerns in the home No handrails on the stairs       Multiple values from one day are sorted in reverse-chronological order         9/6/2024   Dental   Dentist two times every year? Yes            9/6/2024   Hearing Screening   Hearing concerns? None of the above            9/6/2024   Driving Risk Screening   Patient/family members have concerns about driving (!) DECLINE            9/6/2024   General Alertness/Fatigue Screening   Have you been more tired than usual lately? No            9/6/2024   Urinary Incontinence Screening   Bothered by leaking urine in past 6 months No            9/6/2024   TB Screening   Were you born outside of the [place]? No            Today's PHQ-2 Score:       9/6/2024    10:52 AM   PHQ-2 ( 1999  [employer])   Q1: Little interest or pleasure in doing things 0   Q2: Feeling down, depressed or hopeless 0   PHQ-2 Score 0   Q1: Little interest or pleasure in doing things Not at all   Q2: Feeling down, depressed or hopeless Not at all   PHQ-2 Score 0           9/6/2024   Substance Use   Alcohol more than 3/day or more than 7/wk Not Applicable   Do you have a current opioid prescription? No   How severe/bad is pain from 1 to 10? 0/10 (No Pain)   Do you use any other substances recreationally? No        Social History     Tobacco Use    Smoking status: Never    Smokeless tobacco: Never   Vaping Use    Vaping status: Never Used   Substance Use Topics    Alcohol use: Not Currently    Drug use: No           9/6/2024   AAA Screening   Family history of Abdominal Aortic Aneurysm (AAA)? No      ASCVD Risk   The ASCVD Risk score (Radha MITCHELL, et al., 2019) failed to calculate for the following reasons:    The patient has a prior MI or stroke diagnosis          Reviewed and updated as needed this visit by Provider      Problems    Fam Hx            Current providers sharing in care for this patient include:  Patient Care Team:  Cody Pacheco MD as PCP - General  Cody Pacheco MD as Assigned PCP  Cody Mahmood MD as MD (Neurology)  Cody Mahmood MD as Assigned Neuroscience Provider  Coco Pacheco, PhD LP as Assigned Behavioral Health Provider    The following health maintenance items are reviewed in Epic and correct as of today:  Health Maintenance   Topic Date Due    ZOSTER IMMUNIZATION (1 of 2) Never done    RSV VACCINE (1 - 1-dose 60+ series) Never done    DTAP/TDAP/TD IMMUNIZATION (2 - Td or Tdap) 01/16/2018    EYE EXAM  06/20/2024    LIPID  07/21/2024    INFLUENZA VACCINE (1) 09/01/2024    COVID-19 Vaccine (8 - 2023-24 season) 09/01/2024    A1C  09/04/2024    HEMOGLOBIN  07/21/2024    BMP  03/04/2025    MEDICARE ANNUAL WELLNESS VISIT  09/06/2025    DIABETIC FOOT EXAM  09/06/2025     "ANNUAL REVIEW OF HM ORDERS  09/06/2025    FALL RISK ASSESSMENT  09/06/2025    ADVANCE CARE PLANNING  07/21/2028    COLORECTAL CANCER SCREENING  06/11/2029    PHQ-2 (once per calendar year)  Completed    Pneumococcal Vaccine: 65+ Years  Completed    URINALYSIS  Completed    HPV IMMUNIZATION  Aged Out    MENINGITIS IMMUNIZATION  Aged Out    RSV MONOCLONAL ANTIBODY  Aged Out    MICROALBUMIN  Discontinued    HEPATITIS C SCREENING  Discontinued          Objective    Exam  /78 (BP Location: Left arm, Patient Position: Sitting, Cuff Size: Adult Regular)   Pulse 70   Temp 97.3  F (36.3  C) (Tympanic)   Resp 11   Ht 1.689 m (5' 6.5\")   Wt 100.8 kg (222 lb 4.8 oz)   SpO2 97%   BMI 35.34 kg/m     Estimated body mass index is 35.34 kg/m  as calculated from the following:    Height as of this encounter: 1.689 m (5' 6.5\").    Weight as of this encounter: 100.8 kg (222 lb 4.8 oz).    Physical Exam  EYES: Eyelids, conjunctiva, and sclera were normal. Pupils were normal. Cornea, iris, and lens were normal bilaterally.  HEAD, EARS, NOSE, MOUTH, AND THROAT: Head and face were normal. Hearing was normal to voice and the ears were normal to external exam. Nose appearance was normal and there was no discharge.  NECK: Neck appearance was normal.   RESPIRATORY: Breathing pattern was normal and the chest moved symmetrically.  Lung sounds were normal and there were no abnormal sounds.  CARDIOVASCULAR: Heart rate and rhythm were normal.  S1 and S2 were normal and there were no extra sounds or murmurs. Peripheral pulses in arms and legs were normal.  There was mild ankle edema  GASTROINTESTINAL: The abdomen was normal in contour.  SKIN/HAIR/NAILS: needs to cut toenails  NEUROLOGIC: The patient was alert and oriented to person, place, time, and circumstance. Speech was normal. Cranial nerves were normal. Motor strength was normal for age. The patient was normally coordinated.  PSYCHIATRIC:  Mood and affect were normal and the " patient had normal recent and remote memory. The patient's judgment and insight were normal.      9/6/2024   Mini Cog   Clock Draw Score 2 Normal   3 Item Recall 2 objects recalled   Mini Cog Total Score 4                 Signed Electronically by: Cody Pacheco MD

## 2024-09-06 NOTE — PATIENT INSTRUCTIONS
Patient Education   Preventive Care Advice   This is general advice given by our system to help you stay healthy. However, your care team may have specific advice just for you. Please talk to your care team about your preventive care needs.  Nutrition  Eat 5 or more servings of fruits and vegetables each day.  Try wheat bread, brown rice and whole grain pasta (instead of white bread, rice, and pasta).  Get enough calcium and vitamin D. Check the label on foods and aim for 100% of the RDA (recommended daily allowance).  Lifestyle  Exercise at least 150 minutes each week  (30 minutes a day, 5 days a week).  Do muscle strengthening activities 2 days a week. These help control your weight and prevent disease.  No smoking.  Wear sunscreen to prevent skin cancer.  Have a dental exam and cleaning every 6 months.  Yearly exams  See your health care team every year to talk about:  Any changes in your health.  Any medicines your care team has prescribed.  Preventive care, family planning, and ways to prevent chronic diseases.  Shots (vaccines)   HPV shots (up to age 26), if you've never had them before.  Hepatitis B shots (up to age 59), if you've never had them before.  COVID-19 shot: Get this shot when it's due.  Flu shot: Get a flu shot every year.  Tetanus shot: Get a tetanus shot every 10 years.  Pneumococcal, hepatitis A, and RSV shots: Ask your care team if you need these based on your risk.  Shingles shot (for age 50 and up)  General health tests  Diabetes screening:  Starting at age 35, Get screened for diabetes at least every 3 years.  If you are younger than age 35, ask your care team if you should be screened for diabetes.  Cholesterol test: At age 39, start having a cholesterol test every 5 years, or more often if advised.  Bone density scan (DEXA): At age 50, ask your care team if you should have this scan for osteoporosis (brittle bones).  Hepatitis C: Get tested at least once in your life.  STIs (sexually  transmitted infections)  Before age 24: Ask your care team if you should be screened for STIs.  After age 24: Get screened for STIs if you're at risk. You are at risk for STIs (including HIV) if:  You are sexually active with more than one person.  You don't use condoms every time.  You or a partner was diagnosed with a sexually transmitted infection.  If you are at risk for HIV, ask about PrEP medicine to prevent HIV.  Get tested for HIV at least once in your life, whether you are at risk for HIV or not.  Cancer screening tests  Cervical cancer screening: If you have a cervix, begin getting regular cervical cancer screening tests starting at age 21.  Breast cancer scan (mammogram): If you've ever had breasts, begin having regular mammograms starting at age 40. This is a scan to check for breast cancer.  Colon cancer screening: It is important to start screening for colon cancer at age 45.  Have a colonoscopy test every 10 years (or more often if you're at risk) Or, ask your provider about stool tests like a FIT test every year or Cologuard test every 3 years.  To learn more about your testing options, visit:   .  For help making a decision, visit:   https://bit.ly/yu03224.  Prostate cancer screening test: If you have a prostate, ask your care team if a prostate cancer screening test (PSA) at age 55 is right for you.  Lung cancer screening: If you are a current or former smoker ages 50 to 80, ask your care team if ongoing lung cancer screenings are right for you.  For informational purposes only. Not to replace the advice of your health care provider. Copyright   2023 [facility]. All rights reserved. Clinically reviewed by the [facility] Program. [facility] 126176 - REV 01/24.  Learning About Activities of Daily Living  What are activities of daily living?     Activities of daily living (ADLs) are the basic self-care tasks you do every day. These include eating, bathing, dressing,  and moving around.  As you age, and if you have health problems, you may find that it's harder to do some of these tasks. If so, your doctor can suggest ideas that may help.  To measure what kind of help you may need, your doctor will ask how well you are able to do ADLs. Let your doctor know if there are any tasks that you are having trouble doing. This is an important first step to getting help. And when you have the help you need, you can stay as independent as possible.  How will a doctor assess your ADLs?  Asking about ADLs is part of a routine health checkup your doctor will likely do as you age. Your health check might be done in a doctor's office, in your home, or at a hospital. The goal is to find out if you are having any problems that could make it hard to care for yourself or that make it unsafe for you to be on your own.  To measure your ADLs, your doctor will ask how hard it is for you to do routine tasks. Your doctor may also want to know if you have changed the way you do a task because of a health problem. Your doctor may watch how you:  Walk back and forth.  Keep your balance while you stand or walk.  Move from sitting to standing or from a bed to a chair.  Button or unbutton a shirt or sweater.  Remove and put on your shoes.  It's common to feel a little worried or anxious if you find you can't do all the things you used to be able to do. Talking with your doctor about ADLs is a way to make sure you're as safe as possible and able to care for yourself as well as you can. You may want to bring a caregiver, friend, or family member to your checkup. They can help you talk to your doctor.  Follow-up care is a key part of your treatment and safety. Be sure to make and go to all appointments, and call your doctor if you are having problems. It's also a good idea to know your test results and keep a list of the medicines you take.  Current as of: October 24, 2023  Content Version: 14.1    5139-1035  Healthwise, [employer].   Care instructions adapted under license by your healthcare professional. If you have questions about a medical condition or this instruction, always ask your healthcare professional. Healthwise, [employer] disclaims any warranty or liability for your use of this information.

## 2024-09-07 LAB
ALBUMIN SERPL BCG-MCNC: 4.4 G/DL (ref 3.5–5.2)
ALP SERPL-CCNC: 42 U/L (ref 40–150)
ALT SERPL W P-5'-P-CCNC: 28 U/L (ref 0–70)
ANION GAP SERPL CALCULATED.3IONS-SCNC: 13 MMOL/L (ref 7–15)
AST SERPL W P-5'-P-CCNC: 20 U/L (ref 0–45)
BILIRUB SERPL-MCNC: 0.4 MG/DL
BUN SERPL-MCNC: 18.3 MG/DL (ref 8–23)
CALCIUM SERPL-MCNC: 9.3 MG/DL (ref 8.8–10.4)
CHLORIDE SERPL-SCNC: 104 MMOL/L (ref 98–107)
CHOLEST SERPL-MCNC: 127 MG/DL
CREAT SERPL-MCNC: 1.02 MG/DL (ref 0.67–1.17)
CREAT UR-MCNC: 152 MG/DL
EGFRCR SERPLBLD CKD-EPI 2021: 77 ML/MIN/1.73M2
FASTING STATUS PATIENT QL REPORTED: ABNORMAL
FASTING STATUS PATIENT QL REPORTED: NORMAL
GLUCOSE SERPL-MCNC: 122 MG/DL (ref 70–99)
HCO3 SERPL-SCNC: 23 MMOL/L (ref 22–29)
HDLC SERPL-MCNC: 40 MG/DL
LDLC SERPL CALC-MCNC: 66 MG/DL
MICROALBUMIN UR-MCNC: 91.2 MG/L
MICROALBUMIN/CREAT UR: 60 MG/G CR (ref 0–17)
NONHDLC SERPL-MCNC: 87 MG/DL
POTASSIUM SERPL-SCNC: 4.5 MMOL/L (ref 3.4–5.3)
PROT SERPL-MCNC: 7.3 G/DL (ref 6.4–8.3)
PSA SERPL DL<=0.01 NG/ML-MCNC: 1.38 NG/ML (ref 0–6.5)
SODIUM SERPL-SCNC: 140 MMOL/L (ref 135–145)
TRIGL SERPL-MCNC: 106 MG/DL

## 2024-09-16 DIAGNOSIS — E78.2 MIXED HYPERLIPIDEMIA: ICD-10-CM

## 2024-09-16 DIAGNOSIS — I10 ESSENTIAL HYPERTENSION: ICD-10-CM

## 2024-09-16 DIAGNOSIS — E11.3293 TYPE 2 DIABETES MELLITUS WITH BOTH EYES AFFECTED BY MILD NONPROLIFERATIVE RETINOPATHY WITHOUT MACULAR EDEMA, WITHOUT LONG-TERM CURRENT USE OF INSULIN (H): Primary | ICD-10-CM

## 2024-09-16 RX ORDER — ATORVASTATIN CALCIUM 20 MG/1
20 TABLET, FILM COATED ORAL DAILY
Qty: 90 TABLET | Refills: 2 | Status: SHIPPED | OUTPATIENT
Start: 2024-09-16

## 2024-09-16 RX ORDER — AMLODIPINE BESYLATE 10 MG/1
10 TABLET ORAL DAILY
Qty: 90 TABLET | Refills: 2 | Status: SHIPPED | OUTPATIENT
Start: 2024-09-16

## 2024-09-30 DIAGNOSIS — I10 ESSENTIAL HYPERTENSION: ICD-10-CM

## 2024-09-30 RX ORDER — LISINOPRIL 20 MG/1
TABLET ORAL
Qty: 90 TABLET | Refills: 2 | Status: SHIPPED | OUTPATIENT
Start: 2024-09-30

## 2024-10-21 DIAGNOSIS — N40.1 BENIGN PROSTATIC HYPERPLASIA WITH URINARY FREQUENCY: ICD-10-CM

## 2024-10-21 DIAGNOSIS — R35.0 BENIGN PROSTATIC HYPERPLASIA WITH URINARY FREQUENCY: ICD-10-CM

## 2024-10-22 RX ORDER — FINASTERIDE 5 MG/1
1 TABLET, FILM COATED ORAL DAILY
Qty: 90 TABLET | Refills: 3 | Status: SHIPPED | OUTPATIENT
Start: 2024-10-22

## 2024-10-22 RX ORDER — TAMSULOSIN HYDROCHLORIDE 0.4 MG/1
0.4 CAPSULE ORAL DAILY
Qty: 90 CAPSULE | Refills: 3 | Status: SHIPPED | OUTPATIENT
Start: 2024-10-22

## 2025-05-08 ENCOUNTER — TRANSFERRED RECORDS (OUTPATIENT)
Dept: HEALTH INFORMATION MANAGEMENT | Facility: CLINIC | Age: 75
End: 2025-05-08
Payer: MEDICARE

## 2025-05-08 LAB — RETINOPATHY: NEGATIVE

## 2025-06-29 DIAGNOSIS — I10 ESSENTIAL HYPERTENSION: ICD-10-CM

## 2025-06-30 RX ORDER — LISINOPRIL 20 MG/1
TABLET ORAL
Qty: 90 TABLET | Refills: 3 | Status: SHIPPED | OUTPATIENT
Start: 2025-06-30

## 2025-07-18 ENCOUNTER — OFFICE VISIT (OUTPATIENT)
Dept: PODIATRY | Facility: CLINIC | Age: 75
End: 2025-07-18
Payer: MEDICARE

## 2025-07-18 VITALS — WEIGHT: 228.5 LBS | HEIGHT: 67 IN | BODY MASS INDEX: 35.87 KG/M2

## 2025-07-18 DIAGNOSIS — B35.1 ONYCHOMYCOSIS: ICD-10-CM

## 2025-07-18 DIAGNOSIS — E11.42 DIABETIC POLYNEUROPATHY ASSOCIATED WITH TYPE 2 DIABETES MELLITUS (H): Primary | ICD-10-CM

## 2025-07-18 NOTE — PROGRESS NOTES
CC: Diabetic foot check     HPI: Chemo Casillas is a 75 year old male who presents to clinic for diabetic foot check.  Patient reports that their PCP informed the patient that they should be seen by podiatrist for diabetic foot evaluation.  Patient states he is having problems with his elongated thickened nails, would like these evaluated today.    Past Surgical History:   Procedure Laterality Date    NO PAST SURGERIES       No past medical history on file.  Medications:  Current Outpatient Medications   Medication Sig Dispense Refill    amLODIPine (NORVASC) 10 MG tablet TAKE 1 TABLET DAILY 90 tablet 3    aspirin 81 MG EC tablet Take 1 tablet (81 mg) by mouth daily 90 tablet 4    atorvastatin (LIPITOR) 20 MG tablet TAKE 1 TABLET DAILY 90 tablet 1    finasteride (PROSCAR) 5 MG tablet TAKE 1 TABLET DAILY 90 tablet 3    lisinopril (ZESTRIL) 20 MG tablet TAKE 1 TABLET DAILY (THIS REPLACES LISINOPRIL-HYDROCHLOROTHIAZIDE) 90 tablet 3    metFORMIN (GLUCOPHAGE) 1000 MG tablet TAKE 1 TABLET TWICE A DAY WITH MEALS 180 tablet 3    tamsulosin (FLOMAX) 0.4 MG capsule TAKE 1 CAPSULE DAILY 90 capsule 3     Allergies:  Patient has no known allergies.  Social History     Socioeconomic History    Marital status: Single     Spouse name: Not on file    Number of children: Not on file    Years of education: Not on file    Highest education level: Not on file   Occupational History    Not on file   Tobacco Use    Smoking status: Never    Smokeless tobacco: Never   Vaping Use    Vaping status: Never Used   Substance and Sexual Activity    Alcohol use: Not Currently    Drug use: No    Sexual activity: Not on file   Other Topics Concern    Not on file   Social History Narrative    Lives alone.  Retired from [employer].      Social Drivers of Health     Financial Resource Strain: Low Risk  (9/6/2024)    Financial Resource Strain     Within the past 12 months, have you or your family members you live with been unable to get utilities  (heat, electricity) when it was really needed?: No   Food Insecurity: Low Risk  (9/6/2024)    Food Insecurity     Within the past 12 months, did you worry that your food would run out before you got money to buy more?: No     Within the past 12 months, did the food you bought just not last and you didn t have money to get more?: No   Transportation Needs: Low Risk  (9/6/2024)    Transportation Needs     Within the past 12 months, has lack of transportation kept you from medical appointments, getting your medicines, non-medical meetings or appointments, work, or from getting things that you need?: No   Physical Activity: Unknown (9/6/2024)    Exercise Vital Sign     Days of Exercise per Week: 1 day     Minutes of Exercise per Session: Not on file   Stress: No Stress Concern Present (9/6/2024)    [employer] - Occupational Stress Questionnaire     Feeling of Stress : Only a little   Social Connections: Unknown (9/6/2024)    Social Connection and Isolation Panel [NHANES]     Frequency of Communication with Friends and Family: Not on file     Frequency of Social Gatherings with Friends and Family: Once a week     Attends Religion Services: Not on file     Active Member of Clubs or Organizations: Not on file     Attends Club or Organization Meetings: Not on file     Marital Status: Not on file   Interpersonal Safety: Low Risk  (5/31/2024)    Interpersonal Safety     Do you feel physically and emotionally safe where you currently live?: Yes     Within the past 12 months, have you been hit, slapped, kicked or otherwise physically hurt by someone?: No     Within the past 12 months, have you been humiliated or emotionally abused in other ways by your partner or ex-partner?: No   Housing Stability: Low Risk  (9/6/2024)    Housing Stability     Do you have housing? : Yes     Are you worried about losing your housing?: No     Family History   Problem Relation Age of Onset    Aortic Valve  Replacement Mother              Melanoma Father              Mitral Valve Replacement Brother         , staph endocarditis       Medical records were reviewed and are summarized above.    Review of Systems    PHYSICAL EXAM:  Focused lower extremity physical exam:     Derm: Skin is cool, thin, dry, intact. No open wounds, laceration or abrasions noted. Nails are elongated, thickened, dystrophic with subungual debris x 10. No ecchymosis or erythema noted.     Vasc: Dorsalis pedis pulses, 1/4 bilateral. Posterior tibial pulses 1/4 bilateral. Cap fill time < 3 seconds to the digits.  Mild nonpitting edema is present to the bilateral lower extremity. Hair growth absent to the toes.  Multiple varicosities appreciated to the hindfoot and ankle bilaterally.    Neuro: Protective sensation diminished via SWMF examination to the feet. Gross sensation intact.     MSK:   - Tenderness palpation to the elongated digits x 10.  - Muscle strength 5/5 with dorsiflexion, plantarflexion, eversion, inversion of the feet. Compartments soft and compressible.    Labs: A1c: 6.9% 4 months ago    Assessment:  - Type 2 diabetes with peripheral neuropathy  - Onychomycosis x 10, painful    Plan:  - Patient was seen and evaluated in clinic by myself.   - Type 2 diabetes with peripheral neuropathy:  Discussed the patient's type 2 diabetes.  Discussed the patient's peripheral neuropathy.  Discussed hyperglycemia, A1c, and its effect on the peripheral nerves especially the nerves to the eyes and toes.  Discussed pedal foot checks and what to look for.  Discussed shear force first ground reactive forces.  Discussed that this is how ulcerations typically begin to start.  Discussed the importance with diabetic shoes and inserts.  Discussed the importance of an accommodative multilayered insert for this.  Diabetic shoes and inserts were ordered for the patient today.    - Painful onychomycosis:  Discussed the patient's  onychomycosis.  Discussed the nail dystrophy.  Discussed the subungual debris.  Discussed the difference between traumatic dystrophy of the nail versus onychomycosis of the nail.  Discussed topical versus oral antifungals.  Discussed antifungal treatment of the nail versus trimming and debridement of the nail.    - Procedure - nail debridement 6 or greater:  After verbal and written consent were obtained, all nails, x 10, were manually debrided without complication.  Patient noted immediate relief.  Patient tolerated procedure well.    -Patient to return to clinic in 3 months or sooner should problems arise.    Migue Abreu DPM

## 2025-07-18 NOTE — PATIENT INSTRUCTIONS
What are Prescription Custom Orthotics?  Custom orthotics are specially-made devices designed to support and comfort your feet. Prescription orthotics are crafted for you and no one else. They match the contours of your feet precisely and are designed for the way you move. Orthotics are only manufactured after a podiatrist has conducted a complete evaluation of your feet, ankles, and legs, so the orthotic can accommodate your unique foot structure and pathology.  Prescription orthotics are divided into two categories:  Functional orthotics are designed to control abnormal motion. They may be used to treat foot pain caused by abnormal motion; they can also be used to treat injuries such as shin splints or tendinitis. Functional orthotics are usually crafted of a semi-rigid material such as plastic or graphite.  Accommodative orthotics are softer and meant to provide additional cushioning and support. They can be used to treat diabetic foot ulcers, painful calluses on the bottom of the foot, and other uncomfortable conditions.  Podiatrists use orthotics to treat foot problems such as plantar fasciitis, bursitis, tendinitis, diabetic foot ulcers, and foot, ankle, and heel pain. Clinical research studies have shown that podiatrist-prescribed foot orthotics decrease foot pain and improve function.  Orthotics typically cost more than shoe inserts purchased in a retail store, but the additional cost is usually well worth it. Unlike shoe inserts, orthotics are molded to fit each individual foot, so you can be sure that your orthotics fit and do what they're supposed to do. Prescription orthotics are also made of top-notch materials and last many years when cared for properly. Insurance often helps pay for prescription orthotics.  What are Shoe Inserts?   You've seen them at the grocery store and at the mall. You've probably even seen them on TV and online. Shoe inserts are any kind of non-prescription foot support designed  to be worn inside a shoe. Pre-packaged, mass produced, arch supports are shoe inserts. So are the  custom-made  insoles and foot supports that you can order online or at retail stores. Unless the device has been prescribed by a doctor and crafted for your specific foot, it's a shoe insert, not a custom orthotic device--despite what the ads might say.  Shoe inserts can be very helpful for a variety of foot ailments, including flat arches and foot and leg pain. They can cushion your feet, provide comfort, and support your arches, but they can't correct biomechanical foot problems or cure long-standing foot issues.  The most common types of shoe inserts are:  Arch supports: Some people have high arches. Others have low arches or flat feet. Arch supports generally have a  bumped-up  appearance and are designed to support the foot's natural arch.   Insoles: Insoles slip into your shoe to provide extra cushioning and support. Insoles are often made of gel, foam, or plastic.   Heel liners: Heel liners, sometimes called heel pads or heel cups, provide extra cushioning in the heel region. They may be especially useful for patients who have foot pain caused by age-related thinning of the heels' natural fat pads.   Foot cushions: Do your shoes rub against your heel or your toes? Foot cushions come in many different shapes and sizes and can be used as a barrier between you and your shoe.  Choosing an Over-the-Counter Shoe Insert  Selecting a shoe insert from the wide variety of devices on the market can be overwhelming. Here are some podiatrist-tested tips to help you find the insert that best meets your needs:  Consider your health. Do you have diabetes? Problems with circulation? An over-the-counter insert may not be your best bet. Diabetes and poor circulation increase your risk of foot ulcers and infections, so schedule an appointment with a podiatrist. He or she can help you select a solution that won't cause additional  health problems.   Think about the purpose. Are you planning to run a marathon, or do you just need a little arch support in your work shoes? Look for a product that fits your planned level of activity.   Bring your shoes. For the insert to be effective, it has to fit into your shoes. So bring your sneakers, dress shoes, or work boots--whatever you plan to wear with your insert. Look for an insert that will fit the contours of your shoe.   Try them on. If all possible, slip the insert into your shoe and try it out. Walk around a little. How does it feel? Don't assume that feelings of pressure will go away with continued wear. (If you can't try the inserts at the store, ask about the store's return policy and hold on to your receipt.)    Please call one of the [place] locations below to schedule an appointment. If you received a prescription please bring it with you to your appointment. Some locations are limited to what they carry.    Office Locations    [facility]  [address] [place] [address]  Home Medical Equipment, [address]   Phone: [phone]   Orthotics and Prosthetics, [address]   Phone: [phone]    [facility]   Home Medical Equipment   [address] [address]  Phone :[phone]  Orthotics and Prosthetics   [address], [address], [facility] [address]  Phone:[phone]          [facility] [place]  [address]  [address]   [place] [address]   Phone: [phone]    [facility]  [address]  [place] [address]  Phone: [phone]    [facility]  [address]  [place] [address]  Phone: [phone]  Fax: [phone]    [facility]   [address]  [place] [address]  Phone:  [phone]    [facility]  [address]  [place] [address]  Phone: [phone]    [facility]  [address]  [place] [address]  Phone: [phone]    [place]   [address] [place] [address]   Phone: [phone]    WEARING YOUR CUSTOM FOOT ORTHOTICS   Most insurance plans cover one pair of orthotics per year. You must check with your   insurance plan to see what your payment responsibility will be. Please call your   insurance company by calling the number on the back of your insurance card.   Orthotic's are non-refundable and non-returnable.   Orthotics are made of various designs. Some orthotics are covered with material that extends beyond your toes. If your orthotic is of this design, you will likely need to trim the toe end to get a proper fit. The insole from your shoe can be used as a template. Simply overlay the shoe insert on top of the custom orthotic. Align the heel end while tracing the length of the insert onto the custom orthotic. Use a large scissor to trim the toe end until you get a proper fit in the shoe.   The orthotic needs to be pushed as far back in the shoe as possible. The heel portion should not ride forward so as not to irritate your heel.   Orthotics are designed to work with socks. Excessive perspiration will shorten the life span of the orthotics. Remove the orthotic from the shoe frequently for proper drying.   The break-in period lasts for weeks. People new to orthotics will likely experience new aches and pains. The orthotic is forcing your foot into a new position. Arch, foot and leg muscle aches and fatigue are common during these weeks. Minor discomfort can be considered normal break in phenomenon. Start wearing your orthotic around your home your first day. Limited activity for one to two hours is recommended. You can increase one or two additional hours each  day provided the aches and pains are subsiding. The degree of discomfort, fatigue and problems will dictate the speed of break in. You may require multiple weeks to work up to full time use.   Do not continue wearing your orthotics if they are creating problems such as blisters or sores. Do not hesitate to call the clinic to speak with a nurse regarding orthotic   break in, fit, trimming, etc. You may also need to see the doctor if the orthotics are   simply not working out. Adjustments are sometimes made to improve orthotic   function.     Orthotics will only work in certain styles and types of shoes. Orthotics rarely work in dress shoes. Slip-ons, clogs, sandals and heels are particularly troublesome. Specially designed orthotics may be necessary for these types of shoes. Your custom orthotic was designed for activities that require appropriate walking or running shoes. Lace up athletic shoes, walking shoes or work boots should work appropriately. You may need a wider or longer shoe. Shoes with a removable  or insert work best. In general, you want to remove an insert from the shoe before placing the orthotic into the shoe. Shoes without a removable liner may not work as well.     When purchasing new shoes, bring your orthotics along to get a proper fit. Shop at stores that are familiar with orthotics.   Frequent washing of the orthotic may shorten the life span of the top cover. The top cover can be replaced but will generally last one to five years depending on use and foot perspiration.